# Patient Record
Sex: FEMALE | Race: WHITE | NOT HISPANIC OR LATINO | Employment: OTHER | URBAN - METROPOLITAN AREA
[De-identification: names, ages, dates, MRNs, and addresses within clinical notes are randomized per-mention and may not be internally consistent; named-entity substitution may affect disease eponyms.]

---

## 2017-02-24 ENCOUNTER — COMMUNICATION - HEALTHEAST (OUTPATIENT)
Dept: INTERNAL MEDICINE | Facility: CLINIC | Age: 67
End: 2017-02-24

## 2017-03-09 ENCOUNTER — COMMUNICATION - HEALTHEAST (OUTPATIENT)
Dept: INTERNAL MEDICINE | Facility: CLINIC | Age: 67
End: 2017-03-09

## 2017-03-21 ENCOUNTER — OFFICE VISIT - HEALTHEAST (OUTPATIENT)
Dept: INTERNAL MEDICINE | Facility: CLINIC | Age: 67
End: 2017-03-21

## 2017-03-21 ENCOUNTER — COMMUNICATION - HEALTHEAST (OUTPATIENT)
Dept: INTERNAL MEDICINE | Facility: CLINIC | Age: 67
End: 2017-03-21

## 2017-03-21 DIAGNOSIS — Z00.00 ROUTINE GENERAL MEDICAL EXAMINATION AT A HEALTH CARE FACILITY: ICD-10-CM

## 2017-03-21 DIAGNOSIS — R10.13 DYSPEPSIA: ICD-10-CM

## 2017-03-21 LAB
CHOLEST SERPL-MCNC: 180 MG/DL
FASTING STATUS PATIENT QL REPORTED: YES
HDLC SERPL-MCNC: 57 MG/DL
LDLC SERPL CALC-MCNC: 107 MG/DL
TRIGL SERPL-MCNC: 81 MG/DL

## 2017-03-21 ASSESSMENT — MIFFLIN-ST. JEOR: SCORE: 1450.29

## 2017-05-20 ENCOUNTER — COMMUNICATION - HEALTHEAST (OUTPATIENT)
Dept: INTERNAL MEDICINE | Facility: CLINIC | Age: 67
End: 2017-05-20

## 2017-05-31 ENCOUNTER — RECORDS - HEALTHEAST (OUTPATIENT)
Dept: ADMINISTRATIVE | Facility: OTHER | Age: 67
End: 2017-05-31

## 2017-06-05 ENCOUNTER — COMMUNICATION - HEALTHEAST (OUTPATIENT)
Dept: INTERNAL MEDICINE | Facility: CLINIC | Age: 67
End: 2017-06-05

## 2017-09-01 ENCOUNTER — HOSPITAL ENCOUNTER (OUTPATIENT)
Dept: MAMMOGRAPHY | Facility: CLINIC | Age: 67
Discharge: HOME | End: 2017-09-01
Attending: OBSTETRICS & GYNECOLOGY

## 2017-09-01 DIAGNOSIS — Z12.31 VISIT FOR SCREENING MAMMOGRAM: ICD-10-CM

## 2018-01-02 ENCOUNTER — OFFICE VISIT - HEALTHEAST (OUTPATIENT)
Dept: INTERNAL MEDICINE | Facility: CLINIC | Age: 68
End: 2018-01-02

## 2018-01-02 DIAGNOSIS — R07.89 CHEST PRESSURE: ICD-10-CM

## 2018-01-02 DIAGNOSIS — R10.13 DYSPEPSIA: ICD-10-CM

## 2018-01-02 LAB
ATRIAL RATE - MUSE: 66 BPM
DIASTOLIC BLOOD PRESSURE - MUSE: NORMAL MMHG
INTERPRETATION ECG - MUSE: NORMAL
P AXIS - MUSE: 57 DEGREES
PR INTERVAL - MUSE: 144 MS
QRS DURATION - MUSE: 78 MS
QT - MUSE: 380 MS
QTC - MUSE: 398 MS
R AXIS - MUSE: 7 DEGREES
SYSTOLIC BLOOD PRESSURE - MUSE: NORMAL MMHG
T AXIS - MUSE: 21 DEGREES
VENTRICULAR RATE- MUSE: 66 BPM

## 2018-01-02 ASSESSMENT — MIFFLIN-ST. JEOR: SCORE: 1468.43

## 2018-01-17 ENCOUNTER — COMMUNICATION - HEALTHEAST (OUTPATIENT)
Dept: INTERNAL MEDICINE | Facility: CLINIC | Age: 68
End: 2018-01-17

## 2018-03-28 ENCOUNTER — COMMUNICATION - HEALTHEAST (OUTPATIENT)
Dept: INTERNAL MEDICINE | Facility: CLINIC | Age: 68
End: 2018-03-28

## 2018-04-26 ENCOUNTER — COMMUNICATION - HEALTHEAST (OUTPATIENT)
Dept: INTERNAL MEDICINE | Facility: CLINIC | Age: 68
End: 2018-04-26

## 2018-05-02 ENCOUNTER — RECORDS - HEALTHEAST (OUTPATIENT)
Dept: ADMINISTRATIVE | Facility: OTHER | Age: 68
End: 2018-05-02

## 2018-05-29 ENCOUNTER — COMMUNICATION - HEALTHEAST (OUTPATIENT)
Dept: INTERNAL MEDICINE | Facility: CLINIC | Age: 68
End: 2018-05-29

## 2018-06-01 ENCOUNTER — RECORDS - HEALTHEAST (OUTPATIENT)
Dept: ADMINISTRATIVE | Facility: OTHER | Age: 68
End: 2018-06-01

## 2018-06-19 ENCOUNTER — COMMUNICATION - HEALTHEAST (OUTPATIENT)
Dept: INTERNAL MEDICINE | Facility: CLINIC | Age: 68
End: 2018-06-19

## 2018-07-30 ENCOUNTER — COMMUNICATION - HEALTHEAST (OUTPATIENT)
Dept: INTERNAL MEDICINE | Facility: CLINIC | Age: 68
End: 2018-07-30

## 2018-08-26 ENCOUNTER — COMMUNICATION - HEALTHEAST (OUTPATIENT)
Dept: INTERNAL MEDICINE | Facility: CLINIC | Age: 68
End: 2018-08-26

## 2018-08-28 ENCOUNTER — RECORDS - HEALTHEAST (OUTPATIENT)
Dept: ADMINISTRATIVE | Facility: OTHER | Age: 68
End: 2018-08-28

## 2018-09-04 ENCOUNTER — HOSPITAL ENCOUNTER (OUTPATIENT)
Dept: MAMMOGRAPHY | Facility: CLINIC | Age: 68
Discharge: HOME | End: 2018-09-04
Attending: OBSTETRICS & GYNECOLOGY

## 2018-09-04 DIAGNOSIS — Z12.31 VISIT FOR SCREENING MAMMOGRAM: ICD-10-CM

## 2018-09-14 ENCOUNTER — RECORDS - HEALTHEAST (OUTPATIENT)
Dept: ADMINISTRATIVE | Facility: OTHER | Age: 68
End: 2018-09-14

## 2018-09-30 ENCOUNTER — COMMUNICATION - HEALTHEAST (OUTPATIENT)
Dept: INTERNAL MEDICINE | Facility: CLINIC | Age: 68
End: 2018-09-30

## 2018-10-30 ENCOUNTER — COMMUNICATION - HEALTHEAST (OUTPATIENT)
Dept: INTERNAL MEDICINE | Facility: CLINIC | Age: 68
End: 2018-10-30

## 2018-12-12 ENCOUNTER — RECORDS - HEALTHEAST (OUTPATIENT)
Dept: ADMINISTRATIVE | Facility: OTHER | Age: 68
End: 2018-12-12

## 2018-12-14 ENCOUNTER — RECORDS - HEALTHEAST (OUTPATIENT)
Dept: ADMINISTRATIVE | Facility: OTHER | Age: 68
End: 2018-12-14

## 2019-02-25 ENCOUNTER — COMMUNICATION - HEALTHEAST (OUTPATIENT)
Dept: INTERNAL MEDICINE | Facility: CLINIC | Age: 69
End: 2019-02-25

## 2019-03-08 ENCOUNTER — COMMUNICATION - HEALTHEAST (OUTPATIENT)
Dept: INTERNAL MEDICINE | Facility: CLINIC | Age: 69
End: 2019-03-08

## 2019-03-08 DIAGNOSIS — R10.13 DYSPEPSIA: ICD-10-CM

## 2019-05-24 ENCOUNTER — COMMUNICATION - HEALTHEAST (OUTPATIENT)
Dept: INTERNAL MEDICINE | Facility: CLINIC | Age: 69
End: 2019-05-24

## 2019-05-24 DIAGNOSIS — R07.89 CHEST PRESSURE: ICD-10-CM

## 2019-05-29 ENCOUNTER — RECORDS - HEALTHEAST (OUTPATIENT)
Dept: ADMINISTRATIVE | Facility: OTHER | Age: 69
End: 2019-05-29

## 2019-06-14 ENCOUNTER — RECORDS - HEALTHEAST (OUTPATIENT)
Dept: ADMINISTRATIVE | Facility: OTHER | Age: 69
End: 2019-06-14

## 2019-06-21 ENCOUNTER — COMMUNICATION - HEALTHEAST (OUTPATIENT)
Dept: INTERNAL MEDICINE | Facility: CLINIC | Age: 69
End: 2019-06-21

## 2019-06-21 DIAGNOSIS — R07.89 CHEST PRESSURE: ICD-10-CM

## 2019-07-11 ENCOUNTER — OFFICE VISIT - HEALTHEAST (OUTPATIENT)
Dept: INTERNAL MEDICINE | Facility: CLINIC | Age: 69
End: 2019-07-11

## 2019-07-11 DIAGNOSIS — M79.661 RIGHT CALF PAIN: ICD-10-CM

## 2019-07-11 DIAGNOSIS — R10.13 DYSPEPSIA: ICD-10-CM

## 2019-07-11 DIAGNOSIS — R07.89 CHEST WALL PAIN: ICD-10-CM

## 2019-07-11 DIAGNOSIS — R07.89 CHEST PRESSURE: ICD-10-CM

## 2019-07-11 ASSESSMENT — MIFFLIN-ST. JEOR: SCORE: 1486.58

## 2019-07-12 ENCOUNTER — RECORDS - HEALTHEAST (OUTPATIENT)
Dept: ADMINISTRATIVE | Facility: OTHER | Age: 69
End: 2019-07-12

## 2019-08-01 ENCOUNTER — RECORDS - HEALTHEAST (OUTPATIENT)
Dept: ADMINISTRATIVE | Facility: OTHER | Age: 69
End: 2019-08-01

## 2019-08-21 ENCOUNTER — RECORDS - HEALTHEAST (OUTPATIENT)
Dept: ADMINISTRATIVE | Facility: OTHER | Age: 69
End: 2019-08-21

## 2019-08-29 ENCOUNTER — COMMUNICATION - HEALTHEAST (OUTPATIENT)
Dept: INTERNAL MEDICINE | Facility: CLINIC | Age: 69
End: 2019-08-29

## 2019-08-29 DIAGNOSIS — R07.89 CHEST PRESSURE: ICD-10-CM

## 2019-09-23 ENCOUNTER — OFFICE VISIT - HEALTHEAST (OUTPATIENT)
Dept: INTERNAL MEDICINE | Facility: CLINIC | Age: 69
End: 2019-09-23

## 2019-09-23 ENCOUNTER — HOSPITAL ENCOUNTER (OUTPATIENT)
Dept: CT IMAGING | Facility: CLINIC | Age: 69
Discharge: HOME | End: 2019-09-23
Attending: INTERNAL MEDICINE

## 2019-09-23 DIAGNOSIS — R07.89 CHEST PRESSURE: ICD-10-CM

## 2019-09-23 DIAGNOSIS — R63.5 ABNORMAL WEIGHT GAIN: ICD-10-CM

## 2019-09-23 DIAGNOSIS — R10.11 ABDOMINAL PAIN, RIGHT UPPER QUADRANT: ICD-10-CM

## 2019-09-23 DIAGNOSIS — R07.89 CHEST WALL PAIN: ICD-10-CM

## 2019-09-23 DIAGNOSIS — Z00.00 ROUTINE GENERAL MEDICAL EXAMINATION AT A HEALTH CARE FACILITY: ICD-10-CM

## 2019-09-23 LAB
ALBUMIN SERPL-MCNC: 4.1 G/DL (ref 3.5–5)
ALP SERPL-CCNC: 98 U/L (ref 45–120)
ALT SERPL W P-5'-P-CCNC: 18 U/L (ref 0–45)
ANION GAP SERPL CALCULATED.3IONS-SCNC: 8 MMOL/L (ref 5–18)
AST SERPL W P-5'-P-CCNC: 18 U/L (ref 0–40)
BILIRUB SERPL-MCNC: 0.7 MG/DL (ref 0–1)
BUN SERPL-MCNC: 23 MG/DL (ref 8–28)
CALCIUM SERPL-MCNC: 10.3 MG/DL (ref 8.5–10.5)
CHLORIDE BLD-SCNC: 104 MMOL/L (ref 98–107)
CHOLEST SERPL-MCNC: 217 MG/DL
CO2 SERPL-SCNC: 29 MMOL/L (ref 22–31)
CREAT BLD-MCNC: 0.8 MG/DL (ref 0.6–1.1)
CREAT SERPL-MCNC: 0.88 MG/DL (ref 0.6–1.1)
ERYTHROCYTE [DISTWIDTH] IN BLOOD BY AUTOMATED COUNT: 13 % (ref 11–14.5)
ERYTHROCYTE [SEDIMENTATION RATE] IN BLOOD BY WESTERGREN METHOD: 25 MM/HR (ref 0–20)
FASTING STATUS PATIENT QL REPORTED: ABNORMAL
GFR SERPL CREATININE-BSD FRML MDRD: >60 ML/MIN/1.73M2
GFR SERPL CREATININE-BSD FRML MDRD: >60 ML/MIN/1.73M2
GLUCOSE BLD-MCNC: 94 MG/DL (ref 70–125)
HCT VFR BLD AUTO: 40.1 % (ref 35–47)
HDLC SERPL-MCNC: 74 MG/DL
HGB BLD-MCNC: 13.1 G/DL (ref 12–16)
LDLC SERPL CALC-MCNC: 130 MG/DL
MCH RBC QN AUTO: 29.8 PG (ref 27–34)
MCHC RBC AUTO-ENTMCNC: 32.8 G/DL (ref 32–36)
MCV RBC AUTO: 91 FL (ref 80–100)
PLATELET # BLD AUTO: 357 THOU/UL (ref 140–440)
PMV BLD AUTO: 7.7 FL (ref 7–10)
POTASSIUM BLD-SCNC: 3.9 MMOL/L (ref 3.5–5)
PROT SERPL-MCNC: 7.6 G/DL (ref 6–8)
RBC # BLD AUTO: 4.41 MILL/UL (ref 3.8–5.4)
SODIUM SERPL-SCNC: 141 MMOL/L (ref 136–145)
TRIGL SERPL-MCNC: 67 MG/DL
WBC: 5.7 THOU/UL (ref 4–11)

## 2019-09-23 ASSESSMENT — MIFFLIN-ST. JEOR: SCORE: 1486.58

## 2019-09-24 ENCOUNTER — COMMUNICATION - HEALTHEAST (OUTPATIENT)
Dept: INTERNAL MEDICINE | Facility: CLINIC | Age: 69
End: 2019-09-24

## 2019-09-25 ENCOUNTER — RECORDS - HEALTHEAST (OUTPATIENT)
Dept: ADMINISTRATIVE | Facility: OTHER | Age: 69
End: 2019-09-25

## 2019-10-03 ENCOUNTER — HOSPITAL ENCOUNTER (OUTPATIENT)
Dept: MAMMOGRAPHY | Facility: CLINIC | Age: 69
Discharge: HOME | End: 2019-10-03
Attending: OBSTETRICS & GYNECOLOGY

## 2019-10-03 DIAGNOSIS — Z12.31 VISIT FOR SCREENING MAMMOGRAM: ICD-10-CM

## 2019-11-06 ENCOUNTER — RECORDS - HEALTHEAST (OUTPATIENT)
Dept: ADMINISTRATIVE | Facility: OTHER | Age: 69
End: 2019-11-06

## 2019-11-08 ENCOUNTER — COMMUNICATION - HEALTHEAST (OUTPATIENT)
Dept: INTERNAL MEDICINE | Facility: CLINIC | Age: 69
End: 2019-11-08

## 2019-11-08 DIAGNOSIS — R07.89 CHEST PRESSURE: ICD-10-CM

## 2019-11-22 ENCOUNTER — COMMUNICATION - HEALTHEAST (OUTPATIENT)
Dept: INTERNAL MEDICINE | Facility: CLINIC | Age: 69
End: 2019-11-22

## 2019-11-22 DIAGNOSIS — R07.89 CHEST PRESSURE: ICD-10-CM

## 2019-12-09 ASSESSMENT — MIFFLIN-ST. JEOR: SCORE: 1468.43

## 2019-12-23 ENCOUNTER — OFFICE VISIT - HEALTHEAST (OUTPATIENT)
Dept: INTERNAL MEDICINE | Facility: CLINIC | Age: 69
End: 2019-12-23

## 2019-12-23 DIAGNOSIS — I10 ESSENTIAL HYPERTENSION: ICD-10-CM

## 2019-12-23 DIAGNOSIS — Z01.818 PRE-OP EXAM: ICD-10-CM

## 2019-12-23 LAB
ANION GAP SERPL CALCULATED.3IONS-SCNC: 8 MMOL/L (ref 5–18)
BUN SERPL-MCNC: 24 MG/DL (ref 8–28)
CALCIUM SERPL-MCNC: 9.9 MG/DL (ref 8.5–10.5)
CHLORIDE BLD-SCNC: 105 MMOL/L (ref 98–107)
CO2 SERPL-SCNC: 30 MMOL/L (ref 22–31)
CREAT SERPL-MCNC: 0.82 MG/DL (ref 0.6–1.1)
ERYTHROCYTE [DISTWIDTH] IN BLOOD BY AUTOMATED COUNT: 14 % (ref 11–14.5)
GFR SERPL CREATININE-BSD FRML MDRD: >60 ML/MIN/1.73M2
GLUCOSE BLD-MCNC: 111 MG/DL (ref 70–125)
HCT VFR BLD AUTO: 37.9 % (ref 35–47)
HGB BLD-MCNC: 12.6 G/DL (ref 12–16)
MCH RBC QN AUTO: 29.9 PG (ref 27–34)
MCHC RBC AUTO-ENTMCNC: 33.3 G/DL (ref 32–36)
MCV RBC AUTO: 90 FL (ref 80–100)
PLATELET # BLD AUTO: 353 THOU/UL (ref 140–440)
PMV BLD AUTO: 6.9 FL (ref 7–10)
POTASSIUM BLD-SCNC: 4.1 MMOL/L (ref 3.5–5)
RBC # BLD AUTO: 4.22 MILL/UL (ref 3.8–5.4)
SODIUM SERPL-SCNC: 143 MMOL/L (ref 136–145)
WBC: 8.6 THOU/UL (ref 4–11)

## 2019-12-24 ENCOUNTER — COMMUNICATION - HEALTHEAST (OUTPATIENT)
Dept: INTERNAL MEDICINE | Facility: CLINIC | Age: 69
End: 2019-12-24

## 2019-12-26 LAB
ATRIAL RATE - MUSE: 74 BPM
DIASTOLIC BLOOD PRESSURE - MUSE: NORMAL
INTERPRETATION ECG - MUSE: NORMAL
P AXIS - MUSE: 62 DEGREES
PR INTERVAL - MUSE: 156 MS
QRS DURATION - MUSE: 82 MS
QT - MUSE: 388 MS
QTC - MUSE: 430 MS
R AXIS - MUSE: -9 DEGREES
SYSTOLIC BLOOD PRESSURE - MUSE: NORMAL
T AXIS - MUSE: 44 DEGREES
VENTRICULAR RATE- MUSE: 74 BPM

## 2019-12-27 ASSESSMENT — MIFFLIN-ST. JEOR: SCORE: 1484.3

## 2020-01-02 ENCOUNTER — SURGERY - HEALTHEAST (OUTPATIENT)
Dept: SURGERY | Facility: CLINIC | Age: 70
End: 2020-01-02

## 2020-01-02 ENCOUNTER — ANESTHESIA - HEALTHEAST (OUTPATIENT)
Dept: SURGERY | Facility: CLINIC | Age: 70
End: 2020-01-02

## 2020-01-02 ENCOUNTER — RECORDS - HEALTHEAST (OUTPATIENT)
Dept: ADMINISTRATIVE | Facility: OTHER | Age: 70
End: 2020-01-02

## 2020-01-02 ASSESSMENT — MIFFLIN-ST. JEOR: SCORE: 1482.94

## 2020-01-04 ASSESSMENT — MIFFLIN-ST. JEOR: SCORE: 1468.43

## 2020-01-17 ENCOUNTER — RECORDS - HEALTHEAST (OUTPATIENT)
Dept: ADMINISTRATIVE | Facility: OTHER | Age: 70
End: 2020-01-17

## 2020-02-19 ENCOUNTER — RECORDS - HEALTHEAST (OUTPATIENT)
Dept: ADMINISTRATIVE | Facility: OTHER | Age: 70
End: 2020-02-19

## 2020-02-23 ENCOUNTER — COMMUNICATION - HEALTHEAST (OUTPATIENT)
Dept: INTERNAL MEDICINE | Facility: CLINIC | Age: 70
End: 2020-02-23

## 2020-02-23 DIAGNOSIS — R07.89 CHEST PRESSURE: ICD-10-CM

## 2020-05-27 ENCOUNTER — COMMUNICATION - HEALTHEAST (OUTPATIENT)
Dept: INTERNAL MEDICINE | Facility: CLINIC | Age: 70
End: 2020-05-27

## 2020-05-27 DIAGNOSIS — R07.89 CHEST PRESSURE: ICD-10-CM

## 2020-07-15 ENCOUNTER — RECORDS - HEALTHEAST (OUTPATIENT)
Dept: ADMINISTRATIVE | Facility: OTHER | Age: 70
End: 2020-07-15

## 2020-07-20 ENCOUNTER — COMMUNICATION - HEALTHEAST (OUTPATIENT)
Dept: INTERNAL MEDICINE | Facility: CLINIC | Age: 70
End: 2020-07-20

## 2020-07-20 DIAGNOSIS — M15.0 PRIMARY OSTEOARTHRITIS INVOLVING MULTIPLE JOINTS: ICD-10-CM

## 2020-09-18 ENCOUNTER — RECORDS - HEALTHEAST (OUTPATIENT)
Dept: ADMINISTRATIVE | Facility: OTHER | Age: 70
End: 2020-09-18

## 2020-10-14 ENCOUNTER — RECORDS - HEALTHEAST (OUTPATIENT)
Dept: ADMINISTRATIVE | Facility: OTHER | Age: 70
End: 2020-10-14

## 2020-10-20 ENCOUNTER — COMMUNICATION - HEALTHEAST (OUTPATIENT)
Dept: INTERNAL MEDICINE | Facility: CLINIC | Age: 70
End: 2020-10-20

## 2020-11-17 ENCOUNTER — OFFICE VISIT - HEALTHEAST (OUTPATIENT)
Dept: INTERNAL MEDICINE | Facility: CLINIC | Age: 70
End: 2020-11-17

## 2020-11-17 ENCOUNTER — AMBULATORY - HEALTHEAST (OUTPATIENT)
Dept: INTERNAL MEDICINE | Facility: CLINIC | Age: 70
End: 2020-11-17

## 2020-11-17 ENCOUNTER — COMMUNICATION - HEALTHEAST (OUTPATIENT)
Dept: INTERNAL MEDICINE | Facility: CLINIC | Age: 70
End: 2020-11-17

## 2020-11-17 DIAGNOSIS — Z00.00 HEALTHCARE MAINTENANCE: ICD-10-CM

## 2020-11-17 DIAGNOSIS — I10 ESSENTIAL HYPERTENSION: ICD-10-CM

## 2020-11-17 DIAGNOSIS — M15.0 PRIMARY OSTEOARTHRITIS INVOLVING MULTIPLE JOINTS: ICD-10-CM

## 2020-11-17 DIAGNOSIS — K21.00 GASTROESOPHAGEAL REFLUX DISEASE WITH ESOPHAGITIS WITHOUT HEMORRHAGE: ICD-10-CM

## 2020-11-17 DIAGNOSIS — R07.89 CHEST PRESSURE: ICD-10-CM

## 2020-11-17 DIAGNOSIS — G89.29 CHRONIC PAIN OF BOTH SHOULDERS: ICD-10-CM

## 2020-11-17 DIAGNOSIS — N18.1 CHRONIC RENAL INSUFFICIENCY, STAGE 1: ICD-10-CM

## 2020-11-17 DIAGNOSIS — M25.512 CHRONIC PAIN OF BOTH SHOULDERS: ICD-10-CM

## 2020-11-17 DIAGNOSIS — M25.511 CHRONIC PAIN OF BOTH SHOULDERS: ICD-10-CM

## 2020-11-17 DIAGNOSIS — R60.0 LEG EDEMA: ICD-10-CM

## 2020-11-17 RX ORDER — PHENOL 1.4 %
1 AEROSOL, SPRAY (ML) MUCOUS MEMBRANE AT BEDTIME
Status: SHIPPED | COMMUNITY
Start: 2020-11-17

## 2020-11-17 RX ORDER — DIPHENHYDRAMINE HCL 25 MG
25 CAPSULE ORAL AT BEDTIME
Status: SHIPPED | COMMUNITY
Start: 2020-11-17 | End: 2022-12-14

## 2020-11-17 RX ORDER — LOSARTAN POTASSIUM 100 MG/1
100 TABLET ORAL DAILY
Qty: 90 TABLET | Refills: 3 | Status: SHIPPED | OUTPATIENT
Start: 2020-11-17 | End: 2021-12-01

## 2020-11-17 ASSESSMENT — PATIENT HEALTH QUESTIONNAIRE - PHQ9: SUM OF ALL RESPONSES TO PHQ QUESTIONS 1-9: 0

## 2020-12-08 ENCOUNTER — HOSPITAL ENCOUNTER (OUTPATIENT)
Dept: MAMMOGRAPHY | Facility: CLINIC | Age: 70
Discharge: HOME | End: 2020-12-08

## 2020-12-08 DIAGNOSIS — Z12.31 VISIT FOR SCREENING MAMMOGRAM: ICD-10-CM

## 2020-12-14 ENCOUNTER — COMMUNICATION - HEALTHEAST (OUTPATIENT)
Dept: INTERNAL MEDICINE | Facility: CLINIC | Age: 70
End: 2020-12-14

## 2020-12-14 DIAGNOSIS — M15.0 PRIMARY OSTEOARTHRITIS INVOLVING MULTIPLE JOINTS: ICD-10-CM

## 2020-12-17 ENCOUNTER — COMMUNICATION - HEALTHEAST (OUTPATIENT)
Dept: INTERNAL MEDICINE | Facility: CLINIC | Age: 70
End: 2020-12-17

## 2020-12-17 DIAGNOSIS — R10.13 DYSPEPSIA: ICD-10-CM

## 2020-12-18 ENCOUNTER — OFFICE VISIT - HEALTHEAST (OUTPATIENT)
Dept: INTERNAL MEDICINE | Facility: CLINIC | Age: 70
End: 2020-12-18

## 2020-12-18 DIAGNOSIS — E78.2 MIXED HYPERLIPIDEMIA: ICD-10-CM

## 2020-12-18 DIAGNOSIS — N18.1 CHRONIC RENAL INSUFFICIENCY, STAGE 1: ICD-10-CM

## 2020-12-18 DIAGNOSIS — Z00.00 ENCOUNTER FOR ANNUAL WELLNESS EXAM IN MEDICARE PATIENT: ICD-10-CM

## 2020-12-18 DIAGNOSIS — I10 ESSENTIAL HYPERTENSION: ICD-10-CM

## 2020-12-18 DIAGNOSIS — R73.03 PREDIABETES: ICD-10-CM

## 2020-12-18 DIAGNOSIS — M81.0 AGE-RELATED OSTEOPOROSIS WITHOUT CURRENT PATHOLOGICAL FRACTURE: ICD-10-CM

## 2020-12-18 DIAGNOSIS — E55.9 VITAMIN D INSUFFICIENCY: ICD-10-CM

## 2020-12-18 LAB
ALBUMIN SERPL-MCNC: 4.2 G/DL (ref 3.5–5)
ALP SERPL-CCNC: 99 U/L (ref 45–120)
ALT SERPL W P-5'-P-CCNC: 31 U/L (ref 0–45)
ANION GAP SERPL CALCULATED.3IONS-SCNC: 8 MMOL/L (ref 5–18)
AST SERPL W P-5'-P-CCNC: 28 U/L (ref 0–40)
BASOPHILS # BLD AUTO: 0 THOU/UL (ref 0–0.2)
BASOPHILS NFR BLD AUTO: 1 % (ref 0–2)
BILIRUB SERPL-MCNC: 0.6 MG/DL (ref 0–1)
BUN SERPL-MCNC: 27 MG/DL (ref 8–28)
CALCIUM SERPL-MCNC: 9.6 MG/DL (ref 8.5–10.5)
CHLORIDE BLD-SCNC: 102 MMOL/L (ref 98–107)
CHOLEST SERPL-MCNC: 219 MG/DL
CO2 SERPL-SCNC: 31 MMOL/L (ref 22–31)
CREAT SERPL-MCNC: 0.79 MG/DL (ref 0.6–1.1)
EOSINOPHIL # BLD AUTO: 0.2 THOU/UL (ref 0–0.4)
EOSINOPHIL NFR BLD AUTO: 3 % (ref 0–6)
ERYTHROCYTE [DISTWIDTH] IN BLOOD BY AUTOMATED COUNT: 11.1 % (ref 11–14.5)
FASTING STATUS PATIENT QL REPORTED: YES
GFR SERPL CREATININE-BSD FRML MDRD: >60 ML/MIN/1.73M2
GLUCOSE BLD-MCNC: 87 MG/DL (ref 70–125)
HBA1C MFR BLD: 5.4 %
HCT VFR BLD AUTO: 41.1 % (ref 35–47)
HDLC SERPL-MCNC: 78 MG/DL
HGB BLD-MCNC: 14.1 G/DL (ref 12–16)
LDLC SERPL CALC-MCNC: 126 MG/DL
LYMPHOCYTES # BLD AUTO: 1.5 THOU/UL (ref 0.8–4.4)
LYMPHOCYTES NFR BLD AUTO: 28 % (ref 20–40)
MCH RBC QN AUTO: 33.8 PG (ref 27–34)
MCHC RBC AUTO-ENTMCNC: 34.4 G/DL (ref 32–36)
MCV RBC AUTO: 98 FL (ref 80–100)
MONOCYTES # BLD AUTO: 0.4 THOU/UL (ref 0–0.9)
MONOCYTES NFR BLD AUTO: 8 % (ref 2–10)
NEUTROPHILS # BLD AUTO: 3.4 THOU/UL (ref 2–7.7)
NEUTROPHILS NFR BLD AUTO: 61 % (ref 50–70)
PLATELET # BLD AUTO: 262 THOU/UL (ref 140–440)
PMV BLD AUTO: 6.9 FL (ref 7–10)
POTASSIUM BLD-SCNC: 4 MMOL/L (ref 3.5–5)
PROT SERPL-MCNC: 7.2 G/DL (ref 6–8)
RBC # BLD AUTO: 4.17 MILL/UL (ref 3.8–5.4)
SODIUM SERPL-SCNC: 141 MMOL/L (ref 136–145)
TRIGL SERPL-MCNC: 74 MG/DL
TSH SERPL DL<=0.005 MIU/L-ACNC: 2.54 UIU/ML (ref 0.3–5)
WBC: 5.6 THOU/UL (ref 4–11)

## 2020-12-18 RX ORDER — FUROSEMIDE 20 MG
20 TABLET ORAL DAILY
Qty: 90 TABLET | Refills: 11 | Status: SHIPPED | OUTPATIENT
Start: 2020-12-18 | End: 2022-02-22

## 2020-12-18 ASSESSMENT — MIFFLIN-ST. JEOR: SCORE: 1463.32

## 2020-12-21 LAB — 25(OH)D3 SERPL-MCNC: 64 NG/ML (ref 30–80)

## 2021-01-13 ENCOUNTER — RECORDS - HEALTHEAST (OUTPATIENT)
Dept: ADMINISTRATIVE | Facility: OTHER | Age: 71
End: 2021-01-13

## 2021-01-13 ENCOUNTER — RECORDS - HEALTHEAST (OUTPATIENT)
Dept: BONE DENSITY | Facility: CLINIC | Age: 71
End: 2021-01-13

## 2021-01-13 ENCOUNTER — HOSPITAL ENCOUNTER (OUTPATIENT)
Dept: CT IMAGING | Facility: CLINIC | Age: 71
Discharge: HOME | End: 2021-01-13
Attending: INTERNAL MEDICINE

## 2021-01-13 DIAGNOSIS — E78.2 MIXED HYPERLIPIDEMIA: ICD-10-CM

## 2021-01-13 DIAGNOSIS — M81.0 AGE-RELATED OSTEOPOROSIS WITHOUT CURRENT PATHOLOGICAL FRACTURE: ICD-10-CM

## 2021-01-13 LAB
CV CALCIUM SCORE AGATSTON LM: 0
CV CALCIUM SCORING AGATSON LAD: 6
CV CALCIUM SCORING AGATSTON CX: 0
CV CALCIUM SCORING AGATSTON RCA: 234
CV CALCIUM SCORING AGATSTON TOTAL: 240

## 2021-01-16 ENCOUNTER — COMMUNICATION - HEALTHEAST (OUTPATIENT)
Dept: INTERNAL MEDICINE | Facility: CLINIC | Age: 71
End: 2021-01-16

## 2021-01-18 ENCOUNTER — AMBULATORY - HEALTHEAST (OUTPATIENT)
Dept: INTERNAL MEDICINE | Facility: CLINIC | Age: 71
End: 2021-01-18

## 2021-01-18 DIAGNOSIS — I25.10 ASCVD (ARTERIOSCLEROTIC CARDIOVASCULAR DISEASE): ICD-10-CM

## 2021-01-18 RX ORDER — ATORVASTATIN CALCIUM 20 MG/1
20 TABLET, FILM COATED ORAL DAILY
Qty: 90 TABLET | Refills: 11 | Status: SHIPPED | OUTPATIENT
Start: 2021-01-18 | End: 2022-04-15

## 2021-01-25 ENCOUNTER — HOSPITAL ENCOUNTER (OUTPATIENT)
Dept: NUCLEAR MEDICINE | Facility: CLINIC | Age: 71
Discharge: HOME | End: 2021-01-25
Attending: INTERNAL MEDICINE

## 2021-01-25 ENCOUNTER — HOSPITAL ENCOUNTER (OUTPATIENT)
Dept: CARDIOLOGY | Facility: CLINIC | Age: 71
Discharge: HOME | End: 2021-01-25
Attending: INTERNAL MEDICINE

## 2021-01-25 DIAGNOSIS — I25.10 ASCVD (ARTERIOSCLEROTIC CARDIOVASCULAR DISEASE): ICD-10-CM

## 2021-01-25 LAB
CV STRESS CURRENT BP HE: NORMAL
CV STRESS CURRENT HR HE: 100
CV STRESS CURRENT HR HE: 117
CV STRESS CURRENT HR HE: 117
CV STRESS CURRENT HR HE: 126
CV STRESS CURRENT HR HE: 129
CV STRESS CURRENT HR HE: 129
CV STRESS CURRENT HR HE: 134
CV STRESS CURRENT HR HE: 140
CV STRESS CURRENT HR HE: 143
CV STRESS CURRENT HR HE: 148
CV STRESS CURRENT HR HE: 149
CV STRESS CURRENT HR HE: 70
CV STRESS CURRENT HR HE: 74
CV STRESS CURRENT HR HE: 86
CV STRESS CURRENT HR HE: 87
CV STRESS CURRENT HR HE: 88
CV STRESS CURRENT HR HE: 89
CV STRESS CURRENT HR HE: 90
CV STRESS CURRENT HR HE: 90
CV STRESS CURRENT HR HE: 91
CV STRESS CURRENT HR HE: 93
CV STRESS CURRENT HR HE: 94
CV STRESS DEVIATION TIME HE: NORMAL
CV STRESS ECHO PERCENT HR HE: NORMAL
CV STRESS EXERCISE STAGE HE: NORMAL
CV STRESS EXERCISE STAGE REACHED HE: NORMAL
CV STRESS FINAL RESTING BP HE: NORMAL
CV STRESS FINAL RESTING HR HE: 90
CV STRESS MAX HR HE: 149
CV STRESS MAX TREADMILL GRADE HE: 12
CV STRESS MAX TREADMILL SPEED HE: 2.5
CV STRESS PEAK DIA BP HE: NORMAL
CV STRESS PEAK SYS BP HE: NORMAL
CV STRESS PHASE HE: NORMAL
CV STRESS PROTOCOL HE: NORMAL
CV STRESS RESTING PT POSITION HE: NORMAL
CV STRESS RESTING PT POSITION HE: NORMAL
CV STRESS ST DEVIATION AMOUNT HE: NORMAL
CV STRESS ST DEVIATION ELEVATION HE: NORMAL
CV STRESS ST EVELATION AMOUNT HE: NORMAL
CV STRESS TEST TYPE HE: NORMAL
CV STRESS TOTAL STAGE TIME MIN 1 HE: NORMAL
RATE PRESSURE PRODUCT: NORMAL
STRESS ECHO BASELINE DIASTOLIC HE: 70
STRESS ECHO BASELINE HR: 71
STRESS ECHO BASELINE SYSTOLIC BP: 124
STRESS ECHO CALCULATED PERCENT HR: 100 %
STRESS ECHO LAST STRESS DIASTOLIC BP: 70
STRESS ECHO LAST STRESS HR: 149
STRESS ECHO LAST STRESS SYSTOLIC BP: 188
STRESS ECHO POST ESTIMATED WORKLOAD: 7
STRESS ECHO POST EXERCISE DUR MIN: 5
STRESS ECHO POST EXERCISE DUR SEC: 0
STRESS ECHO TARGET HR: 149

## 2021-02-15 ENCOUNTER — COMMUNICATION - HEALTHEAST (OUTPATIENT)
Dept: INTERNAL MEDICINE | Facility: CLINIC | Age: 71
End: 2021-02-15

## 2021-02-15 DIAGNOSIS — M15.0 PRIMARY OSTEOARTHRITIS INVOLVING MULTIPLE JOINTS: ICD-10-CM

## 2021-02-24 ENCOUNTER — OFFICE VISIT - HEALTHEAST (OUTPATIENT)
Dept: INTERNAL MEDICINE | Facility: CLINIC | Age: 71
End: 2021-02-24

## 2021-02-24 DIAGNOSIS — E66.01 MORBID OBESITY (H): ICD-10-CM

## 2021-02-24 DIAGNOSIS — R22.1 NECK MASS: ICD-10-CM

## 2021-02-26 ENCOUNTER — HOSPITAL ENCOUNTER (OUTPATIENT)
Dept: ULTRASOUND IMAGING | Facility: CLINIC | Age: 71
Discharge: HOME | End: 2021-02-26
Attending: INTERNAL MEDICINE

## 2021-02-26 DIAGNOSIS — R22.1 NECK MASS: ICD-10-CM

## 2021-03-01 ENCOUNTER — COMMUNICATION - HEALTHEAST (OUTPATIENT)
Dept: INTERNAL MEDICINE | Facility: CLINIC | Age: 71
End: 2021-03-01

## 2021-03-02 ENCOUNTER — COMMUNICATION - HEALTHEAST (OUTPATIENT)
Dept: INTERNAL MEDICINE | Facility: CLINIC | Age: 71
End: 2021-03-02

## 2021-03-09 ENCOUNTER — AMBULATORY - HEALTHEAST (OUTPATIENT)
Dept: NURSING | Facility: CLINIC | Age: 71
End: 2021-03-09

## 2021-03-22 ENCOUNTER — COMMUNICATION - HEALTHEAST (OUTPATIENT)
Dept: INTERNAL MEDICINE | Facility: CLINIC | Age: 71
End: 2021-03-22

## 2021-03-22 DIAGNOSIS — M15.0 PRIMARY OSTEOARTHRITIS INVOLVING MULTIPLE JOINTS: ICD-10-CM

## 2021-03-30 ENCOUNTER — AMBULATORY - HEALTHEAST (OUTPATIENT)
Dept: NURSING | Facility: CLINIC | Age: 71
End: 2021-03-30

## 2021-05-17 ENCOUNTER — COMMUNICATION - HEALTHEAST (OUTPATIENT)
Dept: INTERNAL MEDICINE | Facility: CLINIC | Age: 71
End: 2021-05-17

## 2021-05-17 DIAGNOSIS — M15.0 PRIMARY OSTEOARTHRITIS INVOLVING MULTIPLE JOINTS: ICD-10-CM

## 2021-05-17 RX ORDER — SULINDAC 200 MG/1
200 TABLET ORAL 2 TIMES DAILY
Qty: 60 TABLET | Refills: 0 | Status: SHIPPED | OUTPATIENT
Start: 2021-05-17 | End: 2022-01-21

## 2021-05-26 ENCOUNTER — RECORDS - HEALTHEAST (OUTPATIENT)
Dept: ADMINISTRATIVE | Facility: CLINIC | Age: 71
End: 2021-05-26

## 2021-05-27 ENCOUNTER — RECORDS - HEALTHEAST (OUTPATIENT)
Dept: ADMINISTRATIVE | Facility: CLINIC | Age: 71
End: 2021-05-27

## 2021-05-27 ASSESSMENT — PATIENT HEALTH QUESTIONNAIRE - PHQ9: SUM OF ALL RESPONSES TO PHQ QUESTIONS 1-9: 0

## 2021-05-28 ENCOUNTER — RECORDS - HEALTHEAST (OUTPATIENT)
Dept: ADMINISTRATIVE | Facility: CLINIC | Age: 71
End: 2021-05-28

## 2021-05-29 ENCOUNTER — RECORDS - HEALTHEAST (OUTPATIENT)
Dept: ADMINISTRATIVE | Facility: CLINIC | Age: 71
End: 2021-05-29

## 2021-05-29 NOTE — TELEPHONE ENCOUNTER
RN cannot approve Refill Request    RN can NOT refill this medication PCP messaged that patient is overdue for Labs and Office Visit before Cozaar and Lasix can be refilled and [facility] med is not covered by policy/route to provider. Last office visit: 1/2/2018 Joesph Lange MD Last Physical: 3/21/2017 Last MTM visit: Visit date not found Last visit same specialty: 1/2/2018 Joesph Lange MD.  Next visit within 3 mo: Visit date not found  Next physical within 3 mo: Visit date not found      Gwen Richter, [facility] Triage/Med Refill 5/26/2019    Requested Prescriptions   Pending Prescriptions Disp Refills     losartan (COZAAR) 100 MG tablet [Pharmacy Med Name: Losartan Potassium Tablet 100 MG] 90 tablet 0     Sig: TAKE 1 TABLET ONCE DAILY.       Angiotensin Receptor Blocker Protocol Failed - 5/24/2019 11:45 AM        Failed - PCP or prescribing provider visit in past 12 months       Last office visit with prescriber/PCP: 1/2/2018 Joesph Lange MD OR same dept: Visit date not found OR same specialty: 1/2/2018 Joesph Lange MD  Last physical: 3/21/2017 Last MTM visit: Visit date not found   Next visit within 3 mo: Visit date not found  Next physical within 3 mo: Visit date not found  Prescriber OR PCP: Joesph Lange MD  Last diagnosis associated with med order: There are no diagnoses linked to this encounter.  If protocol passes may refill for 12 months if within 3 months of last provider visit (or a total of 15 months).             Failed - Serum potassium within the past 12 months     No results found for: LN-POTASSIUM          Failed - Blood pressure filed in past 12 months     BP Readings from Last 1 Encounters:   01/02/18 122/80             Failed - Serum creatinine within the past 12 months     Creatinine   Date Value Ref Range Status   03/21/2017 0.75 0.60 - 1.10 mg/dL Final             furosemide (LASIX) 40 MG tablet [Pharmacy Med Name: Furosemide Tablet 40 MG] 90 tablet 0     Sig: take  one tablet by mouth once daily       Diuretics/Combination Diuretics Refill Protocol  Failed - 5/24/2019 11:45 AM        Failed - Visit with PCP or prescribing provider visit in past 12 months     Last office visit with prescriber/PCP: 1/2/2018 Joesph Lange MD OR same dept: Visit date not found OR same specialty: 1/2/2018 Joesph Lange MD  Last physical: 3/21/2017 Last MTM visit: Visit date not found   Next visit within 3 mo: Visit date not found  Next physical within 3 mo: Visit date not found  Prescriber OR PCP: Joesph Lange MD  Last diagnosis associated with med order: There are no diagnoses linked to this encounter.  If protocol passes may refill for 12 months if within 3 months of last provider visit (or a total of 15 months).             Failed - Serum Potassium in past 12 months      No results found for: LN-POTASSIUM          Failed - Serum Sodium in past 12 months      No results found for: LN-SODIUM          Failed - Blood pressure on file in past 12 months     BP Readings from Last 1 Encounters:   01/02/18 122/80             Failed - Serum Creatinine in past 12 months      Creatinine   Date Value Ref Range Status   03/21/2017 0.75 0.60 - 1.10 mg/dL Final             sulindac (CLINORIL) 200 MG tablet [Pharmacy Med Name: Sulindac Tablet 200 MG] 60 tablet 0     Sig: TAKE 1 TABLET BY MOUTH TWICE DAILY       There is no refill protocol information for this order

## 2021-05-29 NOTE — TELEPHONE ENCOUNTER
RN cannot approve Refill Request    RN can NOT refill this medication med is not covered by policy/route to provider.    Devante Woody, [facility] Triage/Med Refill 6/21/2019    Requested Prescriptions   Pending Prescriptions Disp Refills     sulindac (CLINORIL) 200 MG tablet [Pharmacy Med Name: Sulindac Tablet 200 MG] 60 tablet 0     Sig: TAKE 1 TABLET BY MOUTH TWICE DAILY       There is no refill protocol information for this order

## 2021-05-30 ENCOUNTER — RECORDS - HEALTHEAST (OUTPATIENT)
Dept: ADMINISTRATIVE | Facility: CLINIC | Age: 71
End: 2021-05-30

## 2021-05-30 VITALS — HEIGHT: 65 IN | WEIGHT: 206 LBS | BODY MASS INDEX: 34.32 KG/M2

## 2021-05-30 NOTE — PROGRESS NOTES
"Office Visit - Follow up    Munira Rodriguez   69 y.o. female    Date of Visit: 7/11/2019    Chief Complaint   Patient presents with     Chest Pain     right rib area pain, ongoing for a few months, seems to be getting worse. pt states when she brings right leg up to abdomen, she gets a shooting pain      Knee Pain     left knee, aching. 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    Recent diagnosis of osteoarthritis of with a \"pinched nerve\" in the L5-S1 region.  Followed by Dr. Fredi Luevano.  In addition has had significant exacerbation of left knee pain requiring injection.  Synvisc is planned.  She has been favoring this knee and notes some discomfort in the left upper calf as well.  She has not had problems with a calf injury and notes pain with ambulation of the calf.    Has a through 2 to 3-month history of right anterior lower chest wall discomfort.  Does not recall an injury    Discomfort is worse with very deep breath however is only modest in severity.  She is using sulindac regularly    ROS: A comprehensive review of systems was performed and was otherwise negative except as mentioned above.     Exam  Left calf exam normal minimal tenderness no swelling.  Right chest wall moderately tender to deep palpation abdomen negative   /78 (Patient Site: Right Arm, Patient Position: Sitting, Cuff Size: Adult Regular)   Pulse 64   Resp 18   Ht 5' 5\" (1.651 m)   Wt 214 lb (97.1 kg)   SpO2 97%   BMI 35.61 kg/m      Assessment and Plan  Right-sided chest wall pain likely intercostal muscles or cartilage she is on nonsteroidal agent I suspect it will subside spontaneously no further diagnostic studies appear to be indicated at this time.    I suspect her left calf discomfort is muscular and related to favoring " this knee a bit.  I have asked her to work with therapist for good stretching exercises left calf.  Follow-up if not improved    She is overdue for physical exam which will be scheduled    Munira was seen today for chest pain and knee pain.    Diagnoses and all orders for this visit:    Chest wall pain    Chest pressure  -     sulindac (CLINORIL) 200 MG tablet; Take 1 tablet (200 mg total) by mouth 2 (two) times a day.  -     losartan (COZAAR) 100 MG tablet; Take 1 tablet (100 mg total) by mouth daily.  -     furosemide (LASIX) 40 MG tablet; Take 1 tablet (40 mg total) by mouth daily.    Dyspepsia  -     omeprazole (PRILOSEC) 20 MG capsule; TAKE ONE CAPSULE ORALLY DAILY          Time: total time spent with the patient was 20 minutes of which >50% was spent in counseling and coordination of care        No Known Allergies    Medications :  Prior to Admission medications    Medication Sig Start Date End Date Taking? Authorizing Provider   aspirin 81 MG EC tablet Take 81 mg by mouth daily.   Yes PROVIDER, HISTORICAL   calcium carbonate-vitamin D3 (CALCIUM 600 + D,3,) 600 mg calcium- 200 unit cap Take by mouth 2 (two) times a day.   Yes PROVIDER, HISTORICAL   DOCOSAHEXANOIC ACID/EPA (FISH OIL ORAL) Take by mouth 2 (two) times a day.   Yes PROVIDER, HISTORICAL   furosemide (LASIX) 40 MG tablet Take 1 tablet (40 mg total) by mouth daily. 7/11/19  Yes Joesph Lange MD   GLUC/CHND/OM3/DHA/EPA/FISH/STR (GLUCOSAMINE CHONDROITIN PLUS ORAL) Take by mouth daily.   Yes PROVIDER, HISTORICAL   losartan (COZAAR) 100 MG tablet Take 1 tablet (100 mg total) by mouth daily. 7/11/19  Yes Joesph Lange MD   multivitamin therapeutic (THERAGRAN) tablet Take 1 tablet by mouth daily.   Yes PROVIDER, HISTORICAL   omeprazole (PRILOSEC) 20 MG capsule TAKE ONE CAPSULE ORALLY DAILY 7/11/19  Yes Joesph Lange MD   sulindac (CLINORIL) 200 MG tablet Take 1 tablet (200 mg total) by mouth 2 (two) times a day. 7/11/19  Yes Joesph Lange MD    furosemide (LASIX) 40 MG tablet take one tablet by mouth once daily 5/27/19 7/11/19 Yes Joesph Lange MD   losartan (COZAAR) 100 MG tablet TAKE 1 TABLET ONCE DAILY. 5/27/19 7/11/19 Yes Joesph Lange MD   omeprazole (PRILOSEC) 20 MG capsule TAKE ONE CAPSULE ORALLY DAILY 3/10/19 7/11/19 Yes Joesph Lange MD   sulindac (CLINORIL) 200 MG tablet TAKE 1 TABLET BY MOUTH TWICE DAILY 6/21/19 7/11/19 Yes Joesph Lange MD        Past Medical History: No past medical history on file.    Past Surgical History: No past surgical history on file.    Social History:   Social History     Socioeconomic History     Marital status:      Spouse name: Not on file     Number of children: Not on file     Years of education: Not on file     Highest education level: Not on file   Occupational History     Not on file   Social Needs     Financial resource strain: Not on file     Food insecurity:     Worry: Not on file     Inability: Not on file     Transportation needs:     Medical: Not on file     Non-medical: Not on file   Tobacco Use     Smoking status: Never Smoker     Smokeless tobacco: Never Used   Substance and Sexual Activity     Alcohol use: Not on file     Drug use: Not on file     Sexual activity: Not on file   Lifestyle     Physical activity:     Days per week: Not on file     Minutes per session: Not on file     Stress: Not on file   Relationships     Social connections:     Talks on phone: Not on file     Gets together: Not on file     Attends Jew service: Not on file     Active member of club or organization: Not on file     Attends meetings of clubs or organizations: Not on file     Relationship status: Not on file     Intimate partner violence:     Fear of current or ex partner: Not on file     Emotionally abused: Not on file     Physically abused: Not on file     Forced sexual activity: Not on file   Other Topics Concern     Not on file   Social History Narrative     Not on file       Family History:    Family History   Problem Relation Age of Onset     Cancer Father         bladder     Skin cancer Father      Prostate cancer Paternal Grandfather          Joesph Lange MD

## 2021-05-31 VITALS — BODY MASS INDEX: 34.99 KG/M2 | HEIGHT: 65 IN | WEIGHT: 210 LBS

## 2021-05-31 NOTE — TELEPHONE ENCOUNTER
RN cannot approve Refill Request    RN can NOT refill this medication Protocol failed and NO refill given.      Tracy Noguera, [facility] Triage/Med Refill 8/30/2019    Requested Prescriptions   Pending Prescriptions Disp Refills     furosemide (LASIX) 40 MG tablet [Pharmacy Med Name: Furosemide Tablet 40 MG] 90 tablet 0     Sig: take one tablet by mouth once daily       Diuretics/Combination Diuretics Refill Protocol  Failed - 8/29/2019 11:10 AM        Failed - Serum Potassium in past 12 months      No results found for: LN-POTASSIUM          Failed - Serum Sodium in past 12 months      No results found for: LN-SODIUM          Failed - Serum Creatinine in past 12 months      Creatinine   Date Value Ref Range Status   03/21/2017 0.75 0.60 - 1.10 mg/dL Final             Passed - Visit with PCP or prescribing provider visit in past 12 months     Last office visit with prescriber/PCP: 7/11/2019 Joesph Lange MD OR same dept: 7/11/2019 Joesph Lange MD OR same specialty: 7/11/2019 Joseph Lange MD  Last physical: 3/21/2017 Last MTM visit: Visit date not found   Next visit within 3 mo: Visit date not found  Next physical within 3 mo: Visit date not found  Prescriber OR PCP: Joesph Lange MD  Last diagnosis associated with med order: 1. Chest pressure  - furosemide (LASIX) 40 MG tablet [Pharmacy Med Name: Furosemide Tablet 40 MG]; take one tablet by mouth once daily  Dispense: 90 tablet; Refill: 0    If protocol passes may refill for 12 months if within 3 months of last provider visit (or a total of 15 months).             Passed - Blood pressure on file in past 12 months     BP Readings from Last 1 Encounters:   07/11/19 122/78

## 2021-06-01 ENCOUNTER — RECORDS - HEALTHEAST (OUTPATIENT)
Dept: ADMINISTRATIVE | Facility: CLINIC | Age: 71
End: 2021-06-01

## 2021-06-01 ENCOUNTER — OFFICE VISIT - HEALTHEAST (OUTPATIENT)
Dept: INTERNAL MEDICINE | Facility: CLINIC | Age: 71
End: 2021-06-01

## 2021-06-01 DIAGNOSIS — R22.1 MASS OF NECK: ICD-10-CM

## 2021-06-01 ASSESSMENT — MIFFLIN-ST. JEOR: SCORE: 1494.61

## 2021-06-01 NOTE — PATIENT INSTRUCTIONS - HE
Advance Directive  Patient s advance directive was discussed and I am comfortable with the patient s wishes.  Patient Education   Personalized Prevention Plan  You are due for the preventive services outlined below.  Your care team is available to assist you in scheduling these services.  If you have already completed any of these items, please share that information with your care team to update in your medical record.  Health Maintenance   Topic Date Due     HEPATITIS C SCREENING  1950     ZOSTER VACCINES (1 of 2) 06/19/1999     TD 18+ HE  01/01/2008     DXA SCAN  06/19/2014     PNEUMOCOCCAL IMMUNIZATION 65+ HIGH/HIGHEST RISK (2 of 2 - PPSV23) 03/11/2016     MEDICARE ANNUAL WELLNESS VISIT  03/21/2018     INFLUENZA VACCINE RULE BASED (1) 08/01/2019     MAMMOGRAM  09/04/2020     FALL RISK ASSESSMENT  09/23/2020     ADVANCE CARE PLANNING  03/21/2022     COLONOSCOPY  05/31/2027

## 2021-06-01 NOTE — PROGRESS NOTES
"Office Visit - Physical    Munira Rodriguez   69 y.o. 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    Past medical history noted.  Pain as discussed during visit on July 11 appears to be slowly resolving however has persisted.  In addition has had intermittent abdominal pain and a weight gain of several pounds with mild edema.  Denies any symptoms of heart failure.  There has been no evidence of underlying renal disease.    Edema and weight gain is of significant concern to her about potential for systemic illness.    Health risk assessment cognitive assessment mood assessment no new concerns      Past surgical history reviewed.  Has had no other major health concerns.  Does take sulindac for arthritic symptoms this may be contributing to symptoms.  Stable on omeprazole no other change.  History of hypertension on losartan blood pressures have been well controlled    Personal and social history reviewed no change    ROS: A comprehensive review of systems was performed and was otherwise negative except as mentioned above.    Exam   Alert and oriented BMI as noted head and neck unremarkable lungs clear good breath sounds heart normal no murmur no JVD skin and lymphatics negative abdomen soft nontender no organomegaly neuro negative extremities 1-2+ edema  /78   Pulse 67   Ht 5' 5\" (1.651 m)   Wt 214 lb (97.1 kg)   SpO2 96%   Breastfeeding? No   BMI 35.61 kg/m      Assessment and Plan    Wellness exam stable doubt major findings.  Complaints as noted I would like to begin furosemide low dose for mild edema and fluid retention.  Checking routine labs to include sed rate etc.  Will also order CT abdomen pelvis in view of her " concerns abdominal discomfort weight gain etc.    Munira was seen today for annual wellness visit.    Diagnoses and all orders for this visit:    Routine general medical examination at a health care facility  -     HM2(CBC w/o Differential); Future  -     Comprehensive Metabolic Panel; Future  -     Lipid Cascade; Future  -     Erythrocyte Sedimentation Rate  -     HM2(CBC w/o Differential)  -     Comprehensive Metabolic Panel  -     Lipid Cascade    Chest pressure  -     HM2(CBC w/o Differential); Future  -     Comprehensive Metabolic Panel; Future  -     Lipid Cascade; Future  -     Erythrocyte Sedimentation Rate  -     CT Abdomen Pelvis With Oral With IV Contrast; Future  -     HM2(CBC w/o Differential)  -     Comprehensive Metabolic Panel  -     Lipid Cascade    Chest wall pain  -     HM2(CBC w/o Differential); Future  -     Comprehensive Metabolic Panel; Future  -     Lipid Cascade; Future  -     Erythrocyte Sedimentation Rate  -     CT Abdomen Pelvis With Oral With IV Contrast; Future  -     HM2(CBC w/o Differential)  -     Comprehensive Metabolic Panel  -     Lipid Cascade    Abnormal weight gain  -     HM2(CBC w/o Differential); Future  -     Comprehensive Metabolic Panel; Future  -     Lipid Cascade; Future  -     Erythrocyte Sedimentation Rate  -     CT Abdomen Pelvis With Oral With IV Contrast; Future  -     HM2(CBC w/o Differential)  -     Comprehensive Metabolic Panel  -     Lipid Cascade    Abdominal pain, right upper quadrant  -     HM2(CBC w/o Differential); Future  -     Comprehensive Metabolic Panel; Future  -     Lipid Cascade; Future  -     Erythrocyte Sedimentation Rate  -     CT Abdomen Pelvis With Oral With IV Contrast; Future  -     HM2(CBC w/o Differential)  -     Comprehensive Metabolic Panel  -     Lipid Cascade              Medications:   Prior to Admission medications    Medication Sig Start Date End Date Taking? Authorizing Provider   aspirin 81 MG EC tablet Take 81 mg by mouth daily.    Yes PROVIDER, HISTORICAL   calcium carbonate-vitamin D3 (CALCIUM 600 + D,3,) 600 mg calcium- 200 unit cap Take by mouth 2 (two) times a day.   Yes PROVIDER, HISTORICAL   DOCOSAHEXANOIC ACID/EPA (FISH OIL ORAL) Take by mouth 2 (two) times a day.   Yes PROVIDER, HISTORICAL   furosemide (LASIX) 40 MG tablet take one tablet by mouth once daily 8/30/19  Yes Millie Akins MD   GLUC/CHND/OM3/DHA/EPA/FISH/STR (GLUCOSAMINE CHONDROITIN PLUS ORAL) Take by mouth daily.   Yes PROVIDER, HISTORICAL   losartan (COZAAR) 100 MG tablet Take 1 tablet (100 mg total) by mouth daily. 7/11/19  Yes Joesph Lange MD   multivitamin therapeutic (THERAGRAN) tablet Take 1 tablet by mouth daily.   Yes PROVIDER, HISTORICAL   omeprazole (PRILOSEC) 20 MG capsule TAKE ONE CAPSULE ORALLY DAILY 7/11/19  Yes Joesph Lange MD   sulindac (CLINORIL) 200 MG tablet Take 1 tablet (200 mg total) by mouth 2 (two) times a day. 7/11/19  Yes Joesph Lange MD       Allergies:No Known Allergies    Immunizations:   Immunization History   Administered Date(s) Administered     DT (pediatric) 01/01/1998     Influenza high dose,seasonal,PF, 65+ yrs 01/15/2016, 01/02/2018     Influenza, seasonal,quad inj 6-35 mos 11/07/2014     Pneumo Conj 13-V (2010&after) 11/07/2014     Pneumo Polysac 23-V 01/15/2016     Td,adult,historic,unspecified 01/01/1998       Past Medical History: No past medical history on file.    Past Surgical History: No past surgical history on file.    Family History:   Family History   Problem Relation Age of Onset     Cancer Father         bladder     Skin cancer Father      Prostate cancer Paternal Grandfather        Social History:   Social History     Socioeconomic History     Marital status:      Spouse name: Not on file     Number of children: Not on file     Years of education: Not on file     Highest education level: Not on file   Occupational History     Not on file   Social Needs     Financial resource strain: Not on file      Food insecurity:     Worry: Not on file     Inability: Not on file     Transportation needs:     Medical: Not on file     Non-medical: Not on file   Tobacco Use     Smoking status: Never Smoker     Smokeless tobacco: Never Used   Substance and Sexual Activity     Alcohol use: Not on file     Drug use: Not on file     Sexual activity: Not on file   Lifestyle     Physical activity:     Days per week: Not on file     Minutes per session: Not on file     Stress: Not on file   Relationships     Social connections:     Talks on phone: Not on file     Gets together: Not on file     Attends Latter-day service: Not on file     Active member of club or organization: Not on file     Attends meetings of clubs or organizations: Not on file     Relationship status: Not on file     Intimate partner violence:     Fear of current or ex partner: Not on file     Emotionally abused: Not on file     Physically abused: Not on file     Forced sexual activity: Not on file   Other Topics Concern     Not on file   Social History Narrative     Not on file         Joesph Lange MD      Assessment and Plan:           The patient's current medical problems were reviewed.      The following health maintenance schedule was reviewed with the patient and provided in printed form in the after visit summary:   Health Maintenance   Topic Date Due     HEPATITIS C SCREENING  1950     ZOSTER VACCINES (1 of 2) 06/19/1999     TD 18+ HE  01/01/2008     DXA SCAN  06/19/2014     PNEUMOCOCCAL IMMUNIZATION 65+ HIGH/HIGHEST RISK (2 of 2 - PPSV23) 03/11/2016     MEDICARE ANNUAL WELLNESS VISIT  03/21/2018     INFLUENZA VACCINE RULE BASED (1) 08/01/2019     MAMMOGRAM  09/04/2020     FALL RISK ASSESSMENT  09/23/2020     ADVANCE CARE PLANNING  03/21/2022     COLONOSCOPY  05/31/2027        Subjective:   Chief Complaint: Munira Rodriguez is an 69 y.o. female here for an Annual Wellness visit.   HPI:      Review of Systems:    Please see above.  The rest of  the review of systems are negative for all systems.    Patient Care Team:  Joesph Lange MD as PCP - General  Joesph Lange MD as Assigned PCP     Patient Active Problem List   Diagnosis     Group A Streptococcus: B Hemolytic Pharyngitis     Acute Sore Throat     Dyspepsia     Menopause     Neck Pain     Chest Pain     Essential Hypertension     Chronic renal insufficiency, stage 1     No past medical history on file.   No past surgical history on file.   Family History   Problem Relation Age of Onset     Cancer Father         bladder     Skin cancer Father      Prostate cancer Paternal Grandfather       Social History     Socioeconomic History     Marital status:      Spouse name: Not on file     Number of children: Not on file     Years of education: Not on file     Highest education level: Not on file   Occupational History     Not on file   Social Needs     Financial resource strain: Not on file     Food insecurity:     Worry: Not on file     Inability: Not on file     Transportation needs:     Medical: Not on file     Non-medical: Not on file   Tobacco Use     Smoking status: Never Smoker     Smokeless tobacco: Never Used   Substance and Sexual Activity     Alcohol use: Not on file     Drug use: Not on file     Sexual activity: Not on file   Lifestyle     Physical activity:     Days per week: Not on file     Minutes per session: Not on file     Stress: Not on file   Relationships     Social connections:     Talks on phone: Not on file     Gets together: Not on file     Attends Confucianism service: Not on file     Active member of club or organization: Not on file     Attends meetings of clubs or organizations: Not on file     Relationship status: Not on file     Intimate partner violence:     Fear of current or ex partner: Not on file     Emotionally abused: Not on file     Physically abused: Not on file     Forced sexual activity: Not on file   Other Topics Concern     Not on file   Social History  "Narrative     Not on file      Current Outpatient Medications   Medication Sig Dispense Refill     aspirin 81 MG EC tablet Take 81 mg by mouth daily.       calcium carbonate-vitamin D3 (CALCIUM 600 + D,3,) 600 mg calcium- 200 unit cap Take by mouth 2 (two) times a day.       DOCOSAHEXANOIC ACID/EPA (FISH OIL ORAL) Take by mouth 2 (two) times a day.       furosemide (LASIX) 40 MG tablet take one tablet by mouth once daily 90 tablet 3     GLUC/CHND/OM3/DHA/EPA/FISH/STR (GLUCOSAMINE CHONDROITIN PLUS ORAL) Take by mouth daily.       losartan (COZAAR) 100 MG tablet Take 1 tablet (100 mg total) by mouth daily. 90 tablet 3     multivitamin therapeutic (THERAGRAN) tablet Take 1 tablet by mouth daily.       omeprazole (PRILOSEC) 20 MG capsule TAKE ONE CAPSULE ORALLY DAILY 90 capsule 3     sulindac (CLINORIL) 200 MG tablet Take 1 tablet (200 mg total) by mouth 2 (two) times a day. 60 tablet 3     No current facility-administered medications for this visit.       Objective:   Vital Signs:   Visit Vitals  /78   Pulse 67   Ht 5' 5\" (1.651 m)   Wt 214 lb (97.1 kg)   SpO2 96%   Breastfeeding? No   BMI 35.61 kg/m         VisionScreening:  No exam data present     PHYSICAL EXAM      Assessment Results 9/23/2019   Activities of Daily Living No help needed   Instrumental Activities of Daily Living No help needed   Get Up and Go Score Less than 12 seconds   Mini Cog Total Score 5   Some recent data might be hidden     A Mini-Cog score of 0-2 suggests the possibility of dementia, score of 3-5 suggests no dementia    Identified Health Risks:     Patient's advanced directive was discussed and he patient's wishes.        "

## 2021-06-03 VITALS — BODY MASS INDEX: 35.65 KG/M2 | WEIGHT: 214 LBS | HEIGHT: 65 IN

## 2021-06-03 VITALS
OXYGEN SATURATION: 96 % | HEIGHT: 65 IN | BODY MASS INDEX: 35.65 KG/M2 | SYSTOLIC BLOOD PRESSURE: 138 MMHG | DIASTOLIC BLOOD PRESSURE: 78 MMHG | WEIGHT: 214 LBS | HEART RATE: 67 BPM

## 2021-06-03 NOTE — TELEPHONE ENCOUNTER
Refill Approved    Rx renewed per Medication Renewal Policy. Medication was last renewed on 8/30/19.    Meri Lares, [facility] Connection Triage/Med Refill 11/22/2019     Requested Prescriptions   Pending Prescriptions Disp Refills     furosemide (LASIX) 40 MG tablet [Pharmacy Med Name: FUROSEMIDE 40MG] 90 tablet 3     Sig: TAKE ONE TABLET BY MOUTH ONCE DAILY       Diuretics/Combination Diuretics Refill Protocol  Passed - 11/22/2019 10:09 AM        Passed - Visit with PCP or prescribing provider visit in past 12 months     Last office visit with prescriber/PCP: 7/11/2019 Joesph Lange MD OR same dept: 7/11/2019 Joesph Lange MD OR same specialty: 7/11/2019 Joesph Lange MD  Last physical: 9/23/2019 Last MTM visit: Visit date not found   Next visit within 3 mo: Visit date not found  Next physical within 3 mo: Visit date not found  Prescriber OR PCP: Joesph Lange MD  Last diagnosis associated with med order: 1. Chest pressure  - furosemide (LASIX) 40 MG tablet [Pharmacy Med Name: FUROSEMIDE 40MG]; take one tablet by mouth once daily  Dispense: 90 tablet; Refill: 3    If protocol passes may refill for 12 months if within 3 months of last provider visit (or a total of 15 months).             Passed - Serum Potassium in past 12 months      Lab Results   Component Value Date    Potassium 3.9 09/23/2019             Passed - Serum Sodium in past 12 months      Lab Results   Component Value Date    Sodium 141 09/23/2019             Passed - Blood pressure on file in past 12 months     BP Readings from Last 1 Encounters:   09/23/19 138/78             Passed - Serum Creatinine in past 12 months      Creatinine   Date Value Ref Range Status   09/23/2019 0.88 0.60 - 1.10 mg/dL Final

## 2021-06-03 NOTE — TELEPHONE ENCOUNTER
RN cannot approve Refill Request    RN can NOT refill this medication med is not covered by policy/route to provider. Last office visit: 7/11/2019 Joesph Lange MD Last Physical: 9/23/2019 Last MTM visit: Visit date not found Last visit same specialty: 7/11/2019 Joesph Lange MD.  Next visit within 3 mo: Visit date not found  Next physical within 3 mo: Visit date not found      Sandra Short, [facility] [place] Connection Triage/Med Refill 11/8/2019    Requested Prescriptions   Pending Prescriptions Disp Refills     sulindac (CLINORIL) 200 MG tablet [Pharmacy Med Name: SULINDAC 200MG] 60 tablet 3     Sig: TAKE 1 TABLET (200 MG TOTAL) BY MOUTH 2 (TWO) TIMES A DAY.       There is no refill protocol information for this order

## 2021-06-04 VITALS — WEIGHT: 210 LBS | BODY MASS INDEX: 34.99 KG/M2 | HEIGHT: 65 IN

## 2021-06-04 VITALS
WEIGHT: 214 LBS | SYSTOLIC BLOOD PRESSURE: 124 MMHG | HEART RATE: 81 BPM | OXYGEN SATURATION: 96 % | RESPIRATION RATE: 14 BRPM | BODY MASS INDEX: 35.61 KG/M2 | DIASTOLIC BLOOD PRESSURE: 74 MMHG | TEMPERATURE: 98.5 F

## 2021-06-04 NOTE — ANESTHESIA PROCEDURE NOTES
Spinal Block    Patient location during procedure: OR  Start time: 1/2/2020 10:10 AM  End time: 1/2/2020 10:12 AM  Reason for block: primary anesthetic    Staffing:  Performing  Anesthesiologist: Luz Bhardwaj MD    Preanesthetic Checklist  Completed: patient identified, risks, benefits, and alternatives discussed, timeout performed, consent obtained, airway assessed, oxygen available, suction available, emergency drugs available and hand hygiene performed  Spinal Block  Patient position: sitting  Prep: Betadine  Patient monitoring: heart rate, cardiac monitor, continuous pulse ox and blood pressure  Approach: right paramedian  Location: L4-5  Injection technique: single-shot  Needle type: pencil-tip   Needle gauge: 24 G    Assessment  Sensory level: T8

## 2021-06-04 NOTE — ANESTHESIA PROCEDURE NOTES
Peripheral Block    Patient location during procedure: pre-op  Start time: 1/2/2020 7:58 AM  End time: 1/2/2020 7:59 AM  post-op analgesia per surgeon order as noted in medical record  Staffing:  Performing  Anesthesiologist: Luz Bhardwaj MD  Preanesthetic Checklist  Completed: patient identified, site marked, risks, benefits, and alternatives discussed, timeout performed, consent obtained, airway assessed, oxygen available, suction available, emergency drugs available and hand hygiene performed  Peripheral Block  Block type: other, tibial  Prep: ChloraPrep  Patient position: right lateral decubitus  Patient monitoring: blood pressure, heart rate, continuous pulse oximetry and cardiac monitor  Laterality: left  Injection technique: ultrasound guided    Ultrasound used to visualize needle placement in proximity to nerve being blocked: yes   US used to visualize anesthetic spread    Permanent ultrasound image captured for medical record  Sterile gel and probe cover used for ultrasound.  Needle  Needle type: Stimuplex   Needle gauge: 20G  Needle length: 4 in  no peripheral nerve catheter placed  Assessment  Injection assessment: no difficulty with injection, negative aspiration for heme, no paresthesia on injection and incremental injection

## 2021-06-04 NOTE — PROGRESS NOTES
Office Visit - Follow up    Munira Rodriguez   69 y.o. 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 Luevano at [facility].    General health has been very good her recent wellness visit from September 23 is reviewed no major changes.  She has had no recurrence of abdominal pain etc.  History of hypertension and superficial phlebitis..    Current regimen reviewed as noted    No known allergies    We will hold both aspirin and sulindac in anticipation of surgery    No new complaints    ROS: A comprehensive review of systems was performed and was otherwise negative except as mentioned above.     Exam  Normal mental status alert and noted skin and lymphatics negative EENT negative lungs heart negative abdomen benign breasts negative extremities normal pulses trace edema exam otherwise unremarkable   /74   Pulse 81   Temp 98.5  F (36.9  C) (Oral)   Resp 14   Wt 214 lb (97.1 kg)   SpO2 96%   BMI 35.61 kg/m      Assessment and Plan  Stable preoperative exam preoperative EKG is unremarkable preoperative labs pending at the time of this dictation.  No contraindications to planned procedure    Hypertension by history stable    Chronic dyspepsia on omeprazole stable    On hormone replacement therapy followed by her gynecologist    Otherwise normal preoperative exam    Munira was seen today for pre-op exam.    Diagnoses and all orders for this visit:    Pre-op exam  -     Electrocardiogram Perform - Clinic  -     HM2(CBC w/o Differential); Future  -     Basic Metabolic Panel  -     HM2(CBC w/o Differential)    Essential hypertension  -     HM2(CBC w/o Differential); Future  -     Basic Metabolic Panel  -     HM2(CBC w/o Differential)          Time: total  time spent with the patient was 25 minutes of which >50% was spent in counseling and coordination of care        No Known Allergies    Medications :  Prior to Admission medications    Medication Sig Start Date End Date Taking? Authorizing Provider   aspirin 81 MG EC tablet Take 81 mg by mouth daily.   Yes PROVIDER, HISTORICAL   calcium carbonate-vitamin D3 (CALCIUM 600 + D,3,) 600 mg calcium- 200 unit cap Take by mouth 2 (two) times a day.   Yes PROVIDER, HISTORICAL   DOCOSAHEXANOIC ACID/EPA (FISH OIL ORAL) Take by mouth 2 (two) times a day.   Yes PROVIDER, HISTORICAL   furosemide (LASIX) 40 MG tablet TAKE ONE TABLET BY MOUTH ONCE DAILY 11/22/19  Yes Joesph Lange MD   GLUC/CHND/OM3/DHA/EPA/FISH/STR (GLUCOSAMINE CHONDROITIN PLUS ORAL) Take by mouth daily.   Yes PROVIDER, HISTORICAL   losartan (COZAAR) 100 MG tablet Take 1 tablet (100 mg total) by mouth daily. 7/11/19  Yes Joesph Lange MD   multivitamin therapeutic (THERAGRAN) tablet Take 1 tablet by mouth daily.   Yes PROVIDER, HISTORICAL   omeprazole (PRILOSEC) 20 MG capsule TAKE ONE CAPSULE ORALLY DAILY 7/11/19  Yes Joesph Lange MD   senna (SENOKOT) 8.6 mg tablet Take 1 tablet by mouth daily.   Yes PROVIDER, HISTORICAL   sulindac (CLINORIL) 200 MG tablet TAKE 1 TABLET (200 MG TOTAL) BY MOUTH 2 (TWO) TIMES A DAY. 11/8/19 12/23/19 Yes Joesph Lange MD   estradiol (ESTRACE) 1 MG tablet Take 1 mg by mouth daily.    PROVIDER, HISTORICAL   potassium citrate (UROCIT-K) 10 mEq (1,080 mg) SR tablet Take by mouth 3 (three) times a day with meals.    PROVIDER, HISTORICAL   progesterone (PROMETRIUM) 100 MG capsule Take 100 mg by mouth daily.    PROVIDER, HISTORICAL   gabapentin (NEURONTIN) 300 MG capsule Take 300 mg by mouth 3 (three) times a day.  12/23/19  PROVIDER, HISTORICAL        Past Medical History:   Past Medical History:   Diagnosis Date     Acid reflux      Arthritis      History of pulmonary embolus (PE) 1972     Hypertension      Psoriasis       Rectocele      Thrombophlebitis 1980       Past Surgical History:   Past Surgical History:   Procedure Laterality Date     APPENDECTOMY       JOINT REPLACEMENT       KNEE ARTHROSCOPY         Social History:   Social History     Socioeconomic History     Marital status:      Spouse name: Not on file     Number of children: Not on file     Years of education: Not on file     Highest education level: Not on file   Occupational History     Not on file   Social Needs     Financial resource strain: Not on file     Food insecurity:     Worry: Not on file     Inability: Not on file     Transportation needs:     Medical: Not on file     Non-medical: Not on file   Tobacco Use     Smoking status: Never Smoker     Smokeless tobacco: Never Used   Substance and Sexual Activity     Alcohol use: Not Currently     Drug use: Not on file     Sexual activity: Not on file   Lifestyle     Physical activity:     Days per week: Not on file     Minutes per session: Not on file     Stress: Not on file   Relationships     Social connections:     Talks on phone: Not on file     Gets together: Not on file     Attends [employer] service: Not on file     Active member of club or organization: Not on file     Attends meetings of clubs or organizations: Not on file     Relationship status: Not on file     Intimate partner violence:     Fear of current or ex partner: Not on file     Emotionally abused: Not on file     Physically abused: Not on file     Forced sexual activity: Not on file   Other Topics Concern     Not on file   Social History Narrative     Not on file       Family History:   Family History   Problem Relation Age of Onset     Cancer Father         bladder     Skin cancer Father      Prostate cancer Paternal Grandfather          Joesph Lange MD     16

## 2021-06-04 NOTE — ANESTHESIA CARE TRANSFER NOTE
Last vitals:   Vitals:    01/02/20 1220   BP: (P) 98/52   Pulse: (P) 76   Resp: (P) 16   Temp: (P) 36.5  C (97.7  F)   SpO2: (P) 91%     Patient's level of consciousness is awake  Spontaneous respirations: yes  Maintains airway independently: yes  Dentition unchanged: yes  Oropharynx: oropharynx clear of all foreign objects    QCDR Measures:  ASA# 20 - Surgical Safety Checklist: WHO surgical safety checklist completed prior to induction    PQRS# 430 - Adult PONV Prevention: 4558F - Pt received => 2 anti-emetic agents (different classes) preop & intraop  ASA# 8 - Peds PONV Prevention: NA - Not pediatric patient, not GA or 2 or more risk factors NOT present  PQRS# 424 - Richa-op Temp Management: 4559F - At least one body temp DOCUMENTED => 35.5C or 95.9F within required timeframe  PQRS# 426 - PACU Transfer Protocol: - Transfer of care checklist used  ASA# 14 - Acute Post-op Pain: ASA14B - Patient did NOT experience pain >= 7 out of 10

## 2021-06-04 NOTE — ANESTHESIA POSTPROCEDURE EVALUATION
Patient: Munira Rodriguez  LEFT TOTAL KNEE ARTHROPLASTY, COMPUTER-ASSIST  Anesthesia type: spinal    Patient location: PACU  Last vitals:   Vitals Value Taken Time   BP 98/52 1/2/2020 12:21 PM   Temp 36.5  C (97.7  F) 1/2/2020 12:20 PM   Pulse 71 1/2/2020 12:28 PM   Resp 9 1/2/2020 12:28 PM   SpO2 97 % 1/2/2020 12:28 PM   Vitals shown include unvalidated device data.  Post vital signs: stable  Level of consciousness: awake and responds to simple questions  Post-anesthesia pain: pain controlled  Post-anesthesia nausea and vomiting: no  Pulmonary: unassisted, return to baseline  Cardiovascular: stable and blood pressure at baseline  Hydration: adequate  Anesthetic events: no    QCDR Measures:  ASA# 11 - Richa-op Cardiac Arrest: ASA11B - Patient did NOT experience unanticipated cardiac arrest  ASA# 12 - Richa-op Mortality Rate: ASA12B - Patient did NOT die  ASA# 13 - PACU Re-Intubation Rate: ASA13B - Patient did NOT require a new airway mgmt  ASA# 10 - Composite Anes Safety: ASA10A - No serious adverse event    Additional Notes:

## 2021-06-04 NOTE — ANESTHESIA PROCEDURE NOTES
Peripheral Block    Start time: 1/2/2020 8:40 AM  End time: 1/2/2020 8:41 AM  post-op analgesia per surgeon order as noted in medical record  Staffing:  Performing  Anesthesiologist: Luz Bhardwaj MD  Preanesthetic Checklist  Completed: patient identified, site marked, risks, benefits, and alternatives discussed, timeout performed, consent obtained, airway assessed, oxygen available, suction available, emergency drugs available and hand hygiene performed  Peripheral Block  Block type: saphenous, adductor canal block  Prep: ChloraPrep  Patient position: supine  Patient monitoring: blood pressure, heart rate, continuous pulse oximetry and cardiac monitor  Laterality: left  Injection technique: ultrasound guided    Ultrasound used to visualize needle placement in proximity to nerve being blocked: yes   US used to visualize anesthetic spread    Permanent ultrasound image captured for medical record  Sterile gel and probe cover used for ultrasound.  Needle  Needle type: Stimuplex   Needle gauge: 20G  Needle length: 4 in  no peripheral nerve catheter placed  Assessment  Injection assessment: no difficulty with injection, negative aspiration for heme, no paresthesia on injection and incremental injection

## 2021-06-04 NOTE — ANESTHESIA PREPROCEDURE EVALUATION
Anesthesia Evaluation      Patient summary reviewed   No history of anesthetic complications     Airway   Mallampati: II  Neck ROM: full   Pulmonary - normal exam                          Cardiovascular - normal exam  (+) hypertension, ,     ECG reviewed        Neuro/Psych      Endo/Other    (+) obesity,      GI/Hepatic/Renal    (+) GERD,   chronic renal disease,           Dental - normal exam                        Anesthesia Plan  Planned anesthetic: spinal and peripheral nerve block    ASA 2     Anesthetic plan and risks discussed with: patient  Anesthesia plan special considerations: antiemetics,   Post-op plan: routine recovery

## 2021-06-05 VITALS
SYSTOLIC BLOOD PRESSURE: 122 MMHG | OXYGEN SATURATION: 96 % | HEART RATE: 68 BPM | WEIGHT: 215.1 LBS | DIASTOLIC BLOOD PRESSURE: 74 MMHG | BODY MASS INDEX: 35.52 KG/M2 | TEMPERATURE: 98.5 F

## 2021-06-05 VITALS
HEIGHT: 65 IN | TEMPERATURE: 97.4 F | OXYGEN SATURATION: 99 % | BODY MASS INDEX: 34.66 KG/M2 | HEART RATE: 70 BPM | WEIGHT: 208 LBS | SYSTOLIC BLOOD PRESSURE: 128 MMHG | DIASTOLIC BLOOD PRESSURE: 70 MMHG

## 2021-06-06 NOTE — TELEPHONE ENCOUNTER
Former patient of Nazario & has not established care with another provider.  Please assign refill request to covering provider per Clinic standard process.  Refill Approved    Rx renewed per Medication Renewal Policy. Medication was last renewed on 11/22/19.    Tracy Noguera, [facility] Connection Triage/Med Refill 2/25/2020     Requested Prescriptions   Pending Prescriptions Disp Refills     furosemide (LASIX) 40 MG tablet [Pharmacy Med Name: FUROSEMIDE 40MG] 90 tablet 0     Sig: TAKE ONE TABLET BY MOUTH ONCE DAILY       Diuretics/Combination Diuretics Refill Protocol  Passed - 2/23/2020  1:29 PM        Passed - Visit with PCP or prescribing provider visit in past 12 months     Last office visit with prescriber/PCP: 7/11/2019 Joesph Lange MD OR same dept: 7/11/2019 Joesph Lange MD OR same specialty: 7/11/2019 Joesph Lange MD  Last physical: 12/23/2019 Last MTM visit: Visit date not found   Next visit within 3 mo: Visit date not found  Next physical within 3 mo: Visit date not found  Prescriber OR PCP: Joesph Lange MD  Last diagnosis associated with med order: 1. Chest pressure  - furosemide (LASIX) 40 MG tablet [Pharmacy Med Name: FUROSEMIDE 40MG]; TAKE ONE TABLET BY MOUTH ONCE DAILY  Dispense: 90 tablet; Refill: 0    If protocol passes may refill for 12 months if within 3 months of last provider visit (or a total of 15 months).             Passed - Serum Potassium in past 12 months      Lab Results   Component Value Date    Potassium 4.3 01/03/2020             Passed - Serum Sodium in past 12 months      Lab Results   Component Value Date    Sodium 140 01/03/2020             Passed - Blood pressure on file in past 12 months     BP Readings from Last 1 Encounters:   01/04/20 132/66             Passed - Serum Creatinine in past 12 months      Creatinine   Date Value Ref Range Status   01/03/2020 0.69 0.60 - 1.10 mg/dL Final

## 2021-06-09 NOTE — TELEPHONE ENCOUNTER
RN cannot approve Refill Request    RN can NOT refill this medication med is not covered by policy/route to provider     . Last office visit: 7/11/2019 Joesph Lange MD Last Physical: 12/23/2019 Last MTM visit: Visit date not found Last visit same specialty: 7/11/2019 Joesph Lange MD.  Next visit within 3 mo: Visit date not found  Next physical within 3 mo: Visit date not found      Tracy Noguera, [facility] Connection Triage/Med Refill 7/20/2020    Requested Prescriptions   Pending Prescriptions Disp Refills     sulindac (CLINORIL) 200 MG tablet [Pharmacy Med Name: SULINDAC 200MG] 60 tablet 2     Sig: TAKE 1 TABLET (200 MG TOTAL) BY MOUTH 2 (TWO) TIMES A DAY.       There is no refill protocol information for this order

## 2021-06-09 NOTE — PROGRESS NOTES
"Office Visit - Physical    Munira Rodriguez   66 y.o. 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  Last year her creatinine was up slightly we had reduced this follow-up creatinine was normal.    Is on hormone replacement and we had a discussion about this.  She is on very low dose and does use progesterone    We will continue at current dose for now and discussed pros and cons regarding continuation.    Previous history otherwise negative    Personal social and family history unchanged    Current medications reviewed she does have mild chronic dyspepsia discussed rationale regarding continuing long-term therapy        ROS: A comprehensive review of systems was performed and was otherwise negative except as mentioned above.    Exam   Normal mental status EENT negative lungs clear heart negative abdomen benign breasts normal skin and lymphatics negative extremities and normal neuro exam negative  Visit Vitals     /80     Pulse 78     Ht 5' 5\" (1.651 m)     Wt 206 lb (93.4 kg)     BMI 34.28 kg/m2       Assessment and Plan    Stable physical exam labs pending    Munira was seen today for annual exam.    Diagnoses and all orders for this visit:    Routine general medical examination at a health care facility  -     HM2(CBC w/o Differential); Future  -     Comprehensive Metabolic Panel; Future  -     Lipid Cascade; Future  -     HM2(CBC w/o Differential)  -     Comprehensive Metabolic Panel  -     Lipid Cascade    Dyspepsia  -     omeprazole (PRILOSEC) 20 MG capsule; TAKE ONE CAPSULE ORALLY DAILY  -     HM2(CBC w/o Differential); Future  -     Comprehensive Metabolic Panel; Future  -     Lipid Cascade; Future  -     HM2(CBC w/o " Differential)  -     Comprehensive Metabolic Panel  -     Lipid Cascade    Other orders  -     furosemide (LASIX) 40 MG tablet; take one tablet by mouth once daily  -     losartan (COZAAR) 100 MG tablet; TAKE 1 TABLET ONCE DAILY.  -     sulindac (CLINORIL) 200 MG tablet; TAKE 1 TABLET BY MOUTH TWICE DAILY              Medications:   Prior to Admission medications    Medication Sig Start Date End Date Taking? Authorizing Provider   aspirin 81 MG EC tablet Take 81 mg by mouth daily.   Yes PROVIDER, HISTORICAL   calcium carbonate-vitamin D3 (CALCIUM 600 + D,3,) 600 mg calcium- 200 unit cap Take by mouth 2 (two) times a day.   Yes PROVIDER, HISTORICAL   DOCOSAHEXANOIC ACID/EPA (FISH OIL ORAL) Take by mouth 2 (two) times a day.   Yes PROVIDER, HISTORICAL   furosemide (LASIX) 40 MG tablet take one tablet by mouth once daily 3/21/17  Yes Joesph Lange MD   GLUC/CHND/OM3/DHA/EPA/FISH/STR (GLUCOSAMINE CHONDROITIN PLUS ORAL) Take by mouth daily.   Yes PROVIDER, HISTORICAL   losartan (COZAAR) 100 MG tablet TAKE 1 TABLET ONCE DAILY. 3/21/17  Yes Joesph Lange MD   multivitamin therapeutic (THERAGRAN) tablet Take 1 tablet by mouth daily.   Yes PROVIDER, HISTORICAL   omeprazole (PRILOSEC) 20 MG capsule TAKE ONE CAPSULE ORALLY DAILY 3/21/17  Yes Joesph Lange MD   progesterone (PROMETRIUM) 200 MG capsule Take 200 mg by mouth daily.   Yes PROVIDER, HISTORICAL   sulindac (CLINORIL) 200 MG tablet TAKE 1 TABLET BY MOUTH TWICE DAILY 3/21/17  Yes Joesph Lange MD   furosemide (LASIX) 40 MG tablet take one tablet by mouth once daily 12/11/15 3/21/17 Yes Joesph Lange MD   losartan (COZAAR) 100 MG tablet TAKE 1 TABLET ONCE DAILY. 12/11/15 3/21/17 Yes Joesph Lange MD   omeprazole (PRILOSEC) 20 MG capsule TAKE ONE CAPSULE ORALLY DAILY 4/9/16 3/21/17 Yes Joesph Lange MD   sulindac (CLINORIL) 200 MG tablet TAKE 1 TABLET BY MOUTH TWICE DAILY 7/8/16 3/21/17 Yes Terry Johnson MD   estradiol (CLIMARA) 0.025 mg/24 hr Place 1  patch on the skin once a week.    PROVIDER, HISTORICAL   furosemide (LASIX) 40 MG tablet take one tablet by mouth once daily 2/24/17 3/21/17  Terry Johnson MD   losartan (COZAAR) 100 MG tablet TAKE 1 TABLET ONCE DAILY. 3/9/17 3/21/17  Devin Quinn MD       Allergies:No Known Allergies    Immunizations:   Immunization History   Administered Date(s) Administered     DT (pediatric) 01/01/1998     Influenza high dose, seasonal 01/15/2016     Influenza, seasonal,quad inj 6-35 mos 11/07/2014     Pneumo Conj 13-V (2010&after) 11/07/2014     Pneumo Polysac 23-V 01/15/2016     Td, historic 01/01/1998       Past Medical History: No past medical history on file.    Past Surgical History: No past surgical history on file.    Family History:   Family History   Problem Relation Age of Onset     Cancer Father      bladder     Skin cancer Father      Prostate cancer Paternal Grandfather        Social History:   Social History     Social History     Marital status:      Spouse name: N/A     Number of children: N/A     Years of education: N/A     Occupational History     Not on file.     Social History Main Topics     Smoking status: Never Smoker     Smokeless tobacco: Not on file     Alcohol use Not on file     Drug use: Not on file     Sexual activity: Not on file     Other Topics Concern     Not on file     Social History Narrative         Joesph Lange MD

## 2021-06-12 NOTE — TELEPHONE ENCOUNTER
New Appointment Needed  What is the reason for the visit:    Establish Care Physical  Provider Preference: Dr Akins, patient previously saw Dr Lange, she would like to Establish Care w/Dr Akins  How soon do you need to be seen?: when available  Waitlist offered?: No  Okay to leave a detailed message:  Yes

## 2021-06-12 NOTE — TELEPHONE ENCOUNTER
Can you please help this patient schedule with a provider taking new patients? Thank you.    Magali Rojo, CMA

## 2021-06-12 NOTE — TELEPHONE ENCOUNTER
Please let her know that my panel is closed.  Help her set up an appointment with one of the doctors taking new patients.  Thanks.

## 2021-06-13 NOTE — PROGRESS NOTES
"Munira Rodriguez is a 70 y.o. female who is being evaluated via a billable video visit.      The patient has been notified of following:     \"This video visit will be conducted via a call between you and your physician/provider. We have found that certain health care needs can be provided without the need for an in-person physical exam.  This service lets us provide the care you need with a video conversation.  If a prescription is necessary we can send it directly to your pharmacy.  If lab work is needed we can place an order for that and you can then stop by our lab to have the test done at a later time.    Video visits are billed at different rates depending on your insurance coverage. Please reach out to your insurance provider with any questions.    If during the course of the call the physician/provider feels a video visit is not appropriate, you will not be charged for this service.\"    Patient has given verbal consent to a Video visit? Yes  How would you like to obtain your AVS? AVS Preference: MyChart.  If dropped by the video visit, the video invitation should be sent to: Text to cell phone: [phone]  Will anyone else be joining your video visit? No      Video Start Time: 11:01 AM        Video-Visit Details    Type of service:  Video Visit    Video End Time (time video stopped): 11:35 AM  Originating Location (pt. Location): Home    Distant Location (provider location):  [facility]     Platform used for Video Visit: amwell   Very pleasant patient of Dr. Gonzalez here to establish care.  She is in her normal state of health.  She is keeping safe in her own home with her .  Her  has had stroke 7 years ago and has had some cognitive decline.  She has to do all the IADLs.  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  They have been tested multiple times for Covid and have been negative although there was an " outbreak in their facility.  She used to be very active and worked in her own [employer] in [place] in the summer but in the winter has been's more sedentary and is not going to the [facility] because of Covid.  She denies any symptoms like chest pain, shortness of breath change in bowel habits.  Is compliant with her medications.      Her biggest issues have been osteoarthritis related as had an knee replacement hip replacement lower back problems her biggest concern now is bilateral shoulder pain and stiffness.  She had run out of sulindac which had been prescribed by Dr. Arauz and the pain got back.  Sulindac was held for a short while because of the azotemia.  But now her last creatinine was normal.  She does not have significant 1 hour morning stiffness and no small joint pain.  Her low back pain got improved with physical therapy and does daily exercise .   She takes losartan for well-controlled hypertension and Lasix for edema due to a soft tissue injury    Past medical history, social history and health maintenance was reviewed.  She has no other chronic disease like diabetes COPD or ischemic heart disease        Assess/plan  1. Essential hypertension  Appears to be well controlled on losartan.  She is not on a statin or aspirin    2. Chronic renal insufficiency, stage 1  Her last creatinine and GFR were normal but they were checked a year ago we will repeat again at her next visit.    3. Healthcare maintenance  mammo annua;   colon 2017 polyp next in 5 years   dexa in the past   Immunization tdap and shingrix due , rest complete    4. Leg edema  She does have a history of a DVT and was on Coumadin remotely but she says the edema is due to more like a soft injury that she had in her legs.  So she takes 40 mg of Lasix for that we did discuss that that might lead to higher dose and contribute to her kidney disease she could try half a tablet and that me know if that helps.  Meanwhile compression stockings and  leg elevation may be safer alternative    5. Chronic pain of both shoulders  She cannot restart the sulindac we will just continue to monitor the creatinine.  She does not have enough pain or stiffness to justify orthopedic referral we did talk about the pathophysiology and progression of the disease and also talked about some simple exercises she can do    6. Gastroesophageal reflux disease with esophagitis without hemorrhage  She tried to stop the omeprazole but it caused a relapse of significant heartburn.  We did talk about the long-term side effects of omeprazole including kidney disease and recommended a trial of famotidine which I will send.  Overall there is no concerns.  She can come for annual wellness in December and have her fasting labs done then.      Marcela Amato MD

## 2021-06-13 NOTE — TELEPHONE ENCOUNTER
RN cannot approve Refill Request    RN can NOT refill this medication med is not covered by policy/route to provider. Last office visit: Visit date not found Last Physical: Visit date not found Last MTM visit: Visit date not found Last visit same specialty: 7/11/2019 Joesph Lange MD.  Next visit within 3 mo: Visit date not found  Next physical within 3 mo: Visit date not found      Tracy Noguera, [facility] Triage/Med Refill 12/15/2020    Requested Prescriptions   Pending Prescriptions Disp Refills     sulindac (CLINORIL) 200 MG tablet [Pharmacy Med Name: SULINDAC 200MG] 60 tablet 0     Sig: TAKE 1 TABLET (200 MG TOTAL) BY MOUTH 2 (TWO) TIMES A DAY.       There is no refill protocol information for this order

## 2021-06-13 NOTE — PROGRESS NOTES
Assessment and Plan:     Patient has been advised of split billing requirements and indicates understanding: Yes  1. Mixed hyperlipidemia  Has borderline elevated LDL with normal HDL.  Will probably have a high ASCVD score.  We talked about risk assessment.  I do not think she needs a stress test she has very nonspecific chest pain that is mostly tenderness in the ribs.  I do feel low-dose calcium score would be more useful and low-dose statin.  Patient agrees with plan.  - Lipid [place] FASTING  - Comprehensive Metabolic Panel  - HM1(CBC and Differential)  - aspirin 81 MG EC tablet; Take 1 tablet (81 mg total) by mouth daily.  Dispense: 90 tablet; Refill: 3  - furosemide (LASIX) 20 MG tablet; Take 1 tablet (20 mg total) by mouth daily.  Dispense: 90 tablet; Refill: 11  - Thyroid Stimulating Hormone (TSH)  - CT Cardiac Calcium Score; Future    2. Age-related osteoporosis without current pathological fracture  She is up-to-date on all health maintenance we talked about Shingrix and Tdap at a later date.  But DEXA scan is due.  We talked about health risks of osteoporosis covered briefly Fosamax if needed.  - DXA Bone Density Scan; Future    3. Vitamin D insufficiency  We will check her baseline vitamin D  - Vitamin D, Total (25-Hydroxy)    4. Prediabetes  Check her baseline A1c given slightly high.  Fasting blood sugars  - Glycosylated Hemoglobin A1c    5. Chronic renal insufficiency, stage 1  And she has held the sulindac many times but she finds the benefit of it outweighs any risk.  We will continue to monitor.  I had reduced her Lasix.  And she has not had elevation of her blood pressure or the worsening of her pedal edema.    6. Essential hypertension  Initial reading is usually high and then normalizes and she is not on potassium because she has never had hypokalemia with the Lasix and losartan combination.    7. Encounter for annual wellness exam in Medicare patient  She is in overall good health good  lifestyle she is not very active but very active she is active in the summer she has not had falls I do not she is fully independent in her ADLs and IADLs.        The patient's current medical problems were reviewed.    I have had an Advance Directives discussion with the patient.  The following health maintenance schedule was reviewed with the patient and provided in printed form in the after visit summary:   Health Maintenance   Topic Date Due     HEPATITIS C SCREENING  1950     DEXA SCAN  1950     ZOSTER VACCINES (1 of 2) 06/19/1999     TD 18+ HE  01/01/2008     MEDICARE ANNUAL WELLNESS VISIT  12/18/2021     FALL RISK ASSESSMENT  12/18/2021     MAMMOGRAM  12/08/2022     LIPID  09/23/2024     ADVANCE CARE PLANNING  09/23/2024     COLORECTAL CANCER SCREENING  05/31/2027     Pneumococcal Vaccine: 65+ Years  Completed     INFLUENZA VACCINE RULE BASED  Completed     Pneumococcal Vaccine: Pediatrics (0 to 5 Years) and At-Risk Patients (6 to 64 Years)  Aged Out     HEPATITIS B VACCINES  Aged Out        Subjective:   Chief Complaint: Munira Rodriguez is an 70 y.o. female here for an Annual Wellness visit.   HPI: Very pleasant patient started here for her annual wellness visit she recently established with me on a virtual visit.  She is in good health good health she has a remote history of pulmonary embolism she has well-controlled hypertension no known ischemic heart disease.  No known history of stroke.  Her diabetes    Review of Systems:    Please see above.  The rest of the review of systems are negative for all systems.    Patient Care Team:  Marcela Amato MD as PCP - General (Internal Medicine)     Patient Active Problem List   Diagnosis     Group A Streptococcus: B Hemolytic Pharyngitis     Acute Sore Throat     Dyspepsia     Menopause     Neck Pain     Chest Pain     Essential Hypertension     Chronic renal insufficiency, stage 1     Left knee pain     Rectocele     Bilateral shoulder pain  "    Leg edema     Past Medical History:   Diagnosis Date     Acid reflux      Arthritis      History of blood clots 1980    Spontaneous \"thrombophlebitis\" treated with Coumadin for 3 months     History of pulmonary embolus (PE) 1972    Associated with [place] shield     Hypertension      Psoriasis      Rectocele      Thrombophlebitis 1980      Past Surgical History:   Procedure Laterality Date     APPENDECTOMY       JOINT REPLACEMENT       KNEE ARTHROSCOPY       REPLACEMENT TOTAL KNEE Left 1/2/2020    Procedure: LEFT TOTAL KNEE ARTHROPLASTY, COMPUTER-ASSIST;  Surgeon: Jb Luevano MD;  Location: [facility] OR;  Service: Orthopedics      Family History   Problem Relation Age of Onset     Cancer Father         bladder     Skin cancer Father      Prostate cancer Paternal Grandfather       Social History     Socioeconomic History     Marital status:      Spouse name: Not on file     Number of children: Not on file     Years of education: Not on file     Highest education level: Not on file   Occupational History     Not on file   Social Needs     Financial resource strain: Not on file     Food insecurity     Worry: Not on file     Inability: Not on file     Transportation needs     Medical: Not on file     Non-medical: Not on file   Tobacco Use     Smoking status: Never Smoker     Smokeless tobacco: Never Used   Substance and Sexual Activity     Alcohol use: Yes     Comment: Wine 1 glass daily      Drug use: Never     Sexual activity: Not on file   Lifestyle     Physical activity     Days per week: Not on file     Minutes per session: Not on file     Stress: Not on file   Relationships     Social connections     Talks on phone: Not on file     Gets together: Not on file     Attends Sikh service: Not on file     Active member of club or organization: Not on file     Attends meetings of clubs or organizations: Not on file     Relationship status: Not on file     Intimate partner violence     Fear of " "current or ex partner: Not on file     Emotionally abused: Not on file     Physically abused: Not on file     Forced sexual activity: Not on file   Other Topics Concern     Not on file   Social History Narrative    Worked as a clinical director at [employer]      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        diphenhydrAMINE (BENADRYL) 25 mg capsule Take 25 mg by mouth at bedtime.       DOCOSAHEXANOIC ACID/EPA (FISH OIL ORAL) Take 1,000 mg by mouth 2 (two) times a day.        furosemide (LASIX) 40 MG tablet Take 1 tablet (40 mg total) by mouth daily. 100 tablet 5     losartan (COZAAR) 100 MG tablet Take 1 tablet (100 mg total) by mouth daily. 90 tablet 3     melatonin 10 mg Tab Take 1 tablet by mouth at bedtime.       multivitamin therapeutic (THERAGRAN) tablet Take 1 tablet by mouth daily.       omeprazole (PRILOSEC) 20 MG capsule TAKE ONE CAPSULE ORALLY DAILY 90 capsule 3     senna (SENOKOT) 8.6 mg tablet Take 1 tablet by mouth daily.       sulindac (CLINORIL) 200 MG tablet TAKE 1 TABLET (200 MG TOTAL) BY MOUTH 2 (TWO) TIMES A DAY. 60 tablet 0     No current facility-administered medications for this visit.       Objective:   Vital Signs:   Visit Vitals  /88 (Patient Site: Left Arm, Patient Position: Sitting, Cuff Size: Adult Large)   Pulse 70   Temp 97.4  F (36.3  C) (Tympanic)   Ht 5' 5.25\" (1.657 m)   Wt 208 lb (94.3 kg)   SpO2 99%   BMI 34.35 kg/m           VisionScreening:  No exam data present     PHYSICAL EXAM  Physical Examination: General appearance - alert, well appearing, and in no distress  Mental status - alert, oriented to person, place, and time  Neck - supple, no significant adenopathy  Lymphatics - no palpable lymphadenopathy, no hepatosplenomegaly  Chest - clear to auscultation, no wheezes, " rales or rhonchi, symmetric air entry  Heart - normal rate, regular rhythm, normal S1, S2, no murmurs, rubs, clicks or gallops  Abdomen - soft, nontender, nondistended, no masses or organomegaly  Breasts - breasts appear normal, no suspicious masses, no skin or nipple changes or axillary nodes  Back exam - full range of motion, no tenderness, palpable spasm or pain on motion  Neurological - alert, oriented, normal speech, no focal findings or movement disorder noted  Musculoskeletal - no joint tenderness, deformity or swelling  Extremities - peripheral pulses normal, no pedal edema, no clubbing or cyanosis  Skin - normal coloration and turgor, no rashes, no suspicious skin lesions noted sees a dermatologist     Assessment Results 12/18/2020   Activities of Daily Living No help needed   Instrumental Activities of Daily Living No help needed   Get Up and Go Score Less than 12 seconds   Mini Cog Total Score 5   Some recent data might be hidden     A Mini-Cog score of 0-2 suggests the possibility of dementia, score of 3-5 suggests no dementia    Identified Health Risks:     Patient's advanced directive was discussed and I am comfortable with the patient's wishes.

## 2021-06-15 NOTE — PROGRESS NOTES
"Office Visit - Follow up    Munira Rodriguez   67 y.o. 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  Previous history includes mild dyspepsia and transient renal insufficiency creatinine of 1.22 this has returned to normal.  Is postmenopausal and is seen by her gynecologist who has recently stopped hormone replacement    Chief complaint today is a feeling of tightness in the chest region.  This is nonexertional and occurs generally in the morning and throughout the day without relation to activity or meals.  The discomfort is not pleuritic and is not described as painful.  She notes a slight C feeling of tightness that appears to come and go without pattern it is not necessarily related to any specific activity or bilaterally function.  Denies symptoms of heartburn or dyspepsia.  No change in functional capacity or dyspnea    Resting EKG today is normal    Otherwise feels well without complaints    ROS: A comprehensive review of systems was performed and was otherwise negative except as mentioned above.     Exam  Head and neck normal chest wall negative I cannot reproduce her symptoms of discomfort with pressure or torsion of the chest wall heart normal lungs clear   /80  Pulse 66  Ht 5' 5\" (1.651 m)  Wt 210 lb (95.3 kg)  BMI 34.95 kg/m2    Assessment and Plan  Suspect musculoskeletal discomfort I will have her begin nonsteroidal therapy with sulindac twice daily with food and follow-up in the next 2-3 weeks no other changes or concerns she will call if there is any change in the quality or intensity of her discomfort    Munira was seen today for chest pain.    Diagnoses and all orders for this visit:    Chest pressure  -     Electrocardiogram Perform - Clinic    Dyspepsia  -     omeprazole (PRILOSEC) 20 MG capsule; TAKE ONE CAPSULE ORALLY " DAILY    Other orders  -     furosemide (LASIX) 40 MG tablet; take one tablet by mouth once daily  -     losartan (COZAAR) 100 MG tablet; TAKE 1 TABLET ONCE DAILY.  -     Influenza High Dose, Seasonal 65+ yrs          Time: total time spent with the patient was 25 minutes of which >50% was spent in counseling and coordination of care        No Known Allergies    Medications :  Prior to Admission medications    Medication Sig Start Date End Date Taking? Authorizing Provider   aspirin 81 MG EC tablet Take 81 mg by mouth daily.   Yes PROVIDER, HISTORICAL   calcium carbonate-vitamin D3 (CALCIUM 600 + D,3,) 600 mg calcium- 200 unit cap Take by mouth 2 (two) times a day.   Yes PROVIDER, HISTORICAL   DOCOSAHEXANOIC ACID/EPA (FISH OIL ORAL) Take by mouth 2 (two) times a day.   Yes PROVIDER, HISTORICAL   furosemide (LASIX) 40 MG tablet take one tablet by mouth once daily 1/2/18  Yes Joesph Lange MD   GLUC/CHND/OM3/DHA/EPA/FISH/STR (GLUCOSAMINE CHONDROITIN PLUS ORAL) Take by mouth daily.   Yes PROVIDER, HISTORICAL   losartan (COZAAR) 100 MG tablet TAKE 1 TABLET ONCE DAILY. 1/2/18  Yes Joesph Lange MD   multivitamin therapeutic (THERAGRAN) tablet Take 1 tablet by mouth daily.   Yes PROVIDER, HISTORICAL   omeprazole (PRILOSEC) 20 MG capsule TAKE ONE CAPSULE ORALLY DAILY 1/2/18  Yes Joesph Lange MD   sulindac (CLINORIL) 200 MG tablet TAKE 1 TABLET BY MOUTH TWICE DAILY  Patient taking differently: 200 mg. TAKE 1 TABLET BY MOUTH DAILY 3/21/17  Yes Joesph Lange MD   furosemide (LASIX) 40 MG tablet take one tablet by mouth once daily 5/20/17 1/2/18 Yes Joesph Lange MD   losartan (COZAAR) 100 MG tablet TAKE 1 TABLET ONCE DAILY. 6/5/17 1/2/18 Yes Joesph Lange MD   omeprazole (PRILOSEC) 20 MG capsule TAKE ONE CAPSULE ORALLY DAILY 3/21/17 1/2/18 Yes Joesph Lange MD   estradiol (CLIMARA) 0.025 mg/24 hr Place 1 patch on the skin once a week.  1/2/18  PROVIDER, HISTORICAL   furosemide (LASIX) 40 MG tablet take one  tablet by mouth once daily 3/21/17 1/2/18  Joesph Lange MD   losartan (COZAAR) 100 MG tablet TAKE 1 TABLET ONCE DAILY. 3/21/17 1/2/18  Joesph Lange MD   progesterone (PROMETRIUM) 200 MG capsule Take 200 mg by mouth daily.  1/2/18  PROVIDER, HISTORICAL        Past Medical History: No past medical history on file.    Past Surgical History: No past surgical history on file.    Social History:   Social History     Social History     Marital status:      Spouse name: N/A     Number of children: N/A     Years of education: N/A     Occupational History     Not on file.     Social History Main Topics     Smoking status: Never Smoker     Smokeless tobacco: Never Used     Alcohol use Not on file     Drug use: Not on file     Sexual activity: Not on file     Other Topics Concern     Not on file     Social History Narrative       Family History:   Family History   Problem Relation Age of Onset     Cancer Father      bladder     Skin cancer Father      Prostate cancer Paternal Grandfather          Joesph Lange MD

## 2021-06-15 NOTE — TELEPHONE ENCOUNTER
RN cannot approve Refill Request    RN can NOT refill this medication med is not covered by policy/route to provider. Last office visit: Visit date not found Last Physical: 12/18/2020 Last MTM visit: Visit date not found Last visit same specialty: 7/11/2019 Joesph Lange MD.  Next visit within 3 mo: Visit date not found  Next physical within 3 mo: Visit date not found      Ligia Ba, [facility] Triage/Med Refill 2/15/2021    Requested Prescriptions   Pending Prescriptions Disp Refills     sulindac (CLINORIL) 200 MG tablet [Pharmacy Med Name: SULINDAC 200MG] 60 tablet 0     Sig: TAKE 1 TABLET (200 MG TOTAL) BY MOUTH 2 (TWO) TIMES A DAY.       There is no refill protocol information for this order

## 2021-06-15 NOTE — PROGRESS NOTES
Office Visit - Follow Up   Munira Rodriguez   70 y.o. female    Date of Visit: 2/24/2021         Assessment and Plan   1. Neck mass  Feels like a subcutaneous lipoma but the location requires imaging to exclude a lymph node enlargement . She was reassured that there were no other lymph nodes and no suspicious symptoms to suggest systemic disease   - US Neck Lymph Node Mapping; Future              Return for symptoms that worsen or do not improve, Next scheduled follow up.     History of Present Illness   This 70 y.o. old   Chief Complaint   Patient presents with     Mass     neck, left side    noticed a few weeks ago , no change in size , no fever , non tender , no sore throat , no cough , cold , dentist also felt it on routine cleaning . No cavities or tooth infection . No weight changes, night sweats  . No recent vaccination     Review of Systems: A comprehensive review of systems was negative except as noted.     Medications, Allergies and Problem List   Reviewed, reconciled and updated  Patient Active Problem List   Diagnosis     Chest Pain     Essential Hypertension     Chronic renal insufficiency, stage 1     Left knee pain     Rectocele     Bilateral shoulder pain     Leg edema     Agatston coronary artery calcium score between 200 and 399     Incidental lung nodule, less than or equal to 3mm     Obesity (BMI 35.0-39.9) with comorbidity (H)        Physical Exam   General Appearance:       /74 (Patient Site: Left Arm, Patient Position: Sitting, Cuff Size: Adult Regular)   Pulse 68   Temp 98.5  F (36.9  C) (Tympanic)   Wt 215 lb 1.6 oz (97.6 kg)   SpO2 96%   BMI 35.52 kg/m      General appearance - alert, well appearing, and in no distress  Mental status - alert, oriented to person, place, and time  Neck - supple, small 2cm oval shaped smooth non tender , firm but ballotable lump felt left anterior superior cervical lymph node area . It is superficially and not fixed to underlying tissue or skin  .  No overlying skin changes   Lymphatics - no palpable lymphadenopathy in axillae or groin , no hepatosplenomegaly  Chest - clear to auscultation, no wheezes, rales or rhonchi, symmetric air entry  Heart - normal rate, regular rhythm, normal S1, S2, no murmurs, rubs, clicks or gallops  Abdomen - soft, nontender, nondistended, no masses or organomegaly  Breasts - breasts appear normal, no suspicious masses, no skin or nipple changes or axillary nodes                                                                                         Additional Information   Current Outpatient Medications   Medication Sig Dispense Refill     aspirin 81 MG EC tablet Take 1 tablet (81 mg total) by mouth daily. 90 tablet 3     atorvastatin (LIPITOR) 20 MG tablet Take 1 tablet (20 mg total) by mouth daily. 90 tablet 11     calcium carbonate-vitamin D3 (CALCIUM 600 + D,3,) 600 mg calcium- 200 unit cap Take 1 tablet by mouth 2 (two) times a day.        diphenhydrAMINE (BENADRYL) 25 mg capsule Take 25 mg by mouth at bedtime.       DOCOSAHEXANOIC ACID/EPA (FISH OIL ORAL) Take 1,000 mg by mouth 2 (two) times a day.        furosemide (LASIX) 20 MG tablet Take 1 tablet (20 mg total) by mouth daily. 90 tablet 11     losartan (COZAAR) 100 MG tablet Take 1 tablet (100 mg total) by mouth daily. 90 tablet 3     melatonin 10 mg Tab Take 1 tablet by mouth at bedtime.       multivitamin therapeutic (THERAGRAN) tablet Take 1 tablet by mouth daily.       omeprazole (PRILOSEC) 20 MG capsule TAKE ONE CAPSULE ORALLY DAILY 90 capsule 3     senna (SENOKOT) 8.6 mg tablet Take 1 tablet by mouth daily.       sulindac (CLINORIL) 200 MG tablet TAKE 1 TABLET (200 MG TOTAL) BY MOUTH 2 (TWO) TIMES A DAY. 60 tablet 0     No current facility-administered medications for this visit.      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 She has never used smokeless tobacco. She reports current alcohol use. She reports that she does not use drugs.   Past Medical History:   Diagnosis Date     Acid reflux      Arthritis      History of blood clots 1980    Spontaneous \"thrombophlebitis\" treated with Coumadin for 3 months     History of pulmonary embolus (PE) 1972    Associated with Dalkon shield     Hypertension      Psoriasis      Rectocele      Thrombophlebitis 1980           Time:      Marcela Amato MD    "

## 2021-06-15 NOTE — TELEPHONE ENCOUNTER
Called patient about US neck . Nodule doesn't appear t be a lipoma or lymph node . Not clear what it is but has bengin features   Offered surgical excision vesus monitoring in 3 months . . She would like to monitor

## 2021-06-16 PROBLEM — M25.511 BILATERAL SHOULDER PAIN: Status: ACTIVE | Noted: 2020-11-16

## 2021-06-16 PROBLEM — R91.1 INCIDENTAL LUNG NODULE, LESS THAN OR EQUAL TO 3MM: Status: ACTIVE | Noted: 2021-01-14

## 2021-06-16 PROBLEM — N81.6 RECTOCELE: Status: ACTIVE | Noted: 2019-01-01

## 2021-06-16 PROBLEM — M25.562 LEFT KNEE PAIN: Status: ACTIVE | Noted: 2020-01-02

## 2021-06-16 PROBLEM — M25.512 BILATERAL SHOULDER PAIN: Status: ACTIVE | Noted: 2020-11-16

## 2021-06-16 PROBLEM — R60.0 LEG EDEMA: Status: ACTIVE | Noted: 2020-11-17

## 2021-06-16 PROBLEM — R93.1 AGATSTON CORONARY ARTERY CALCIUM SCORE BETWEEN 200 AND 399: Status: ACTIVE | Noted: 2021-01-14

## 2021-06-16 PROBLEM — E66.01 MORBID OBESITY (H): Status: ACTIVE | Noted: 2021-02-24

## 2021-06-16 NOTE — TELEPHONE ENCOUNTER
RN cannot approve Refill Request    RN can NOT refill this medication Protocol failed and NO refill given. Last office visit: 2/24/2021 Marcela Amato MD Last Physical: 12/18/2020 Last MTM visit: Visit date not found Last visit same specialty: 2/24/2021 Marcela Amato MD.  Next visit within 3 mo: Visit date not found  Next physical within 3 mo: Visit date not found      Veronica Finley, [facility] Triage/Med Refill 3/22/2021    Requested Prescriptions   Pending Prescriptions Disp Refills     sulindac (CLINORIL) 200 MG tablet [Pharmacy Med Name: SULINDAC 200MG] 60 tablet 0     Sig: TAKE 1 TABLET (200 MG TOTAL) BY MOUTH 2 (TWO) TIMES A DAY.       There is no refill protocol information for this order

## 2021-06-17 NOTE — TELEPHONE ENCOUNTER
RN cannot approve Refill Request    RN can NOT refill this medication Protocol failed and NO refill given. Last office visit: 2/24/2021 Marcela Amato MD Last Physical: 12/18/2020 Last MTM visit: Visit date not found Last visit same specialty: 2/24/2021 Marcela Amato MD.  Next visit within 3 mo: Visit date not found  Next physical within 3 mo: Visit date not found      Veronica Finley, [facility] Triage/Med Refill 5/17/2021    Requested Prescriptions   Pending Prescriptions Disp Refills     sulindac (CLINORIL) 200 MG tablet [Pharmacy Med Name: SULINDAC 200MG] 60 tablet 0     Sig: TAKE 1 TABLET (200 MG TOTAL) BY MOUTH 2 (TWO) TIMES A DAY.       There is no refill protocol information for this order

## 2021-06-18 NOTE — PATIENT INSTRUCTIONS - HE
Patient Instructions by Marcela Amato MD at 12/18/2020  9:40 AM     Author: Marcela Amato MD Service: -- Author Type: Physician    Filed: 12/18/2020 10:13 AM Encounter Date: 12/18/2020 Status: Addendum    : Marcela Amato MD (Physician)    Related Notes: Original Note by Marcela Amato MD (Physician) filed at 12/18/2020  9:57 AM       Tetanus with pertussis ( TDAP ) and shingles ( shingrix ) two shot series  Given at the pharmacy     Advance Directive  Patients advance directive was discussed and I am comfortable with the patients wishes.  Patient Education   Personalized Prevention Plan  You are due for the preventive services outlined below.  Your care team is available to assist you in scheduling these services.  If you have already completed any of these items, please share that information with your care team to update in your medical record.  Health Maintenance   Topic Date Due   ? HEPATITIS C SCREENING  1950   ? DEXA SCAN  1950   ? ZOSTER VACCINES (1 of 2) 06/19/1999   ? TD 18+ HE  01/01/2008   ? MEDICARE ANNUAL WELLNESS VISIT  12/18/2021   ? FALL RISK ASSESSMENT  12/18/2021   ? MAMMOGRAM  12/08/2022   ? LIPID  09/23/2024   ? ADVANCE CARE PLANNING  12/18/2025   ? COLORECTAL CANCER SCREENING  05/31/2027   ? Pneumococcal Vaccine: 65+ Years  Completed   ? INFLUENZA VACCINE RULE BASED  Completed   ? Pneumococcal Vaccine: Pediatrics (0 to 5 Years) and At-Risk Patients (6 to 64 Years)  Aged Out   ? HEPATITIS B VACCINES  Aged Out        Patient Education     Exercises for Shoulder Flexibility: External Rotation    This stretch can help restore shoulder flexibility and relieve pain over time. When stretching, be sure to breathe deeply. Follow any special instructions from your healthcare provider or physical therapist:  1.  a doorway. Grasp the doorjamb with the hand on the frozen side. Your arm should be bent.  2. With the other hand, hold the elbow on the side  with the involved frozen (stiff) shoulder firmly against your body.  3. Standing in the same spot, rotate your body away from the doorjamb. Stop when you feel the stretch in the shoulder. At first, try to hold the stretch for 5 seconds.  4. Work up to doing 3 sets of this stretch, 3 times a day. Work up to holding the stretch for 30 to 60 seconds.  Note: Keep your arms as still as you can. Over time, rotate your body a little more to enhance the stretch. But be careful not to twist your back.  Frozen shoulder is another name for adhesive capsulitis, which causes restricted movement in the shoulder. If you have frozen shoulder, this stretch may cause discomfort, especially when you first get started. A few months may pass before you achieve the results you want. But once your shoulder heals, it rarely becomes frozen again. So stick to your stretching program. If you have any questions, be sure to ask your healthcare provider.  Date Last Reviewed: 3/1/2018    5769-2183 [employer]. [address] [place] [address]. All rights reserved. This information is not intended as a substitute for professional medical care. Always follow your healthcare professional's instructions.           Patient Education     Exercises for Shoulder Flexibility: Internal Rotation    This stretch can help restore shoulder flexibility and relieve pain over time. When stretching, be sure to breathe deeply. Follow any special instructions from your healthcare provider or physical therapist.  1. While seated, move the arm on the side you want to stretch toward the middle of your back. The palm of your hand should face out.  2. Cup your other hand under the hand thats behind your back. Gently push your cupped hand upward until you feel the stretch in the shoulder. Try to hold the stretch for 5 seconds.  3. Work up to doing 3 sets of this stretch, 3 times a day. Work up to holding the stretch for 30 to 60 seconds.  Note: Keep  your back straight. Its OK if your hand cant reach the middle of your back. Instead, start the stretch with your hand as close as you can get it to the middle of your back.  Frozen shoulder  Frozen shoulder is another name for adhesive capsulitis. This causes restricted movement in the shoulder. If you have frozen shoulder, this stretch may cause discomfort, especially when you first get started. A few months may pass before you achieve the results you want. But once your shoulder heals, it rarely becomes frozen again. So stick to your stretching program. If you have any questions, be sure to ask your healthcare provider.   Date Last Reviewed: 12/1/2017 2000-2019 [employer]. [address] [place] [address]. All rights reserved. This information is not intended as a substitute for professional medical care. Always follow your healthcare professional's instructions.           Patient Education     Shoulder and Upper Back Stretch  To start, stand tall with your ears, shoulders, and hips in line. Your feet should be slightly apart, positioned just under your hips. Focus your eyes directly in front of you.  this position for a few seconds before starting your exercise. This helps increase your awareness of proper posture.          Reach overhead and slightly back with both arms. Keep your shoulders and neck aligned and your elbows behind your shoulders:    With your palms facing the ceiling, turn your fingers inward.    Take a deep breath. Breathe out, and lower your elbows toward your buttocks. Hold for 5 seconds, then return to starting position.    Repeat 3 times.  Date Last Reviewed: 11/1/2017 2000-2019 [employer]. [address] [place] [address]. All rights reserved. This information is not intended as a substitute for professional medical care. Always follow your healthcare professional's instructions.           Patient Education     Shoulder Clock  Exercise    To start, stand tall with your ears, shoulders, and hips in line. Your feet should be slightly apart, positioned just under your hips. Focus your eyes directly in front of you.  this position for a few seconds before starting your exercise. This helps increase your awareness of proper posture.    Imagine that your right shoulder is the center of a clock. With the outer point of your shoulder, roll it around to slowly trace the outer edge of the clock.    Move clockwise first, then counterclockwise.    Repeat 3 to 5 times. Switch shoulders.  Date Last Reviewed: 3/1/2018    3628-8103 [employer]. [address] [place] [address]. All rights reserved. This information is not intended as a substitute for professional medical care. Always follow your healthcare professional's instructions.           Patient Education     Shoulder Exercises    To start, sit in a chair with your feet flat on the floor. Your weight should be slightly forward so that youre balanced evenly on your buttocks. Relax your shoulders and keep your head level. Avoid arching your back or rounding your shoulders. Using a chair with arms may help you keep your balance.    Raise your arms, elbows bent, to shoulder height.    Slowly move your forearms together. Hold for 5 seconds.    Return to starting position. Repeat 5 times.  Date Last Reviewed: 3/1/2018    5628-6810 [employer]. [address] [place] [address]. All rights reserved. This information is not intended as a substitute for professional medical care. Always follow your healthcare professional's instructions.           Patient Education     Shoulder Exercises: Biceps Curl    This exercise stretches and strengthens your shoulders and arms. Before starting, read through all the instructions. During the exercise, breathe normally and use smooth movements. Stop if you feel any pain. If pain persists, call your healthcare  provider.  Here are the steps for the biceps curl:     Hold a ____ pound weight in each hand, with your palms facing your body. Tuck your arms close to your sides. Ask your physical therapist how much weight to use.     Bend your left elbow and raise the weight to your left shoulder. As you lower that weight, bend your right elbow and raise the weight to your right shoulder. Continue to alternate arms.    Repeat ____ times. Do ____ sets ____ times a day.     CAUTION: Keep your arms close to your body throughout the exercise. Keep your wrists straight.   Date Last Reviewed: 11/1/2017 2000-2019 [employer]. [address] [place] [address], [place] [address]. All rights reserved. This information is not intended as a substitute for professional medical care. Always follow your healthcare professional's instructions.           Patient Education     Understanding Frozen Shoulder    Frozen shoulder is a condition where the shoulder becomes painful and hard to move. The condition is sometimes called adhesive capsulitis.  The shoulder is a joint that is made up of many parts. These parts allow you to raise, rotate, and swing your arm. The parts of a normal shoulder are:    Humeral head. The ball at the top of the upper arm bone (humerus).    Scapula. The shoulder blade.    Glenoid. The shallow socket on the scapula. (The humeral head rests on the glenoid.)    Capsule. A sheet of tough tissue that encloses the joint and joins the ball to the socket.  With frozen shoulder, the capsule thickens, and shrinks and pulls in (contracts). It's not clear why this happens. It may be from swelling and irritation, or from scar tissue forming. Over time, this may result in pain, stiffness, and loss of movement in the shoulder.  What causes frozen shoulder?  Experts dont know for sure why frozen shoulder occurs. Some things can make the condition more likely. These include:    Being a woman    Being 40 to 60 years old    Having  certain health conditions, such as diabetes or thyroid disease    Taking certain medicines    Not using the shoulder for a prolonged period of time, such as after an injury or surgery  Symptoms of frozen shoulder  Frozen shoulder typically occurs in 3 stages. Each stage will vary, but often lasts a few months or longer:    Freezing stage. The shoulder is very painful. Pain often gets worse when moving your arm and at night during sleep. The shoulder gradually becomes stiffer.    Frozen stage. The shoulder is very stiff and hard to move. Pain may be less than in the first stage. It may be hard to do daily tasks, such as dressing or bathing.    Thawing stage. Pain and stiffness slowly get better. In time, normal or almost normal use of the shoulder usually returns.  Treatment for frozen shoulder  Most cases of frozen shoulder get better, even with no treatment. Treatment is done to help speed healing and help regain as much joint movement as possible. The best course of treatment will depend on your needs. It may include one or more of the following:    Prescription or over-the-counter medicines. These help relieve pain and reduce swelling. NSAIDs (nonsteroidal anti-inflammatory drugs) are the most common medicines used. Medicines may be prescribed or bought over the counter. They may be given as pills. Or they may be put on the skin as a gel, cream, or patch.    Stretching exercises. These help restore movement to the shoulder. You may do them on your own or under the care of a physical therapist.    Cortisone shots. These are given into your shoulder joint. They cant cure frozen shoulder. But they can help give short-term relief from symptoms and allow you to do exercises without too much pain.    Cold packs and heat packs. These can help relieve symptoms.  Keep in mind that getting better is a slow process. It can take a year or longer. If your shoulder doesnt get better on its own in this time, you may need  surgery. This is done to loosen the tight tissues in the shoulder joint.  When to call your healthcare provider  Call your healthcare provider right away if you have any of these:    Fever of 100.4 F (38 C) or higher, or as directed by your provider    Chills    Symptoms that dont get better, or get worse    New symptoms  Date Last Reviewed: 3/10/2016    6223-6843 [employer]. [address] [place] [address], [place] [address]. All rights reserved. This information is not intended as a substitute for professional medical care. Always follow your healthcare professional's instructions.           Patient Education     After Knee Replacement: At Home Exercise Program    Apply the skills you learned in the hospital or rehab center to your exercise program at home. Use a walker, cane, or crutches to help you move safely. By sticking with your exercise program, youll walk more easily and return to an active life sooner.  Maintaining your exercise program  Make exercise part of your daily routine. Lack of exercise can cause joint stiffness and decreased range of motion.  But with continued exercise, you may even gain more strength and range of motion than you had before surgery. Keep meeting with your physical therapist as directed. He or she may add riding a stationary bike or other new exercises to your program.  Quadriceps sets  Laying on your back, tighten your thigh muscles.  Try to straighten your knee.  Hold for 5 to 10 seconds and gradually build up your time.  Straight leg raises  Tighten your thigh and straighten your knee as with the quadriceps set above.  Lift your leg several inches off the bed and hold for 5 to 10 seconds.  Gradually increase your time and number of repititions.  Ankle pumps  Move your foot up and down by alternately gracie your calf and shin muscles.  Do this often to help remove lower leg swelling.  Sitting knee exercises  Do sitting knee exercises along with your other exercises.  Start with 10 repetitions, and then build up to 25 repetitions per session. Do 2 sessions each day.    Sit in a chair with both feet flat on the floor.    Slowly straighten your operated leg as much as you can. Hold for 5 seconds.    Slowly bend your leg under the chair, bringing it back as far as you can. Hold for 10 to 20 seconds. Return your leg to the starting point.  Date Last Reviewed: 3/1/2018    0514-1978 [employer]. [address] [place] [address]. All rights reserved. This information is not intended as a substitute for professional medical care. Always follow your healthcare professional's instructions.           Patient Education     Back Exercises: Knee Lift         To start, lie on your back with your knees bent and feet flat on the floor. Dont press your neck or lower back to the floor. Breathe deeply. You should feel comfortable and relaxed in this position:    Start by tightening your abdominal muscles.    Lift one bent knee off the floor 2 to 4 inches.    Hold for 10 seconds. Return to start position.    Repeat 3 times.    Switch legs.  Date Last Reviewed: 3/1/2018    0058-1942 [employer]. [address] [place] [address]. All rights reserved. This information is not intended as a substitute for professional medical care. Always follow your healthcare professional's instructions.           Patient Education     Leg and Knee Exercises: Step-Ups    This exercise is designed to stretch and strengthen your knee. Before starting, read through all the instructions. While exercising, breathe normally and use smooth movements. If you feel any pain, stop the exercise. If pain persists, tell your healthcare provider.  1. After a brief warm-up, such as brisk walking for a few minutes, stand with one foot on a 3-inch to 5-inch support (such as a block of wood) and the other foot flat on the floor.  2. Shift your weight onto the foot on the block, straightening  that knee and raising your other foot off the floor. Then slowly lower the foot until only the heel touches the floor.  3. Return to starting position.  4. Repeat ______ times on each side. Do ______ sets a day.    Dont lock your knees.    Keep your weight on the foot thats on the block-- dont push off from the floor.  Date Last Reviewed: 2/1/2018 2000-2019 [employer]. [address] [place] [address]. All rights reserved. This information is not intended as a substitute for professional medical care. Always follow your healthcare professional's instructions.

## 2021-06-19 NOTE — LETTER
Letter by Joesph Lange MD at      Author: Joesph Lange MD Service: -- Author Type: --    Filed:  Encounter Date: 9/24/2019 Status: Signed         Munira Rodriguez  [address] [facility] [address]             September 24, 2019         Dear Ms. Rodriguez,    Below are the results from your recent visit:    Resulted Orders   Erythrocyte Sedimentation Rate   Result Value Ref Range    Sed Rate 25 (H) 0 - 20 mm/hr   HM2(CBC w/o Differential)   Result Value Ref Range    WBC 5.7 4.0 - 11.0 thou/uL    RBC 4.41 3.80 - 5.40 mill/uL    Hemoglobin 13.1 12.0 - 16.0 g/dL    Hematocrit 40.1 35.0 - 47.0 %    MCV 91 80 - 100 fL    MCH 29.8 27.0 - 34.0 pg    MCHC 32.8 32.0 - 36.0 g/dL    RDW 13.0 11.0 - 14.5 %    Platelets 357 140 - 440 thou/uL    MPV 7.7 7.0 - 10.0 fL   Comprehensive Metabolic Panel   Result Value Ref Range    Sodium 141 136 - 145 mmol/L    Potassium 3.9 3.5 - 5.0 mmol/L    Chloride 104 98 - 107 mmol/L    CO2 29 22 - 31 mmol/L    Anion Gap, Calculation 8 5 - 18 mmol/L    Glucose 94 70 - 125 mg/dL    BUN 23 8 - 28 mg/dL    Creatinine 0.88 0.60 - 1.10 mg/dL    GFR MDRD Af Amer >60 >60 mL/min/1.73m2    GFR MDRD Non Af Amer >60 >60 mL/min/1.73m2    Bilirubin, Total 0.7 0.0 - 1.0 mg/dL    Calcium 10.3 8.5 - 10.5 mg/dL    Protein, Total 7.6 6.0 - 8.0 g/dL    Albumin 4.1 3.5 - 5.0 g/dL    Alkaline Phosphatase 98 45 - 120 U/L    AST 18 0 - 40 U/L    ALT 18 0 - 45 U/L    Narrative    Fasting Glucose reference range is 70-99 mg/dL per  American Diabetes Association (ADA) guidelines.   Lipid Cascade   Result Value Ref Range    Cholesterol 217 (H) <=199 mg/dL    Triglycerides 67 <=149 mg/dL    HDL Cholesterol 74 >=50 mg/dL    LDL Calculated 130 (H) <=129 mg/dL    Patient Fasting > 8hrs? Unknown        As noted all labs look excellent    Please call with questions or contact us using MyChart.    Sincerely,        Electronically signed by Joesph Lange MD

## 2021-06-19 NOTE — LETTER
Letter by Joesph Lange MD at      Author: Joesph Lange MD Service: -- Author Type: --    Filed:  Encounter Date: 9/24/2019 Status: Signed         Munira Rodriguez  [address] [place] [address]             September 24, 2019         Dear Ms. Michael,    Below are the results from your recent visit:    Resulted Orders   CT Abdomen Pelvis With Oral With IV Contrast    Narrative    EXAM: CT ABDOMEN PELVIS W ORAL W IV CONTRAST  LOCATION: [facility]  DATE/TIME: 9/23/2019 12:42 PM    INDICATION: Indication Not Listed - See Reason for Exam Right upper quadrant pain and recent abnormal weight gain  COMPARISON: None.  TECHNIQUE: Helical enhanced thin-section CT scan of the abdomen and pelvis was performed following injection of IV contrast. Multiplanar reformats were obtained. Dose reduction techniques were used.  CONTRAST: Iohexol (Omni) 100 mL    FINDINGS:   LUNG BASES: Linear discoid atelectasis.    ABDOMEN: Liver, gallbladder, bile ducts, pancreas, spleen, adrenals and kidneys are negative.    PELVIS: No adnexal lesions or free fluid. Bowel unremarkable, nothing obstructive or inflammatory. Bladder negative.    MUSCULOSKELETAL: Prior right hip arthroplasty. Mild degenerative changes of spine.      Impression    CONCLUSION:   1.  No etiology for symptoms evident. Nothing obstructive or inflammatory. No mass lesion.         Here is a copy for your records of the your CT scan    Please call with questions or contact us using [employer].    Sincerely,        Electronically signed by Joesph Lange MD

## 2021-06-20 NOTE — LETTER
Letter by Joesph Lange MD at      Author: Joesph Lange MD Service: -- Author Type: --    Filed:  Encounter Date: 12/24/2019 Status: Signed         Munira Rodriguez  [address] [place] [address]             December 24, 2019         Dear Ms. Rodriguez,    Below are the results from your recent visit:    Resulted Orders   Basic Metabolic Panel   Result Value Ref Range    Sodium 143 136 - 145 mmol/L    Potassium 4.1 3.5 - 5.0 mmol/L    Chloride 105 98 - 107 mmol/L    CO2 30 22 - 31 mmol/L    Anion Gap, Calculation 8 5 - 18 mmol/L    Glucose 111 70 - 125 mg/dL    Calcium 9.9 8.5 - 10.5 mg/dL    BUN 24 8 - 28 mg/dL    Creatinine 0.82 0.60 - 1.10 mg/dL    GFR MDRD Af Amer >60 >60 mL/min/1.73m2    GFR MDRD Non Af Amer >60 >60 mL/min/1.73m2    Narrative    Fasting Glucose reference range is 70-99 mg/dL per  American Diabetes Association (ADA) guidelines.   HM2(CBC w/o Differential)   Result Value Ref Range    WBC 8.6 4.0 - 11.0 thou/uL    RBC 4.22 3.80 - 5.40 mill/uL    Hemoglobin 12.6 12.0 - 16.0 g/dL    Hematocrit 37.9 35.0 - 47.0 %    MCV 90 80 - 100 fL    MCH 29.9 27.0 - 34.0 pg    MCHC 33.3 32.0 - 36.0 g/dL    RDW 14.0 11.0 - 14.5 %    Platelets 353 140 - 440 thou/uL    MPV 6.9 (L) 7.0 - 10.0 fL       Preoperative labs look just fine.  Best of luck to you with upcoming surgery.    Thank you for the opportunity to care for you and to know you.  Lots of great memories of the [facility] that we knew and loved.    Please call with questions or contact us using [employer].    Sincerely,        Electronically signed by Joesph Lange MD

## 2021-06-22 ENCOUNTER — RECORDS - HEALTHEAST (OUTPATIENT)
Dept: INTERNAL MEDICINE | Facility: CLINIC | Age: 71
End: 2021-06-22

## 2021-06-22 DIAGNOSIS — M15.0 PRIMARY OSTEOARTHRITIS INVOLVING MULTIPLE JOINTS: ICD-10-CM

## 2021-06-23 RX ORDER — SULINDAC 200 MG/1
200 TABLET ORAL 2 TIMES DAILY
Qty: 60 TABLET | Refills: 12 | Status: SHIPPED | OUTPATIENT
Start: 2021-06-23 | End: 2022-01-21

## 2021-06-25 NOTE — PROGRESS NOTES
"  Office Visit - Follow Up   Munira Rodriguez   70 y.o. female    Date of Visit: 6/1/2021         Assessment and Plan   1. Mass of neck  Appearance ballotable nature and the lack of change in size.  Typical of a subcutaneous lipoma.  Ultrasound did not corroborate that at any rate it persists and she is a bit concerned will refer to ENT to see if it is something that can be excised.  - Ambulatory referral to ENT          Return for Next scheduled follow up.     History of Present Illness   This 70 y.o. old   Chief Complaint   Patient presents with     Follow-up     neck lump    She has not noticed any change in size or tenderness  Review of Systems: A comprehensive review of systems was negative except as noted.     Medications, Allergies and Problem List   Reviewed, reconciled and updated  Patient Active Problem List   Diagnosis     Chest Pain     Essential Hypertension     Chronic renal insufficiency, stage 1     Left knee pain     Rectocele     Bilateral shoulder pain     Leg edema     Agatston coronary artery calcium score between 200 and 399     Incidental lung nodule, less than or equal to 3mm     Obesity (BMI 35.0-39.9) with comorbidity (H)        Physical Exam   General Appearance:       /80   Pulse 71   Ht 5' 5.25\" (1.657 m)   Wt 216 lb (98 kg)   SpO2 96%   BMI 35.67 kg/m      General appearance - alert, well appearing, and in no distress  Mental status - alert, oriented to person, place, and time  Neck - supple, no significant adenopathy  Pea-sized subcutaneous nodule in the left upper neck not seen on the right side no other lymphadenopathy or thyromegaly.  It is not fixed to the overlying skin and it is not tender                                                                                       Additional Information   Current Outpatient Medications   Medication Sig Dispense Refill     aspirin 81 MG EC tablet Take 1 tablet (81 mg total) by mouth daily. 90 tablet 3     atorvastatin " "(LIPITOR) 20 MG tablet Take 1 tablet (20 mg total) by mouth daily. 90 tablet 11     calcium carbonate-vitamin D3 (CALCIUM 600 + D,3,) 600 mg calcium- 200 unit cap Take 1 tablet by mouth 2 (two) times a day.        diphenhydrAMINE (BENADRYL) 25 mg capsule Take 25 mg by mouth at bedtime.       DOCOSAHEXANOIC ACID/EPA (FISH OIL ORAL) Take 1,000 mg by mouth 2 (two) times a day.        furosemide (LASIX) 20 MG tablet Take 1 tablet (20 mg total) by mouth daily. 90 tablet 11     losartan (COZAAR) 100 MG tablet Take 1 tablet (100 mg total) by mouth daily. 90 tablet 3     melatonin 10 mg Tab Take 1 tablet by mouth at bedtime.       multivitamin therapeutic (THERAGRAN) tablet Take 1 tablet by mouth daily.       omeprazole (PRILOSEC) 20 MG capsule TAKE ONE CAPSULE ORALLY DAILY 90 capsule 3     senna (SENOKOT) 8.6 mg tablet Take 1 tablet by mouth daily.       sulindac (CLINORIL) 200 MG tablet TAKE 1 TABLET (200 MG TOTAL) BY MOUTH 2 (TWO) TIMES A DAY. 60 tablet 0     No current facility-administered medications for this visit.      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 She has never used smokeless tobacco. She reports current alcohol use. She reports that she does not use drugs.   Past Medical History:   Diagnosis Date     Acid reflux      Arthritis      History of blood clots 1980    Spontaneous \"thrombophlebitis\" treated with Coumadin for 3 months     History of pulmonary embolus (PE) 1972    [facility]     Hypertension      Psoriasis      Rectocele      Thrombophlebitis 1980           Time:      Marcela Amato MD    "

## 2021-06-26 NOTE — TELEPHONE ENCOUNTER
RN cannot approve Refill Request    RN can NOT refill this medication med is not covered by policy/route to provider. Last office visit: 6/1/2021 Marcela Amato MD Last Physical: 12/18/2020 Last MTM visit: Visit date not found Last visit same specialty: 6/1/2021 Marcela Amato MD.  Next visit within 3 mo: Visit date not found  Next physical within 3 mo: Visit date not found      Pat Bedoya, [facility] Triage/Med Refill 6/22/2021    Requested Prescriptions   Pending Prescriptions Disp Refills     sulindac (CLINORIL) 200 MG tablet [Pharmacy Med Name: SULINDAC 200MG] 60 tablet 0     Sig: TAKE 1 TABLET (200 MG TOTAL) BY MOUTH 2 (TWO) TIMES A DAY.       There is no refill protocol information for this order

## 2021-07-06 VITALS
DIASTOLIC BLOOD PRESSURE: 80 MMHG | HEART RATE: 71 BPM | BODY MASS INDEX: 35.99 KG/M2 | HEIGHT: 65 IN | SYSTOLIC BLOOD PRESSURE: 142 MMHG | OXYGEN SATURATION: 96 % | WEIGHT: 216 LBS

## 2021-07-13 ENCOUNTER — RECORDS - HEALTHEAST (OUTPATIENT)
Dept: ADMINISTRATIVE | Facility: CLINIC | Age: 71
End: 2021-07-13

## 2021-07-21 ENCOUNTER — RECORDS - HEALTHEAST (OUTPATIENT)
Dept: ADMINISTRATIVE | Facility: CLINIC | Age: 71
End: 2021-07-21

## 2021-09-29 ENCOUNTER — TRANSFERRED RECORDS (OUTPATIENT)
Dept: HEALTH INFORMATION MANAGEMENT | Facility: CLINIC | Age: 71
End: 2021-09-29

## 2021-10-11 ENCOUNTER — HEALTH MAINTENANCE LETTER (OUTPATIENT)
Age: 71
End: 2021-10-11

## 2021-11-29 DIAGNOSIS — R07.89 CHEST PRESSURE: ICD-10-CM

## 2021-11-30 NOTE — TELEPHONE ENCOUNTER
"Routing refill request to provider for review/approval because:  Blood pressure    Last Written Prescription Date:  11/17/2020  Last Fill Quantity: 90,  # refills: 3   Last office visit provider:  6/1/2021     Requested Prescriptions   Pending Prescriptions Disp Refills     losartan (COZAAR) 100 MG tablet [Pharmacy Med Name: LOSARTAN POT 100MG] 90 tablet 2     Sig: TAKE 1 TABLET (100 MG TOTAL) BY MOUTH DAILY.       Angiotensin-II Receptors Failed - 11/29/2021 10:09 AM        Failed - Last blood pressure under 140/90 in past 12 months     BP Readings from Last 3 Encounters:   06/01/21 (!) 142/80   02/24/21 122/74   12/18/20 128/70                 Passed - Recent (12 mo) or future (30 days) visit within the authorizing provider's specialty     Patient has had an office visit with the authorizing provider or a provider within the authorizing providers department within the previous 12 mos or has a future within next 30 days. See \"Patient Info\" tab in inbasket, or \"Choose Columns\" in Meds & Orders section of the refill encounter.              Passed - Medication is active on med list        Passed - Patient is age 18 or older        Passed - No active pregnancy on record        Passed - Normal serum creatinine on file in past 12 months     Recent Labs   Lab Test 12/18/20  1027   CR 0.79       Ok to refill medication if creatinine is low          Passed - Normal serum potassium on file in past 12 months     Recent Labs   Lab Test 12/18/20  1027   POTASSIUM 4.0                    Passed - No positive pregnancy test in past 12 months             Beth Boyce RN 11/30/21 4:03 PM  "

## 2021-12-01 RX ORDER — LOSARTAN POTASSIUM 100 MG/1
TABLET ORAL
Qty: 90 TABLET | Refills: 2 | Status: SHIPPED | OUTPATIENT
Start: 2021-12-01 | End: 2022-08-29

## 2021-12-22 DIAGNOSIS — R10.13 DYSPEPSIA: ICD-10-CM

## 2022-01-06 ENCOUNTER — HOSPITAL ENCOUNTER (OUTPATIENT)
Dept: MAMMOGRAPHY | Facility: CLINIC | Age: 72
Discharge: HOME | End: 2022-01-06
Attending: INTERNAL MEDICINE | Admitting: INTERNAL MEDICINE
Payer: MEDICARE

## 2022-01-06 DIAGNOSIS — Z12.31 VISIT FOR SCREENING MAMMOGRAM: ICD-10-CM

## 2022-01-06 PROCEDURE — 77067 SCR MAMMO BI INCL CAD: CPT

## 2022-01-21 ENCOUNTER — OFFICE VISIT (OUTPATIENT)
Dept: INTERNAL MEDICINE | Facility: CLINIC | Age: 72
End: 2022-01-21
Payer: COMMERCIAL

## 2022-01-21 VITALS
SYSTOLIC BLOOD PRESSURE: 122 MMHG | OXYGEN SATURATION: 97 % | DIASTOLIC BLOOD PRESSURE: 64 MMHG | TEMPERATURE: 97.9 F | WEIGHT: 216 LBS | HEIGHT: 65 IN | BODY MASS INDEX: 35.99 KG/M2 | HEART RATE: 72 BPM

## 2022-01-21 DIAGNOSIS — E78.2 MIXED HYPERLIPIDEMIA: Primary | ICD-10-CM

## 2022-01-21 DIAGNOSIS — Z12.11 ENCOUNTER FOR SCREENING FOR MALIGNANT NEOPLASM OF COLON: ICD-10-CM

## 2022-01-21 DIAGNOSIS — M25.512 CHRONIC PAIN OF BOTH SHOULDERS: ICD-10-CM

## 2022-01-21 DIAGNOSIS — N81.6 RECTOCELE: ICD-10-CM

## 2022-01-21 DIAGNOSIS — M25.511 CHRONIC PAIN OF BOTH SHOULDERS: ICD-10-CM

## 2022-01-21 DIAGNOSIS — M15.0 PRIMARY OSTEOARTHRITIS INVOLVING MULTIPLE JOINTS: ICD-10-CM

## 2022-01-21 DIAGNOSIS — Z11.59 NEED FOR HEPATITIS C SCREENING TEST: ICD-10-CM

## 2022-01-21 DIAGNOSIS — G89.29 CHRONIC PAIN OF BOTH SHOULDERS: ICD-10-CM

## 2022-01-21 DIAGNOSIS — I10 ESSENTIAL HYPERTENSION: ICD-10-CM

## 2022-01-21 DIAGNOSIS — E66.01 MORBID OBESITY (H): ICD-10-CM

## 2022-01-21 DIAGNOSIS — R73.03 PREDIABETES: ICD-10-CM

## 2022-01-21 DIAGNOSIS — Z00.00 HEALTHCARE MAINTENANCE: ICD-10-CM

## 2022-01-21 LAB
ALBUMIN SERPL-MCNC: 3.8 G/DL (ref 3.5–5)
ALP SERPL-CCNC: 81 U/L (ref 45–120)
ALT SERPL W P-5'-P-CCNC: 19 U/L (ref 0–45)
ANION GAP SERPL CALCULATED.3IONS-SCNC: 11 MMOL/L (ref 5–18)
AST SERPL W P-5'-P-CCNC: 22 U/L (ref 0–40)
BILIRUB SERPL-MCNC: 0.4 MG/DL (ref 0–1)
BUN SERPL-MCNC: 19 MG/DL (ref 8–28)
CALCIUM SERPL-MCNC: 9.8 MG/DL (ref 8.5–10.5)
CHLORIDE BLD-SCNC: 103 MMOL/L (ref 98–107)
CHOLEST SERPL-MCNC: 147 MG/DL
CO2 SERPL-SCNC: 27 MMOL/L (ref 22–31)
CREAT SERPL-MCNC: 0.76 MG/DL (ref 0.6–1.1)
ERYTHROCYTE [DISTWIDTH] IN BLOOD BY AUTOMATED COUNT: 14.4 % (ref 10–15)
FASTING STATUS PATIENT QL REPORTED: YES
GFR SERPL CREATININE-BSD FRML MDRD: 83 ML/MIN/1.73M2
GLUCOSE BLD-MCNC: 90 MG/DL (ref 70–125)
HBA1C MFR BLD: 5.6 % (ref 0–5.6)
HCT VFR BLD AUTO: 37.9 % (ref 35–47)
HCV AB SERPL QL IA: NONREACTIVE
HDLC SERPL-MCNC: 70 MG/DL
HGB BLD-MCNC: 11.3 G/DL (ref 11.7–15.7)
LDLC SERPL CALC-MCNC: 69 MG/DL
MCH RBC QN AUTO: 26.7 PG (ref 26.5–33)
MCHC RBC AUTO-ENTMCNC: 29.8 G/DL (ref 31.5–36.5)
MCV RBC AUTO: 90 FL (ref 78–100)
PLATELET # BLD AUTO: 301 10E3/UL (ref 150–450)
POTASSIUM BLD-SCNC: 3.8 MMOL/L (ref 3.5–5)
PROT SERPL-MCNC: 7.3 G/DL (ref 6–8)
RBC # BLD AUTO: 4.23 10E6/UL (ref 3.8–5.2)
SODIUM SERPL-SCNC: 141 MMOL/L (ref 136–145)
TRIGL SERPL-MCNC: 38 MG/DL
TSH SERPL DL<=0.005 MIU/L-ACNC: 1.94 UIU/ML (ref 0.3–5)
WBC # BLD AUTO: 5 10E3/UL (ref 4–11)

## 2022-01-21 PROCEDURE — 36415 COLL VENOUS BLD VENIPUNCTURE: CPT | Performed by: INTERNAL MEDICINE

## 2022-01-21 PROCEDURE — 80053 COMPREHEN METABOLIC PANEL: CPT | Performed by: INTERNAL MEDICINE

## 2022-01-21 PROCEDURE — 85027 COMPLETE CBC AUTOMATED: CPT | Performed by: INTERNAL MEDICINE

## 2022-01-21 PROCEDURE — 83036 HEMOGLOBIN GLYCOSYLATED A1C: CPT | Performed by: INTERNAL MEDICINE

## 2022-01-21 PROCEDURE — G0439 PPPS, SUBSEQ VISIT: HCPCS | Performed by: INTERNAL MEDICINE

## 2022-01-21 PROCEDURE — 86803 HEPATITIS C AB TEST: CPT | Performed by: INTERNAL MEDICINE

## 2022-01-21 PROCEDURE — 80061 LIPID PANEL: CPT | Performed by: INTERNAL MEDICINE

## 2022-01-21 PROCEDURE — 84443 ASSAY THYROID STIM HORMONE: CPT | Performed by: INTERNAL MEDICINE

## 2022-01-21 RX ORDER — SULINDAC 200 MG/1
200 TABLET ORAL 2 TIMES DAILY
Qty: 60 TABLET | Refills: 12 | Status: SHIPPED | OUTPATIENT
Start: 2022-01-21 | End: 2023-01-27

## 2022-01-21 RX ORDER — UBIDECARENONE 60 MG
1 CAPSULE ORAL DAILY
COMMUNITY
Start: 2021-03-01

## 2022-01-21 ASSESSMENT — ACTIVITIES OF DAILY LIVING (ADL): CURRENT_FUNCTION: NO ASSISTANCE NEEDED

## 2022-01-21 ASSESSMENT — MIFFLIN-ST. JEOR: SCORE: 1494.61

## 2022-01-21 NOTE — PROGRESS NOTES
"SUBJECTIVE:   Munira Rodriguez is a 71 year old female who presents for Preventive Visit.  Ran out of clinoril and wanted to try being off it but her shoulder and back pain relapses . Nose gets cold.   {SPatient has been advised of split billing requirements and indicates understanding: Yes  Are you in the first 12 months of your Medicare coverage?  No    Healthy Habits:     In general, how would you rate your overall health?  Good    Frequency of exercise:  2-3 days/week    Duration of exercise:  Less than 15 minutes    Do you usually eat at least 4 servings of fruit and vegetables a day, include whole grains    & fiber and avoid regularly eating high fat or \"junk\" foods?  Yes    Taking medications regularly:  Yes    Ability to successfully perform activities of daily living:  No assistance needed    Home Safety:  No safety concerns identified    Hearing Impairment:  No hearing concerns    In the past 6 months, have you been bothered by leaking of urine?  No    In general, how would you rate your overall mental or emotional health?  Good      PHQ-2 Total Score: 1    Additional concerns today:  No    Do you feel safe in your environment? Yes    Have you ever done Advance Care Planning? (For example, a Health Directive, POLST, or a discussion with a medical provider or your loved ones about your wishes): Yes, patient states has an Advance Care Planning document and will bring a copy to the clinic.       Fall risk  Fallen 2 or more times in the past year?: No  Any fall with injury in the past year?: No    Cognitive Screening   1) Repeat 3 items (Leader, Season, Table)    2) Clock draw: NORMAL  3) 3 item recall: Recalls 3 objects  Results: NORMAL clock, 1-2 items recalled: COGNITIVE IMPAIRMENT LESS LIKELY    Mini-CogTM Copyright KRUPA Moya. Licensed by the author for use in [facility]; reprinted with permission (zhen@.[facility]). All rights reserved.      Do you have sleep apnea, excessive snoring or daytime " "drowsiness?: no    Reviewed and updated as needed this visit by clinical staff  Tobacco  Allergies  Meds             Reviewed and updated as needed this visit by Provider               Social History     Tobacco Use     Smoking status: Never Smoker     Smokeless tobacco: Never Used   Substance Use Topics     Alcohol use: Yes     Comment: Alcoholic Drinks/day: Wine 1 glass daily      If you drink alcohol do you typically have >3 drinks per day or >7 drinks per week? No    Alcohol Use 1/21/2022   Prescreen: >3 drinks/day or >7 drinks/week? No       Current providers sharing in care for this patient include:   Patient Care Team:  Marcela Amato MD as PCP - General (Internal Medicine)  Marcela Amato MD as Assigned PCP    The following health maintenance items are reviewed in Epic and correct as of today:  Health Maintenance Due   Topic Date Due     MICROALBUMIN  Never done     ANNUAL REVIEW OF  ORDERS  Never done     URINALYSIS  Never done     HEPATITIS C SCREENING  Never done     DTAP/TDAP/TD IMMUNIZATION (1 - Tdap) 01/02/1998     MEDICARE ANNUAL WELLNESS VISIT  12/18/2021     BMP  12/18/2021     LIPID  12/18/2021     FALL RISK ASSESSMENT  12/18/2021       Patient Active Problem List   Diagnosis     Chest Pain     Essential Hypertension     Chronic renal insufficiency, stage 1     Left knee pain     Rectocele     Bilateral shoulder pain     Leg edema     Agatston coronary artery calcium score between 200 and 399     Incidental lung nodule, less than or equal to 3mm     Obesity (BMI 35.0-39.9) with comorbidity (H)     Healthcare maintenance             Review of Systems  no shortness of breath ,no  chest pain ,no  Falls, no urinary incontinence , no weight changes , sleep and moodhave been good . Independent in ADLS and IADLS       OBJECTIVE:   /64 (BP Location: Left arm, Patient Position: Sitting, Cuff Size: Adult Small)   Pulse 72   Temp 97.9  F (36.6  C)   Ht 1.657 m (5' 5.25\")   Wt 98 kg (216 lb) " "  SpO2 97%   BMI 35.67 kg/m   Estimated body mass index is 35.67 kg/m  as calculated from the following:    Height as of this encounter: 1.657 m (5' 5.25\").    Weight as of this encounter: 98 kg (216 lb).  Physical Exam  GENERAL APPEARANCE: healthy, alert and no distress  EYES: Eyes grossly normal to inspection, PERRL and conjunctivae and sclerae normal  HENT: ear canals and TM's normal, nose and mouth without ulcers or lesions, oropharynx clear and oral mucous membranes moist  NECK: no adenopathy, no asymmetry, masses, or scars and thyroid normal to palpation  RESP: lungs clear to auscultation - no rales, rhonchi or wheezes  BREAST: normal without masses, tenderness or nipple discharge and no palpable axillary masses or adenopathy  CV: regular rate and rhythm, normal S1 S2, no S3 or S4, no murmur, click or rub, no peripheral edema and peripheral pulses strong  ABDOMEN: soft, nontender, no hepatosplenomegaly, no masses and bowel sounds normal  MS: no musculoskeletal defects are noted and gait is age appropriate without ataxia  SKIN: no suspicious lesions or rashes  NEURO: Normal strength and tone, sensory exam grossly normal, mentation intact and speech normal  PSYCH: mentation appears normal and affect normal/bright    Diagnostic Test Results:  Labs reviewed in Epic    ASSESSMENT / PLAN:   (E78.2) Mixed hyperlipidemia  (primary encounter diagnosis)  Comment: on statin tolerating it well  Plan: Comprehensive metabolic panel, TSH, CBC with         platelets            (Z11.59) Need for hepatitis C screening test  Comment:   Plan: Hepatitis C Screen Reflex to HCV RNA Quant and         Genotype            (M89.49) Primary osteoarthritis involving multiple joints  Comment: We had a discussion members to try to stop taking it and her pain relapse we talked about the risks versus benefit I definitely think she should stop her baby aspirin I told her not to mix it with Aleve or Advil or other NSAIDs. She understands the " "risk I feel the benefit outweighs the risk we will monitor her labs closely she has never had renal insufficiency. Or gastric ulcers she can refill it and consider taking it once daily  Plan: sulindac (CLINORIL) 200 MG tablet            (Z00.00) Healthcare maintenance  Comment:   Plan: Health maintenance reviewed and is up-to-date she is due for colonoscopy this year  She is due for her Tdap  (Z12.11) Encounter for screening for malignant neoplasm of colon  Comment:   Plan: Adult Gastro Ref - Procedure Only            (R73.03) Prediabetes  Comment:   Plan: Hemoglobin A1c            (N81.6) Rectocele  Comment: Is not symptomatic pelvic exam is optional for her   Plan:     (I10) Essential hypertension  Comment: Well-controlled  Plan: She takes Lasix primarily for her leg edema I did tell her its not really recommended that seems to be helping her blood pressure with no consequence on her labs. So she can continue she has chronic venous insufficiency without significant stasis or ulcers or thrombophlebitic changes    (M25.511,  G89.29,  M25.512) Chronic pain of both shoulders  Comment:   Plan:     (E66.01) Obesity (BMI 35.0-39.9) with comorbidity (H)  Comment: She is worried about her weight gain  Plan: We talked about this in detail and gave her more information she definitely loses weight in the summer we can consider Wellbutrin Topamax combination or Contrave if necessary I did tell her most medications are not covered by insurance and there is no compelling reason for her to lose weight as her medical        COUNSELING:  Reviewed preventive health counseling, as reflected in patient instructions    Estimated body mass index is 35.67 kg/m  as calculated from the following:    Height as of this encounter: 1.657 m (5' 5.25\").    Weight as of this encounter: 98 kg (216 lb).    Weight management plan: Discussed healthy diet and exercise guidelines    She reports that she has never smoked. She has never used smokeless " tobacco.      Appropriate preventive services were discussed with this patient, including applicable screening as appropriate for cardiovascular disease, diabetes, osteopenia/osteoporosis, and glaucoma.  As appropriate for age/gender, discussed screening for colorectal cancer, prostate cancer, breast cancer, and cervical cancer. Checklist reviewing preventive services available has been given to the patient.    Reviewed patients plan of care and provided an AVS. The Basic Care Plan (routine screening as documented in Health Maintenance) for Munira meets the Care Plan requirement. This Care Plan has been established and reviewed with the Patient.    Counseling Resources:  ATP IV Guidelines  Pooled Cohorts Equation Calculator  Breast Cancer Risk Calculator  Breast Cancer: Medication to Reduce Risk  FRAX Risk Assessment  ICSI Preventive Guidelines  Dietary Guidelines for Americans, 2010  HQ plus's MyPlate  ASA Prophylaxis  Lung CA Screening    Marcela Amato MD  [facility]    Identified Health Risks:

## 2022-01-27 ENCOUNTER — TRANSFERRED RECORDS (OUTPATIENT)
Dept: HEALTH INFORMATION MANAGEMENT | Facility: CLINIC | Age: 72
End: 2022-01-27
Payer: COMMERCIAL

## 2022-02-21 DIAGNOSIS — E78.2 MIXED HYPERLIPIDEMIA: ICD-10-CM

## 2022-02-22 RX ORDER — FUROSEMIDE 20 MG
TABLET ORAL
Qty: 90 TABLET | Refills: 3 | Status: SHIPPED | OUTPATIENT
Start: 2022-02-22 | End: 2023-01-27

## 2022-02-22 NOTE — TELEPHONE ENCOUNTER
"Last Written Prescription Date:  12/18/20  Last Fill Quantity: 90,  # refills: 11   Last office visit provider:  1/21/22     Requested Prescriptions   Pending Prescriptions Disp Refills     furosemide (LASIX) 20 MG tablet [Pharmacy Med Name: FUROSEMIDE 20MG] 90 tablet 10     Sig: TAKE 1 TABLET (20 MG TOTAL) BY MOUTH DAILY.       Diuretics (Including Combos) Protocol Passed - 2/22/2022 11:22 AM        Passed - Blood pressure under 140/90 in past 12 months     BP Readings from Last 3 Encounters:   01/21/22 122/64   06/01/21 (!) 142/80   02/24/21 122/74                 Passed - Recent (12 mo) or future (30 days) visit within the authorizing provider's specialty     Patient has had an office visit with the authorizing provider or a provider within the authorizing providers department within the previous 12 mos or has a future within next 30 days. See \"Patient Info\" tab in inbasket, or \"Choose Columns\" in Meds & Orders section of the refill encounter.              Passed - Medication is active on med list        Passed - Patient is age 18 or older        Passed - No active pregancy on record        Passed - Normal serum creatinine on file in past 12 months     Recent Labs   Lab Test 01/21/22  0940   CR 0.76              Passed - Normal serum potassium on file in past 12 months     Recent Labs   Lab Test 01/21/22  0940   POTASSIUM 3.8                    Passed - Normal serum sodium on file in past 12 months     Recent Labs   Lab Test 01/21/22  0940                 Passed - No positive pregnancy test in past 12 months             Ismael Cook RN 02/22/22 11:22 AM  "

## 2022-03-18 DIAGNOSIS — R10.13 DYSPEPSIA: ICD-10-CM

## 2022-03-18 NOTE — TELEPHONE ENCOUNTER
"Last Written Prescription Date:  12/13/21  Last Fill Quantity: 90,  # refills: 0   Last office visit provider:  1/21/22    Requested Prescriptions   Pending Prescriptions Disp Refills     omeprazole (PRILOSEC) 20 MG DR capsule [Pharmacy Med Name: OMEPRAZOLE 20MG] 90 capsule 0     Sig: TAKE ONE CAPSULE ORALLY DAILY       PPI Protocol Passed - 3/18/2022 10:48 AM        Passed - Not on Clopidogrel (unless Pantoprazole ordered)        Passed - No diagnosis of osteoporosis on record        Passed - Recent (12 mo) or future (30 days) visit within the authorizing provider's specialty     Patient has had an office visit with the authorizing provider or a provider within the authorizing providers department within the previous 12 mos or has a future within next 30 days. See \"Patient Info\" tab in inbasket, or \"Choose Columns\" in Meds & Orders section of the refill encounter.              Passed - Medication is active on med list        Passed - Patient is age 18 or older        Passed - No active pregnacy on record        Passed - No positive pregnancy test in past 12 months             Ligia Ba RN 03/18/22 3:43 PM  "

## 2022-04-12 DIAGNOSIS — I25.10 ASCVD (ARTERIOSCLEROTIC CARDIOVASCULAR DISEASE): ICD-10-CM

## 2022-04-15 RX ORDER — ATORVASTATIN CALCIUM 20 MG/1
TABLET, FILM COATED ORAL
Qty: 90 TABLET | Refills: 3 | Status: SHIPPED | OUTPATIENT
Start: 2022-04-15 | End: 2023-04-04

## 2022-04-15 NOTE — TELEPHONE ENCOUNTER
"Last Written Prescription Date:  1/18/21  Last Fill Quantity: 90,  # refills: 11   Last office visit provider:  1/21/22     Requested Prescriptions   Pending Prescriptions Disp Refills     atorvastatin (LIPITOR) 20 MG tablet [Pharmacy Med Name: ATORVASTATIN 20MG] 90 tablet 10     Sig: TAKE 1 TABLET (20 MG TOTAL) BY MOUTH DAILY.       Statins Protocol Passed - 4/15/2022  1:15 PM        Passed - LDL on file in past 12 months     Recent Labs   Lab Test 01/21/22  0940   LDL 69             Passed - No abnormal creatine kinase in past 12 months     No lab results found.             Passed - Recent (12 mo) or future (30 days) visit within the authorizing provider's specialty     Patient has had an office visit with the authorizing provider or a provider within the authorizing providers department within the previous 12 mos or has a future within next 30 days. See \"Patient Info\" tab in inbasket, or \"Choose Columns\" in Meds & Orders section of the refill encounter.              Passed - Medication is active on med list        Passed - Patient is age 18 or older        Passed - No active pregnancy on record        Passed - No positive pregnancy test in past 12 months             Ismael Cook RN 04/15/22 1:15 PM  "

## 2022-06-22 ENCOUNTER — TRANSFERRED RECORDS (OUTPATIENT)
Dept: HEALTH INFORMATION MANAGEMENT | Facility: CLINIC | Age: 72
End: 2022-06-22

## 2022-08-29 DIAGNOSIS — R07.89 CHEST PRESSURE: ICD-10-CM

## 2022-08-29 RX ORDER — LOSARTAN POTASSIUM 100 MG/1
TABLET ORAL
Qty: 90 TABLET | Refills: 1 | Status: SHIPPED | OUTPATIENT
Start: 2022-08-29 | End: 2023-02-26

## 2022-08-29 NOTE — TELEPHONE ENCOUNTER
"Last Written Prescription Date:  12/1/21  Last Fill Quantity: 90,  # refills: 2   Last office visit provider:  1/21/22     Requested Prescriptions   Pending Prescriptions Disp Refills     losartan (COZAAR) 100 MG tablet [Pharmacy Med Name: LOSARTAN POT 100MG] 90 tablet 1     Sig: TAKE 1 TABLET (100 MG TOTAL) BY MOUTH DAILY.       Angiotensin-II Receptors Passed - 8/29/2022  2:33 PM        Passed - Last blood pressure under 140/90 in past 12 months     BP Readings from Last 3 Encounters:   01/21/22 122/64   06/01/21 (!) 142/80   02/24/21 122/74                 Passed - Recent (12 mo) or future (30 days) visit within the authorizing provider's specialty     Patient has had an office visit with the authorizing provider or a provider within the authorizing providers department within the previous 12 mos or has a future within next 30 days. See \"Patient Info\" tab in inbasket, or \"Choose Columns\" in Meds & Orders section of the refill encounter.              Passed - Medication is active on med list        Passed - Patient is age 18 or older        Passed - No active pregnancy on record        Passed - Normal serum creatinine on file in past 12 months     Recent Labs   Lab Test 01/21/22  0940   CR 0.76       Ok to refill medication if creatinine is low          Passed - Normal serum potassium on file in past 12 months     Recent Labs   Lab Test 01/21/22  0940   POTASSIUM 3.8                    Passed - No positive pregnancy test in past 12 months             Ismael Cook RN 08/29/22 2:34 PM  "

## 2022-09-19 ENCOUNTER — TRANSFERRED RECORDS (OUTPATIENT)
Dept: HEALTH INFORMATION MANAGEMENT | Facility: CLINIC | Age: 72
End: 2022-09-19

## 2022-09-24 ENCOUNTER — HEALTH MAINTENANCE LETTER (OUTPATIENT)
Age: 72
End: 2022-09-24

## 2022-11-23 ENCOUNTER — TRANSFERRED RECORDS (OUTPATIENT)
Dept: HEALTH INFORMATION MANAGEMENT | Facility: CLINIC | Age: 72
End: 2022-11-23

## 2022-12-14 ENCOUNTER — OFFICE VISIT (OUTPATIENT)
Dept: INTERNAL MEDICINE | Facility: CLINIC | Age: 72
End: 2022-12-14
Payer: COMMERCIAL

## 2022-12-14 VITALS
BODY MASS INDEX: 36.33 KG/M2 | SYSTOLIC BLOOD PRESSURE: 136 MMHG | DIASTOLIC BLOOD PRESSURE: 82 MMHG | HEART RATE: 71 BPM | TEMPERATURE: 98 F | WEIGHT: 220 LBS | OXYGEN SATURATION: 98 %

## 2022-12-14 DIAGNOSIS — K21.00 GASTROESOPHAGEAL REFLUX DISEASE WITH ESOPHAGITIS WITHOUT HEMORRHAGE: ICD-10-CM

## 2022-12-14 DIAGNOSIS — H00.012 HORDEOLUM EXTERNUM OF RIGHT LOWER EYELID: ICD-10-CM

## 2022-12-14 DIAGNOSIS — Z01.818 PRE-OP EXAM: ICD-10-CM

## 2022-12-14 DIAGNOSIS — N18.1 CHRONIC RENAL INSUFFICIENCY, STAGE 1: Primary | ICD-10-CM

## 2022-12-14 LAB
ANION GAP SERPL CALCULATED.3IONS-SCNC: 7 MMOL/L (ref 7–15)
ATRIAL RATE - MUSE: 62 BPM
BUN SERPL-MCNC: 21.3 MG/DL (ref 8–23)
CALCIUM SERPL-MCNC: 9 MG/DL (ref 8.8–10.2)
CHLORIDE SERPL-SCNC: 107 MMOL/L (ref 98–107)
CREAT SERPL-MCNC: 0.75 MG/DL (ref 0.51–0.95)
DEPRECATED HCO3 PLAS-SCNC: 29 MMOL/L (ref 22–29)
DIASTOLIC BLOOD PRESSURE - MUSE: NORMAL MMHG
ERYTHROCYTE [DISTWIDTH] IN BLOOD BY AUTOMATED COUNT: 16.8 % (ref 10–15)
GFR SERPL CREATININE-BSD FRML MDRD: 84 ML/MIN/1.73M2
GLUCOSE SERPL-MCNC: 99 MG/DL (ref 70–99)
HCT VFR BLD AUTO: 36.7 % (ref 35–47)
HGB BLD-MCNC: 11.6 G/DL (ref 11.7–15.7)
INTERPRETATION ECG - MUSE: NORMAL
MCH RBC QN AUTO: 30.1 PG (ref 26.5–33)
MCHC RBC AUTO-ENTMCNC: 31.6 G/DL (ref 31.5–36.5)
MCV RBC AUTO: 95 FL (ref 78–100)
P AXIS - MUSE: 54 DEGREES
PLATELET # BLD AUTO: 238 10E3/UL (ref 150–450)
POTASSIUM SERPL-SCNC: 4.3 MMOL/L (ref 3.4–5.3)
PR INTERVAL - MUSE: 156 MS
QRS DURATION - MUSE: 84 MS
QT - MUSE: 396 MS
QTC - MUSE: 401 MS
R AXIS - MUSE: 0 DEGREES
RBC # BLD AUTO: 3.85 10E6/UL (ref 3.8–5.2)
SODIUM SERPL-SCNC: 143 MMOL/L (ref 136–145)
SYSTOLIC BLOOD PRESSURE - MUSE: NORMAL MMHG
T AXIS - MUSE: 39 DEGREES
VENTRICULAR RATE- MUSE: 62 BPM
WBC # BLD AUTO: 5.7 10E3/UL (ref 4–11)

## 2022-12-14 PROCEDURE — 36415 COLL VENOUS BLD VENIPUNCTURE: CPT | Performed by: INTERNAL MEDICINE

## 2022-12-14 PROCEDURE — 93005 ELECTROCARDIOGRAM TRACING: CPT | Performed by: INTERNAL MEDICINE

## 2022-12-14 PROCEDURE — 99214 OFFICE O/P EST MOD 30 MIN: CPT | Performed by: INTERNAL MEDICINE

## 2022-12-14 PROCEDURE — 85027 COMPLETE CBC AUTOMATED: CPT | Performed by: INTERNAL MEDICINE

## 2022-12-14 PROCEDURE — 80048 BASIC METABOLIC PNL TOTAL CA: CPT | Performed by: INTERNAL MEDICINE

## 2022-12-14 PROCEDURE — 93010 ELECTROCARDIOGRAM REPORT: CPT | Performed by: INTERNAL MEDICINE

## 2022-12-14 RX ORDER — ERYTHROMYCIN 5 MG/G
0.5 OINTMENT OPHTHALMIC 4 TIMES DAILY
Qty: 3.5 G | Refills: 0 | Status: SHIPPED | OUTPATIENT
Start: 2022-12-14 | End: 2022-12-24

## 2022-12-14 RX ORDER — FAMOTIDINE 20 MG/1
20 TABLET, FILM COATED ORAL 2 TIMES DAILY
Qty: 34 TABLET | Refills: 1 | Status: SHIPPED | OUTPATIENT
Start: 2022-12-14 | End: 2023-04-05

## 2022-12-14 NOTE — PATIENT INSTRUCTIONS
Nothing by mouth on morning of surger    Stop sulindac , any NSAIDS   Take morning meds after surgery is done   Take a baby apsirin post operatively once daily for seven days

## 2022-12-14 NOTE — PROGRESS NOTES
[facility]  [address] [place] [address]  Phone: [phone]  Fax: [phone]  Primary Provider: Eliu Meier  Pre-op Performing Provider: ELIU MEIER      PREOPERATIVE EVALUATION:  Today's date: 12/14/2022    Munira Rodriguez is a 72 year old female who presents for a preoperative evaluation.    Surgical Information:  Surgery/Procedure: Left Shoulder replacement  Surgery Location:  [facility]   Surgeon: Dr. Darion Mishra  Surgery Date: 1/11/22  Time of Surgery: TBD  Where patient plans to recover: At home with family  Fax number for surgical facility: Note does not need to be faxed, will be available electronically in Epic.    Type of Anesthesia Anticipated: GA     Assessment & Plan     The proposed surgical procedure is considered LOW risk.    Chronic renal insufficiency, stage 1      Pre-op exam  Recommend she take baby aspirin after surgery she had DVT in 1978 she did not have any complications post hip or knee replacement but she was given low-dose molecular heparin and aspirin postoperatively.  - EKG 12-lead, tracing only  - Asymptomatic COVID-19 Virus (Coronavirus) by PCR Nasopharyngeal  - CBC with platelets  - Basic metabolic panel  (Ca, Cl, CO2, Creat, Gluc, K, Na, BUN)  - CBC with platelets  - Basic metabolic panel  (Ca, Cl, CO2, Creat, Gluc, K, Na, BUN)    Hordeolum externum of right lower eyelid  Recommend conservative care for the stye she is already getting better if she does notice any purulent discharge and she can take erythromycin.  - erythromycin (ROMYCIN) 5 MG/GM ophthalmic ointment  Dispense: 3.5 g; Refill: 0    Gastroesophageal reflux disease with esophagitis without hemorrhage  For the intermittent heartburn that she is experiencing recommend Pepcid if it does not improve she can let us know.  - famotidine (PEPCID) 20 MG tablet  Dispense: 34 tablet; Refill: 1  She will do COVID test 3 days before  surgery               Medication Instructions:  Patient is to hold her morning medications and take them later on she is to hold her sulindac.    RECOMMENDATION:  APPROVAL GIVEN to proceed with proposed procedure, without further diagnostic evaluation.          \  Subjective     HPI related to upcoming procedure: had covid in October , terrible sore throat wp,en    Preop Questions 12/12/2022   1. Have you ever had a heart attack or stroke? No   2. Have you ever had surgery on your heart or blood vessels, such as a stent placement, a coronary artery bypass, or surgery on an artery in your head, neck, heart, or legs? No   3. Do you have chest pain with activity? No   4. Do you have a history of  heart failure? No   5. Do you currently have a cold, bronchitis or symptoms of other infection? No   6. Do you have a cough, shortness of breath, or wheezing? No   7. Do you or anyone in your family have previous history of blood clots? YES    8. Do you or does anyone in your family have a serious bleeding problem such as prolonged bleeding following surgeries or cuts? No   9. Have you ever had problems with anemia or been told to take iron pills? No   10. Have you had any abnormal blood loss such as black, tarry or bloody stools, or abnormal vaginal bleeding? No   11. Have you ever had a blood transfusion? No   12. Are you willing to have a blood transfusion if it is medically needed before, during, or after your surgery? Yes   13. Have you or any of your relatives ever had problems with anesthesia? No   14. Do you have sleep apnea, excessive snoring or daytime drowsiness? No   15. Do you have any artifical heart valves or other implanted medical devices like a pacemaker, defibrillator, or continuous glucose monitor? No   16. Do you have artificial joints? YES - Right Hip & Left Knee    17. Are you allergic to latex? No       Health Care Directive:  Patient does not have a Health Care Directive or Living Will: Patient states  "has Advance Directive and will bring in a copy to clinic.    Preoperative Review of :            Review of Systems  Constitutional, neuro, ENT, endocrine, pulmonary, cardiac, gastrointestinal, genitourinary, musculoskeletal, integument and psychiatric systems are negative, except as otherwise noted.    Patient Active Problem List    Diagnosis Date Noted     Healthcare maintenance 01/21/2022     Priority: Medium     mammo annual  Colon 2017 dye 2022  dexa 2021 normal        Obesity (BMI 35.0-39.9) with comorbidity (H) 02/24/2021     Priority: Medium     Agatston coronary artery calcium score between 200 and 399 01/14/2021     Priority: Medium     Incidental lung nodule, less than or equal to 3mm 01/14/2021     Priority: Medium     Noted 2021 repeat ct in 2022         Leg edema 11/17/2020     Priority: Medium     Bilateral shoulder pain 11/16/2020     Priority: Medium     Left knee pain 01/02/2020     Priority: Medium     Rectocele 01/01/2019     Priority: Medium     Essential Hypertension      Priority: Medium     Created by Conversion  Replacement Utility updated for latest IMO load         Chronic renal insufficiency, stage 1 04/08/2016     Priority: Medium     Chest Pain      Priority: Medium     Created by Conversion          Past Medical History:   Diagnosis Date     Acid reflux      Arthritis      History of blood clots 1980    Spontaneous \"thrombophlebitis\" treated with Coumadin for 3 months     History of pulmonary embolus (PE) 1972    Associated with Dalkon shield     Hypertension      Psoriasis      Rectocele      Thrombophlebitis 1980     Past Surgical History:   Procedure Laterality Date     APPENDECTOMY       ARTHROSCOPY KNEE       JOINT REPLACEMENT       REPLACEMENT TOTAL KNEE Left 1/2/2020    Procedure: LEFT TOTAL KNEE ARTHROPLASTY, COMPUTER-ASSIST;  Surgeon: Jb Luevano MD;  Location: [facility];  Service: Orthopedics     Current Outpatient Medications   Medication Sig Dispense Refill "     atorvastatin (LIPITOR) 20 MG tablet TAKE 1 TABLET (20 MG TOTAL) BY MOUTH DAILY. 90 tablet 3     calcium carbonate-vitamin D3 (CALCIUM 600 + D,3,) 600 mg calcium- 200 unit cap [CALCIUM CARBONATE-VITAMIN D3 (CALCIUM 600 + D,3,) 600 MG CALCIUM- 200 UNIT CAP] Take 1 tablet by mouth 2 (two) times a day.        coenzyme Q-10 200 MG CAPS Take 1 capsule by mouth daily       DOCOSAHEXANOIC ACID/EPA (FISH OIL ORAL) [DOCOSAHEXANOIC ACID/EPA (FISH OIL ORAL)] Take 1,000 mg by mouth 2 (two) times a day.        furosemide (LASIX) 20 MG tablet TAKE 1 TABLET (20 MG TOTAL) BY MOUTH DAILY. 90 tablet 3     Glucosamine HCl (GLUCOSAMINE PO) Take 1 tablet by mouth daily       Glucosamine Sulfate 1000 MG TABS Take 1 tablet by mouth daily       losartan (COZAAR) 100 MG tablet TAKE 1 TABLET (100 MG TOTAL) BY MOUTH DAILY. 90 tablet 1     melatonin 10 mg Tab [MELATONIN 10 MG TAB] Take 1 tablet by mouth at bedtime.       multivitamin therapeutic (THERAGRAN) tablet [MULTIVITAMIN THERAPEUTIC (THERAGRAN) TABLET] Take 1 tablet by mouth daily.       omeprazole (PRILOSEC) 20 MG DR capsule TAKE ONE CAPSULE ORALLY DAILY 90 capsule 3     senna (SENOKOT) 8.6 mg tablet [SENNA (SENOKOT) 8.6 MG TABLET] Take 1 tablet by mouth daily.       sulindac (CLINORIL) 200 MG tablet Take 1 tablet (200 mg) by mouth 2 times daily 60 tablet 12     diphenhydrAMINE (BENADRYL) 25 mg capsule [DIPHENHYDRAMINE (BENADRYL) 25 MG CAPSULE] Take 25 mg by mouth at bedtime.         No Known Allergies     Social History     Tobacco Use     Smoking status: Never     Smokeless tobacco: Never   Substance Use Topics     Alcohol use: Yes     Comment: Alcoholic Drinks/day: Wine 1 glass daily        History   Drug Use Unknown         Objective     /82 (BP Location: Left arm, Patient Position: Sitting, Cuff Size: Adult Regular)   Pulse 71   Temp 98  F (36.7  C)   Wt 99.8 kg (220 lb)   SpO2 98%   BMI 36.33 kg/m      Physical Exam    GENERAL APPEARANCE: healthy, alert and no  distress     EYES: EOMI, PERRL     HENT: ear canals and TM's normal and nose and mouth without ulcers or lesions     NECK: no adenopathy, no asymmetry, masses, or scars and thyroid normal to palpation     RESP: lungs clear to auscultation - no rales, rhonchi or wheezes     CV: regular rates and rhythm, normal S1 S2, no S3 or S4 and no murmur, click or rub     ABDOMEN:  soft, nontender, no HSM or masses and bowel sounds normal     MS: extremities normal- no gross deformities noted, no evidence of inflammation in joints, FROM in all extremities.     SKIN: no suspicious lesions or rashes     NEURO: Normal strength and tone, sensory exam grossly normal, mentation intact and speech normal     PSYCH: mentation appears normal. and affect normal/bright     LYMPHATICS: No cervical adenopathy    Recent Labs   Lab Test 01/21/22  0940 12/18/20  1027   HGB 11.3* 14.1    262    141   POTASSIUM 3.8 4.0   CR 0.76 0.79   A1C 5.6 5.4        Diagnostics:  Labs pending at this time.  Results will be reviewed when available.   EKG: appears normal, NSR, normal axis, normal intervals, no acute ST/T changes c/w ischemia, no LVH by voltage criteria, unchanged from previous tracings    Revised Cardiac Risk Index (RCRI):  The patient has the following serious cardiovascular risks for perioperative complications:   - No serious cardiac risks = 0 points     RCRI Interpretation: 1 point: Class II (low risk - 0.9% complication rate)           Signed Electronically by: Marcela Amato MD  Copy of this evaluation report is provided to requesting physician.

## 2022-12-14 NOTE — H&P (VIEW-ONLY)
[facility]  [address] [place] [address]  Phone: [phone]  Fax: [phone]  Primary Provider: Eliu Meier  Pre-op Performing Provider: ELIU MEIER      PREOPERATIVE EVALUATION:  Today's date: 12/14/2022    Munira Rodriguez is a 72 year old female who presents for a preoperative evaluation.    Surgical Information:  Surgery/Procedure: Left Shoulder replacement  Surgery Location:  [facility]   Surgeon: Dr. Darion Mishra  Surgery Date: 1/11/22  Time of Surgery: TBD  Where patient plans to recover: At home with family  Fax number for surgical facility: Note does not need to be faxed, will be available electronically in Epic.    Type of Anesthesia Anticipated: GA     Assessment & Plan     The proposed surgical procedure is considered LOW risk.    Chronic renal insufficiency, stage 1      Pre-op exam  Recommend she take baby aspirin after surgery she had DVT in 1978 she did not have any complications post hip or knee replacement but she was given low-dose molecular heparin and aspirin postoperatively.  - EKG 12-lead, tracing only  - Asymptomatic COVID-19 Virus (Coronavirus) by PCR Nasopharyngeal  - CBC with platelets  - Basic metabolic panel  (Ca, Cl, CO2, Creat, Gluc, K, Na, BUN)  - CBC with platelets  - Basic metabolic panel  (Ca, Cl, CO2, Creat, Gluc, K, Na, BUN)    Hordeolum externum of right lower eyelid  Recommend conservative care for the stye she is already getting better if she does notice any purulent discharge and she can take erythromycin.  - erythromycin (ROMYCIN) 5 MG/GM ophthalmic ointment  Dispense: 3.5 g; Refill: 0    Gastroesophageal reflux disease with esophagitis without hemorrhage  For the intermittent heartburn that she is experiencing recommend Pepcid if it does not improve she can let us know.  - famotidine (PEPCID) 20 MG tablet  Dispense: 34 tablet; Refill: 1  She will do COVID test 3 days before  surgery               Medication Instructions:  Patient is to hold her morning medications and take them later on she is to hold her sulindac.    RECOMMENDATION:  APPROVAL GIVEN to proceed with proposed procedure, without further diagnostic evaluation.          \  Subjective     HPI related to upcoming procedure: had covid in October , terrible sore throat wp,en    Preop Questions 12/12/2022   1. Have you ever had a heart attack or stroke? No   2. Have you ever had surgery on your heart or blood vessels, such as a stent placement, a coronary artery bypass, or surgery on an artery in your head, neck, heart, or legs? No   3. Do you have chest pain with activity? No   4. Do you have a history of  heart failure? No   5. Do you currently have a cold, bronchitis or symptoms of other infection? No   6. Do you have a cough, shortness of breath, or wheezing? No   7. Do you or anyone in your family have previous history of blood clots? YES    8. Do you or does anyone in your family have a serious bleeding problem such as prolonged bleeding following surgeries or cuts? No   9. Have you ever had problems with anemia or been told to take iron pills? No   10. Have you had any abnormal blood loss such as black, tarry or bloody stools, or abnormal vaginal bleeding? No   11. Have you ever had a blood transfusion? No   12. Are you willing to have a blood transfusion if it is medically needed before, during, or after your surgery? Yes   13. Have you or any of your relatives ever had problems with anesthesia? No   14. Do you have sleep apnea, excessive snoring or daytime drowsiness? No   15. Do you have any artifical heart valves or other implanted medical devices like a pacemaker, defibrillator, or continuous glucose monitor? No   16. Do you have artificial joints? YES - Right Hip & Left Knee    17. Are you allergic to latex? No       Health Care Directive:  Patient does not have a Health Care Directive or Living Will: Patient states  "has Advance Directive and will bring in a copy to clinic.    Preoperative Review of :            Review of Systems  Constitutional, neuro, ENT, endocrine, pulmonary, cardiac, gastrointestinal, genitourinary, musculoskeletal, integument and psychiatric systems are negative, except as otherwise noted.    Patient Active Problem List    Diagnosis Date Noted     Healthcare maintenance 01/21/2022     Priority: Medium     mammo annual  Colon 2017 dye 2022  dexa 2021 normal        Obesity (BMI 35.0-39.9) with comorbidity (H) 02/24/2021     Priority: Medium     Agatston coronary artery calcium score between 200 and 399 01/14/2021     Priority: Medium     Incidental lung nodule, less than or equal to 3mm 01/14/2021     Priority: Medium     Noted 2021 repeat ct in 2022         Leg edema 11/17/2020     Priority: Medium     Bilateral shoulder pain 11/16/2020     Priority: Medium     Left knee pain 01/02/2020     Priority: Medium     Rectocele 01/01/2019     Priority: Medium     Essential Hypertension      Priority: Medium     Created by Conversion  Replacement Utility updated for latest IMO load         Chronic renal insufficiency, stage 1 04/08/2016     Priority: Medium     Chest Pain      Priority: Medium     Created by Conversion          Past Medical History:   Diagnosis Date     Acid reflux      Arthritis      History of blood clots 1980    Spontaneous \"thrombophlebitis\" treated with Coumadin for 3 months     History of pulmonary embolus (PE) 1972    Associated with Dalkon shield     Hypertension      Psoriasis      Rectocele      Thrombophlebitis 1980     Past Surgical History:   Procedure Laterality Date     APPENDECTOMY       ARTHROSCOPY KNEE       JOINT REPLACEMENT       REPLACEMENT TOTAL KNEE Left 1/2/2020    Procedure: LEFT TOTAL KNEE ARTHROPLASTY, COMPUTER-ASSIST;  Surgeon: Jb Luevano MD;  Location: [facility];  Service: Orthopedics     Current Outpatient Medications   Medication Sig Dispense Refill "     atorvastatin (LIPITOR) 20 MG tablet TAKE 1 TABLET (20 MG TOTAL) BY MOUTH DAILY. 90 tablet 3     calcium carbonate-vitamin D3 (CALCIUM 600 + D,3,) 600 mg calcium- 200 unit cap [CALCIUM CARBONATE-VITAMIN D3 (CALCIUM 600 + D,3,) 600 MG CALCIUM- 200 UNIT CAP] Take 1 tablet by mouth 2 (two) times a day.        coenzyme Q-10 200 MG CAPS Take 1 capsule by mouth daily       DOCOSAHEXANOIC ACID/EPA (FISH OIL ORAL) [DOCOSAHEXANOIC ACID/EPA (FISH OIL ORAL)] Take 1,000 mg by mouth 2 (two) times a day.        furosemide (LASIX) 20 MG tablet TAKE 1 TABLET (20 MG TOTAL) BY MOUTH DAILY. 90 tablet 3     Glucosamine HCl (GLUCOSAMINE PO) Take 1 tablet by mouth daily       Glucosamine Sulfate 1000 MG TABS Take 1 tablet by mouth daily       losartan (COZAAR) 100 MG tablet TAKE 1 TABLET (100 MG TOTAL) BY MOUTH DAILY. 90 tablet 1     melatonin 10 mg Tab [MELATONIN 10 MG TAB] Take 1 tablet by mouth at bedtime.       multivitamin therapeutic (THERAGRAN) tablet [MULTIVITAMIN THERAPEUTIC (THERAGRAN) TABLET] Take 1 tablet by mouth daily.       omeprazole (PRILOSEC) 20 MG DR capsule TAKE ONE CAPSULE ORALLY DAILY 90 capsule 3     senna (SENOKOT) 8.6 mg tablet [SENNA (SENOKOT) 8.6 MG TABLET] Take 1 tablet by mouth daily.       sulindac (CLINORIL) 200 MG tablet Take 1 tablet (200 mg) by mouth 2 times daily 60 tablet 12     diphenhydrAMINE (BENADRYL) 25 mg capsule [DIPHENHYDRAMINE (BENADRYL) 25 MG CAPSULE] Take 25 mg by mouth at bedtime.         No Known Allergies     Social History     Tobacco Use     Smoking status: Never     Smokeless tobacco: Never   Substance Use Topics     Alcohol use: Yes     Comment: Alcoholic Drinks/day: Wine 1 glass daily        History   Drug Use Unknown         Objective     /82 (BP Location: Left arm, Patient Position: Sitting, Cuff Size: Adult Regular)   Pulse 71   Temp 98  F (36.7  C)   Wt 99.8 kg (220 lb)   SpO2 98%   BMI 36.33 kg/m      Physical Exam    GENERAL APPEARANCE: healthy, alert and no  distress     EYES: EOMI, PERRL     HENT: ear canals and TM's normal and nose and mouth without ulcers or lesions     NECK: no adenopathy, no asymmetry, masses, or scars and thyroid normal to palpation     RESP: lungs clear to auscultation - no rales, rhonchi or wheezes     CV: regular rates and rhythm, normal S1 S2, no S3 or S4 and no murmur, click or rub     ABDOMEN:  soft, nontender, no HSM or masses and bowel sounds normal     MS: extremities normal- no gross deformities noted, no evidence of inflammation in joints, FROM in all extremities.     SKIN: no suspicious lesions or rashes     NEURO: Normal strength and tone, sensory exam grossly normal, mentation intact and speech normal     PSYCH: mentation appears normal. and affect normal/bright     LYMPHATICS: No cervical adenopathy    Recent Labs   Lab Test 01/21/22  0940 12/18/20  1027   HGB 11.3* 14.1    262    141   POTASSIUM 3.8 4.0   CR 0.76 0.79   A1C 5.6 5.4        Diagnostics:  Labs pending at this time.  Results will be reviewed when available.   EKG: appears normal, NSR, normal axis, normal intervals, no acute ST/T changes c/w ischemia, no LVH by voltage criteria, unchanged from previous tracings    Revised Cardiac Risk Index (RCRI):  The patient has the following serious cardiovascular risks for perioperative complications:   - No serious cardiac risks = 0 points     RCRI Interpretation: 1 point: Class II (low risk - 0.9% complication rate)           Signed Electronically by: Marcela Amato MD  Copy of this evaluation report is provided to requesting physician.

## 2023-01-10 ENCOUNTER — ANESTHESIA EVENT (OUTPATIENT)
Dept: SURGERY | Facility: CLINIC | Age: 73
End: 2023-01-10
Payer: MEDICARE

## 2023-01-11 ENCOUNTER — ANESTHESIA (OUTPATIENT)
Dept: SURGERY | Facility: CLINIC | Age: 73
End: 2023-01-11
Payer: MEDICARE

## 2023-01-11 ENCOUNTER — HOSPITAL ENCOUNTER (OUTPATIENT)
Facility: CLINIC | Age: 73
Discharge: HOME | End: 2023-01-12
Attending: ORTHOPAEDIC SURGERY | Admitting: ORTHOPAEDIC SURGERY
Payer: MEDICARE

## 2023-01-11 ENCOUNTER — APPOINTMENT (OUTPATIENT)
Dept: RADIOLOGY | Facility: CLINIC | Age: 73
End: 2023-01-11
Attending: PHYSICIAN ASSISTANT
Payer: MEDICARE

## 2023-01-11 DIAGNOSIS — Z96.612 S/P REVERSE TOTAL SHOULDER ARTHROPLASTY, LEFT: Primary | ICD-10-CM

## 2023-01-11 LAB
ABO/RH(D): NORMAL
ANTIBODY SCREEN: NEGATIVE
APTT PPP: 32 SECONDS (ref 22–38)
CREAT SERPL-MCNC: 0.71 MG/DL (ref 0.6–1.1)
ERYTHROCYTE [DISTWIDTH] IN BLOOD BY AUTOMATED COUNT: 14.8 % (ref 10–15)
GFR SERPL CREATININE-BSD FRML MDRD: 89 ML/MIN/1.73M2
HCT VFR BLD AUTO: 36.3 % (ref 35–47)
HGB BLD-MCNC: 11.5 G/DL (ref 11.7–15.7)
INR PPP: 0.98 (ref 0.85–1.15)
MCH RBC QN AUTO: 29.9 PG (ref 26.5–33)
MCHC RBC AUTO-ENTMCNC: 31.7 G/DL (ref 31.5–36.5)
MCV RBC AUTO: 95 FL (ref 78–100)
PLATELET # BLD AUTO: 267 10E3/UL (ref 150–450)
POTASSIUM BLD-SCNC: 3.9 MMOL/L (ref 3.5–5)
RBC # BLD AUTO: 3.84 10E6/UL (ref 3.8–5.2)
SPECIMEN EXPIRATION DATE: NORMAL
WBC # BLD AUTO: 5.4 10E3/UL (ref 4–11)

## 2023-01-11 PROCEDURE — 258N000003 HC RX IP 258 OP 636: Performed by: ANESTHESIOLOGY

## 2023-01-11 PROCEDURE — 84132 ASSAY OF SERUM POTASSIUM: CPT | Performed by: PHYSICIAN ASSISTANT

## 2023-01-11 PROCEDURE — 999N000065 XR SHOULDER LEFT PORT G/E 2 VIEWS: Mod: LT

## 2023-01-11 PROCEDURE — 85610 PROTHROMBIN TIME: CPT | Performed by: PHYSICIAN ASSISTANT

## 2023-01-11 PROCEDURE — 85027 COMPLETE CBC AUTOMATED: CPT | Performed by: PHYSICIAN ASSISTANT

## 2023-01-11 PROCEDURE — 250N000011 HC RX IP 250 OP 636: Performed by: ORTHOPAEDIC SURGERY

## 2023-01-11 PROCEDURE — 258N000003 HC RX IP 258 OP 636: Performed by: NURSE ANESTHETIST, CERTIFIED REGISTERED

## 2023-01-11 PROCEDURE — 250N000013 HC RX MED GY IP 250 OP 250 PS 637: Performed by: HOSPITALIST

## 2023-01-11 PROCEDURE — 710N000010 HC RECOVERY PHASE 1, LEVEL 2, PER MIN: Performed by: ORTHOPAEDIC SURGERY

## 2023-01-11 PROCEDURE — 250N000011 HC RX IP 250 OP 636: Performed by: NURSE ANESTHETIST, CERTIFIED REGISTERED

## 2023-01-11 PROCEDURE — 250N000013 HC RX MED GY IP 250 OP 250 PS 637: Performed by: PHYSICIAN ASSISTANT

## 2023-01-11 PROCEDURE — 85730 THROMBOPLASTIN TIME PARTIAL: CPT | Mod: GZ | Performed by: PHYSICIAN ASSISTANT

## 2023-01-11 PROCEDURE — 86901 BLOOD TYPING SEROLOGIC RH(D): CPT | Performed by: PHYSICIAN ASSISTANT

## 2023-01-11 PROCEDURE — 250N000011 HC RX IP 250 OP 636: Performed by: PHYSICIAN ASSISTANT

## 2023-01-11 PROCEDURE — 82565 ASSAY OF CREATININE: CPT | Performed by: PHYSICIAN ASSISTANT

## 2023-01-11 PROCEDURE — 250N000009 HC RX 250: Performed by: ORTHOPAEDIC SURGERY

## 2023-01-11 PROCEDURE — 99222 1ST HOSP IP/OBS MODERATE 55: CPT | Mod: GC | Performed by: HOSPITALIST

## 2023-01-11 PROCEDURE — 370N000017 HC ANESTHESIA TECHNICAL FEE, PER MIN: Performed by: ORTHOPAEDIC SURGERY

## 2023-01-11 PROCEDURE — 250N000011 HC RX IP 250 OP 636: Performed by: ANESTHESIOLOGY

## 2023-01-11 PROCEDURE — C9290 INJ, BUPIVACAINE LIPOSOME: HCPCS | Performed by: ANESTHESIOLOGY

## 2023-01-11 PROCEDURE — 250N000009 HC RX 250: Performed by: NURSE ANESTHETIST, CERTIFIED REGISTERED

## 2023-01-11 PROCEDURE — 36415 COLL VENOUS BLD VENIPUNCTURE: CPT | Performed by: PHYSICIAN ASSISTANT

## 2023-01-11 PROCEDURE — C1776 JOINT DEVICE (IMPLANTABLE): HCPCS | Performed by: ORTHOPAEDIC SURGERY

## 2023-01-11 PROCEDURE — C1713 ANCHOR/SCREW BN/BN,TIS/BN: HCPCS | Performed by: ORTHOPAEDIC SURGERY

## 2023-01-11 PROCEDURE — 360N000077 HC SURGERY LEVEL 4, PER MIN: Performed by: ORTHOPAEDIC SURGERY

## 2023-01-11 PROCEDURE — 250N000009 HC RX 250: Performed by: ANESTHESIOLOGY

## 2023-01-11 PROCEDURE — 999N000141 HC STATISTIC PRE-PROCEDURE NURSING ASSESSMENT: Performed by: ORTHOPAEDIC SURGERY

## 2023-01-11 PROCEDURE — 258N000003 HC RX IP 258 OP 636: Performed by: PHYSICIAN ASSISTANT

## 2023-01-11 PROCEDURE — 272N000001 HC OR GENERAL SUPPLY STERILE: Performed by: ORTHOPAEDIC SURGERY

## 2023-01-11 DEVICE — IMPLANTABLE DEVICE
Type: IMPLANTABLE DEVICE | Site: SHOULDER | Status: FUNCTIONAL
Brand: TORNIER PERFORM® REVERSED GLENOID

## 2023-01-11 DEVICE — IMPLANTABLE DEVICE
Type: IMPLANTABLE DEVICE | Site: SHOULDER | Status: FUNCTIONAL
Brand: TORNIER FLEX SHOULDER SYSTEM

## 2023-01-11 DEVICE — SCREW CENTRAL 6.5X30MM DWJ130: Type: IMPLANTABLE DEVICE | Site: SHOULDER | Status: FUNCTIONAL

## 2023-01-11 DEVICE — SCREW PERIPHERAL 5.0X30MM DWJ330: Type: IMPLANTABLE DEVICE | Site: SHOULDER | Status: FUNCTIONAL

## 2023-01-11 DEVICE — SCREW PERIPHERAL 22MM: Type: IMPLANTABLE DEVICE | Site: SHOULDER | Status: FUNCTIONAL

## 2023-01-11 DEVICE — SCREW PERIPHERAL 14MM DWJ314: Type: IMPLANTABLE DEVICE | Site: SHOULDER | Status: FUNCTIONAL

## 2023-01-11 DEVICE — IMPLANTABLE DEVICE
Type: IMPLANTABLE DEVICE | Site: SHOULDER | Status: FUNCTIONAL
Brand: TORNIER PERFORM® REVERSED AUGMENTED GLENOID

## 2023-01-11 RX ORDER — FENTANYL CITRATE 50 UG/ML
50 INJECTION, SOLUTION INTRAMUSCULAR; INTRAVENOUS EVERY 5 MIN PRN
Status: DISCONTINUED | OUTPATIENT
Start: 2023-01-11 | End: 2023-01-11 | Stop reason: HOSPADM

## 2023-01-11 RX ORDER — NALOXONE HYDROCHLORIDE 0.4 MG/ML
0.2 INJECTION, SOLUTION INTRAMUSCULAR; INTRAVENOUS; SUBCUTANEOUS
Status: DISCONTINUED | OUTPATIENT
Start: 2023-01-11 | End: 2023-01-12 | Stop reason: HOSPADM

## 2023-01-11 RX ORDER — SODIUM CHLORIDE, SODIUM LACTATE, POTASSIUM CHLORIDE, CALCIUM CHLORIDE 600; 310; 30; 20 MG/100ML; MG/100ML; MG/100ML; MG/100ML
INJECTION, SOLUTION INTRAVENOUS CONTINUOUS
Status: DISCONTINUED | OUTPATIENT
Start: 2023-01-11 | End: 2023-01-11 | Stop reason: HOSPADM

## 2023-01-11 RX ORDER — PROPOFOL 10 MG/ML
INJECTION, EMULSION INTRAVENOUS CONTINUOUS PRN
Status: DISCONTINUED | OUTPATIENT
Start: 2023-01-11 | End: 2023-01-11

## 2023-01-11 RX ORDER — ONDANSETRON 2 MG/ML
4 INJECTION INTRAMUSCULAR; INTRAVENOUS EVERY 30 MIN PRN
Status: DISCONTINUED | OUTPATIENT
Start: 2023-01-11 | End: 2023-01-11 | Stop reason: HOSPADM

## 2023-01-11 RX ORDER — FENTANYL CITRATE 50 UG/ML
INJECTION, SOLUTION INTRAMUSCULAR; INTRAVENOUS PRN
Status: DISCONTINUED | OUTPATIENT
Start: 2023-01-11 | End: 2023-01-11

## 2023-01-11 RX ORDER — FUROSEMIDE 20 MG
20 TABLET ORAL DAILY
Status: DISCONTINUED | OUTPATIENT
Start: 2023-01-12 | End: 2023-01-12 | Stop reason: HOSPADM

## 2023-01-11 RX ORDER — PROCHLORPERAZINE MALEATE 5 MG
5 TABLET ORAL EVERY 6 HOURS PRN
Status: DISCONTINUED | OUTPATIENT
Start: 2023-01-11 | End: 2023-01-12 | Stop reason: HOSPADM

## 2023-01-11 RX ORDER — HYDROMORPHONE HCL IN WATER/PF 6 MG/30 ML
0.2 PATIENT CONTROLLED ANALGESIA SYRINGE INTRAVENOUS EVERY 5 MIN PRN
Status: DISCONTINUED | OUTPATIENT
Start: 2023-01-11 | End: 2023-01-11 | Stop reason: HOSPADM

## 2023-01-11 RX ORDER — CHOLECALCIFEROL (VITAMIN D3) 50 MCG
1 TABLET ORAL 2 TIMES DAILY
COMMUNITY

## 2023-01-11 RX ORDER — BISACODYL 10 MG
10 SUPPOSITORY, RECTAL RECTAL DAILY PRN
Status: DISCONTINUED | OUTPATIENT
Start: 2023-01-11 | End: 2023-01-12 | Stop reason: HOSPADM

## 2023-01-11 RX ORDER — ONDANSETRON 2 MG/ML
INJECTION INTRAMUSCULAR; INTRAVENOUS PRN
Status: DISCONTINUED | OUTPATIENT
Start: 2023-01-11 | End: 2023-01-11

## 2023-01-11 RX ORDER — FENTANYL CITRATE 50 UG/ML
25 INJECTION, SOLUTION INTRAMUSCULAR; INTRAVENOUS EVERY 5 MIN PRN
Status: DISCONTINUED | OUTPATIENT
Start: 2023-01-11 | End: 2023-01-11 | Stop reason: HOSPADM

## 2023-01-11 RX ORDER — VANCOMYCIN HYDROCHLORIDE 1 G/20ML
1 INJECTION, POWDER, LYOPHILIZED, FOR SOLUTION INTRAVENOUS ONCE
Status: DISCONTINUED | OUTPATIENT
Start: 2023-01-11 | End: 2023-01-11 | Stop reason: CLARIF

## 2023-01-11 RX ORDER — PROPOFOL 10 MG/ML
INJECTION, EMULSION INTRAVENOUS PRN
Status: DISCONTINUED | OUTPATIENT
Start: 2023-01-11 | End: 2023-01-11

## 2023-01-11 RX ORDER — HYDROMORPHONE HCL IN WATER/PF 6 MG/30 ML
0.2 PATIENT CONTROLLED ANALGESIA SYRINGE INTRAVENOUS
Status: DISCONTINUED | OUTPATIENT
Start: 2023-01-11 | End: 2023-01-12 | Stop reason: HOSPADM

## 2023-01-11 RX ORDER — ONDANSETRON 4 MG/1
4 TABLET, ORALLY DISINTEGRATING ORAL EVERY 6 HOURS PRN
Status: DISCONTINUED | OUTPATIENT
Start: 2023-01-11 | End: 2023-01-12 | Stop reason: HOSPADM

## 2023-01-11 RX ORDER — ONDANSETRON 4 MG/1
4 TABLET, ORALLY DISINTEGRATING ORAL EVERY 30 MIN PRN
Status: DISCONTINUED | OUTPATIENT
Start: 2023-01-11 | End: 2023-01-11 | Stop reason: HOSPADM

## 2023-01-11 RX ORDER — OXYCODONE HYDROCHLORIDE 5 MG/1
10 TABLET ORAL EVERY 4 HOURS PRN
Status: DISCONTINUED | OUTPATIENT
Start: 2023-01-11 | End: 2023-01-12 | Stop reason: HOSPADM

## 2023-01-11 RX ORDER — OXYCODONE HYDROCHLORIDE 5 MG/1
5-10 TABLET ORAL EVERY 4 HOURS PRN
Qty: 20 TABLET | Refills: 0 | Status: SHIPPED | OUTPATIENT
Start: 2023-01-11 | End: 2023-04-05

## 2023-01-11 RX ORDER — FAMOTIDINE 20 MG/1
20 TABLET, FILM COATED ORAL 2 TIMES DAILY
Status: DISCONTINUED | OUTPATIENT
Start: 2023-01-11 | End: 2023-01-12 | Stop reason: HOSPADM

## 2023-01-11 RX ORDER — VANCOMYCIN HYDROCHLORIDE 1 G/20ML
INJECTION, POWDER, LYOPHILIZED, FOR SOLUTION INTRAVENOUS PRN
Status: DISCONTINUED | OUTPATIENT
Start: 2023-01-11 | End: 2023-01-11 | Stop reason: HOSPADM

## 2023-01-11 RX ORDER — ACETAMINOPHEN 325 MG/1
650 TABLET ORAL EVERY 4 HOURS PRN
Status: DISCONTINUED | OUTPATIENT
Start: 2023-01-14 | End: 2023-01-12 | Stop reason: HOSPADM

## 2023-01-11 RX ORDER — VANCOMYCIN HYDROCHLORIDE 1 G/20ML
INJECTION, POWDER, LYOPHILIZED, FOR SOLUTION INTRAVENOUS
Status: DISCONTINUED
Start: 2023-01-11 | End: 2023-01-11 | Stop reason: HOSPADM

## 2023-01-11 RX ORDER — IBUPROFEN 600 MG/1
600 TABLET, FILM COATED ORAL EVERY 6 HOURS PRN
Status: DISCONTINUED | OUTPATIENT
Start: 2023-01-11 | End: 2023-01-12 | Stop reason: HOSPADM

## 2023-01-11 RX ORDER — HYDROMORPHONE HCL IN WATER/PF 6 MG/30 ML
0.4 PATIENT CONTROLLED ANALGESIA SYRINGE INTRAVENOUS
Status: DISCONTINUED | OUTPATIENT
Start: 2023-01-11 | End: 2023-01-12 | Stop reason: HOSPADM

## 2023-01-11 RX ORDER — BUPIVACAINE HYDROCHLORIDE AND EPINEPHRINE 2.5; 5 MG/ML; UG/ML
INJECTION, SOLUTION INFILTRATION; PERINEURAL
Status: COMPLETED | OUTPATIENT
Start: 2023-01-11 | End: 2023-01-11

## 2023-01-11 RX ORDER — NALOXONE HYDROCHLORIDE 0.4 MG/ML
0.4 INJECTION, SOLUTION INTRAMUSCULAR; INTRAVENOUS; SUBCUTANEOUS
Status: DISCONTINUED | OUTPATIENT
Start: 2023-01-11 | End: 2023-01-12 | Stop reason: HOSPADM

## 2023-01-11 RX ORDER — OXYCODONE HYDROCHLORIDE 5 MG/1
5 TABLET ORAL EVERY 4 HOURS PRN
Status: DISCONTINUED | OUTPATIENT
Start: 2023-01-11 | End: 2023-01-12 | Stop reason: HOSPADM

## 2023-01-11 RX ORDER — FENTANYL CITRATE 50 UG/ML
25-100 INJECTION, SOLUTION INTRAMUSCULAR; INTRAVENOUS
Status: DISCONTINUED | OUTPATIENT
Start: 2023-01-11 | End: 2023-01-11 | Stop reason: HOSPADM

## 2023-01-11 RX ORDER — ACETAMINOPHEN 500 MG
500 TABLET ORAL EVERY 6 HOURS PRN
COMMUNITY

## 2023-01-11 RX ORDER — CEFAZOLIN SODIUM/WATER 2 G/20 ML
2 SYRINGE (ML) INTRAVENOUS SEE ADMIN INSTRUCTIONS
Status: DISCONTINUED | OUTPATIENT
Start: 2023-01-11 | End: 2023-01-11 | Stop reason: HOSPADM

## 2023-01-11 RX ORDER — DIPHENHYDRAMINE HCL 12.5 MG/5ML
12.5 SOLUTION ORAL EVERY 6 HOURS PRN
Status: DISCONTINUED | OUTPATIENT
Start: 2023-01-11 | End: 2023-01-12 | Stop reason: HOSPADM

## 2023-01-11 RX ORDER — LOSARTAN POTASSIUM 50 MG/1
100 TABLET ORAL DAILY
Status: DISCONTINUED | OUTPATIENT
Start: 2023-01-12 | End: 2023-01-12 | Stop reason: HOSPADM

## 2023-01-11 RX ORDER — SODIUM CHLORIDE, SODIUM LACTATE, POTASSIUM CHLORIDE, CALCIUM CHLORIDE 600; 310; 30; 20 MG/100ML; MG/100ML; MG/100ML; MG/100ML
INJECTION, SOLUTION INTRAVENOUS CONTINUOUS
Status: DISCONTINUED | OUTPATIENT
Start: 2023-01-11 | End: 2023-01-12 | Stop reason: HOSPADM

## 2023-01-11 RX ORDER — LIDOCAINE 40 MG/G
CREAM TOPICAL
Status: DISCONTINUED | OUTPATIENT
Start: 2023-01-11 | End: 2023-01-11 | Stop reason: HOSPADM

## 2023-01-11 RX ORDER — LIDOCAINE 40 MG/G
CREAM TOPICAL
Status: DISCONTINUED | OUTPATIENT
Start: 2023-01-11 | End: 2023-01-12 | Stop reason: HOSPADM

## 2023-01-11 RX ORDER — ACETAMINOPHEN 325 MG/1
975 TABLET ORAL ONCE
Status: DISCONTINUED | OUTPATIENT
Start: 2023-01-11 | End: 2023-01-11 | Stop reason: HOSPADM

## 2023-01-11 RX ORDER — HALOPERIDOL 5 MG/ML
1 INJECTION INTRAMUSCULAR
Status: DISCONTINUED | OUTPATIENT
Start: 2023-01-11 | End: 2023-01-11 | Stop reason: HOSPADM

## 2023-01-11 RX ORDER — CEFAZOLIN SODIUM/WATER 2 G/20 ML
2 SYRINGE (ML) INTRAVENOUS
Status: COMPLETED | OUTPATIENT
Start: 2023-01-11 | End: 2023-01-11

## 2023-01-11 RX ORDER — ACETAMINOPHEN 325 MG/1
975 TABLET ORAL ONCE
Status: COMPLETED | OUTPATIENT
Start: 2023-01-11 | End: 2023-01-11

## 2023-01-11 RX ORDER — ONDANSETRON 2 MG/ML
4 INJECTION INTRAMUSCULAR; INTRAVENOUS EVERY 6 HOURS PRN
Status: DISCONTINUED | OUTPATIENT
Start: 2023-01-11 | End: 2023-01-12 | Stop reason: HOSPADM

## 2023-01-11 RX ORDER — POLYETHYLENE GLYCOL 3350 17 G/17G
17 POWDER, FOR SOLUTION ORAL DAILY
Status: DISCONTINUED | OUTPATIENT
Start: 2023-01-12 | End: 2023-01-12 | Stop reason: HOSPADM

## 2023-01-11 RX ORDER — HYDROXYZINE HYDROCHLORIDE 10 MG/1
10-20 TABLET, FILM COATED ORAL EVERY 4 HOURS PRN
Qty: 20 TABLET | Refills: 0 | Status: SHIPPED | OUTPATIENT
Start: 2023-01-11 | End: 2023-04-05

## 2023-01-11 RX ORDER — FENTANYL CITRATE 50 UG/ML
25 INJECTION, SOLUTION INTRAMUSCULAR; INTRAVENOUS
Status: DISCONTINUED | OUTPATIENT
Start: 2023-01-11 | End: 2023-01-11 | Stop reason: HOSPADM

## 2023-01-11 RX ORDER — CEFAZOLIN SODIUM 2 G/100ML
2 INJECTION, SOLUTION INTRAVENOUS EVERY 8 HOURS
Status: COMPLETED | OUTPATIENT
Start: 2023-01-11 | End: 2023-01-12

## 2023-01-11 RX ORDER — DEXAMETHASONE SODIUM PHOSPHATE 10 MG/ML
INJECTION, SOLUTION INTRAMUSCULAR; INTRAVENOUS PRN
Status: DISCONTINUED | OUTPATIENT
Start: 2023-01-11 | End: 2023-01-11

## 2023-01-11 RX ORDER — MAGNESIUM HYDROXIDE 1200 MG/15ML
LIQUID ORAL PRN
Status: DISCONTINUED | OUTPATIENT
Start: 2023-01-11 | End: 2023-01-11 | Stop reason: HOSPADM

## 2023-01-11 RX ORDER — AMOXICILLIN 250 MG
1 CAPSULE ORAL 2 TIMES DAILY
Status: DISCONTINUED | OUTPATIENT
Start: 2023-01-11 | End: 2023-01-12 | Stop reason: HOSPADM

## 2023-01-11 RX ORDER — TRANEXAMIC ACID 650 MG/1
1950 TABLET ORAL ONCE
Status: COMPLETED | OUTPATIENT
Start: 2023-01-11 | End: 2023-01-11

## 2023-01-11 RX ORDER — ATORVASTATIN CALCIUM 10 MG/1
20 TABLET, FILM COATED ORAL AT BEDTIME
Status: DISCONTINUED | OUTPATIENT
Start: 2023-01-11 | End: 2023-01-12 | Stop reason: HOSPADM

## 2023-01-11 RX ORDER — OXYCODONE HYDROCHLORIDE 5 MG/1
10 TABLET ORAL EVERY 4 HOURS PRN
Status: DISCONTINUED | OUTPATIENT
Start: 2023-01-11 | End: 2023-01-11 | Stop reason: HOSPADM

## 2023-01-11 RX ORDER — OXYCODONE HYDROCHLORIDE 5 MG/1
5 TABLET ORAL EVERY 4 HOURS PRN
Status: DISCONTINUED | OUTPATIENT
Start: 2023-01-11 | End: 2023-01-11 | Stop reason: HOSPADM

## 2023-01-11 RX ORDER — PANTOPRAZOLE SODIUM 20 MG/1
40 TABLET, DELAYED RELEASE ORAL
Status: DISCONTINUED | OUTPATIENT
Start: 2023-01-12 | End: 2023-01-12 | Stop reason: HOSPADM

## 2023-01-11 RX ORDER — HYDROMORPHONE HCL IN WATER/PF 6 MG/30 ML
0.4 PATIENT CONTROLLED ANALGESIA SYRINGE INTRAVENOUS EVERY 5 MIN PRN
Status: DISCONTINUED | OUTPATIENT
Start: 2023-01-11 | End: 2023-01-11 | Stop reason: HOSPADM

## 2023-01-11 RX ORDER — HYDROXYZINE HYDROCHLORIDE 10 MG/1
10 TABLET, FILM COATED ORAL EVERY 6 HOURS PRN
Status: DISCONTINUED | OUTPATIENT
Start: 2023-01-11 | End: 2023-01-12 | Stop reason: HOSPADM

## 2023-01-11 RX ORDER — ACETAMINOPHEN 325 MG/1
975 TABLET ORAL EVERY 8 HOURS
Status: DISCONTINUED | OUTPATIENT
Start: 2023-01-11 | End: 2023-01-12 | Stop reason: HOSPADM

## 2023-01-11 RX ORDER — CALCIUM CARBONATE 500 MG/1
0.5 TABLET, CHEWABLE ORAL 2 TIMES DAILY
COMMUNITY

## 2023-01-11 RX ADMIN — PHENYLEPHRINE HYDROCHLORIDE 100 MCG: 10 INJECTION INTRAVENOUS at 10:00

## 2023-01-11 RX ADMIN — PROPOFOL 150 MCG/KG/MIN: 10 INJECTION, EMULSION INTRAVENOUS at 09:30

## 2023-01-11 RX ADMIN — OXYCODONE HYDROCHLORIDE 5 MG: 5 TABLET ORAL at 13:16

## 2023-01-11 RX ADMIN — HYDROMORPHONE HYDROCHLORIDE 0.2 MG: 0.2 INJECTION, SOLUTION INTRAMUSCULAR; INTRAVENOUS; SUBCUTANEOUS at 13:14

## 2023-01-11 RX ADMIN — BUPIVACAINE HYDROCHLORIDE AND EPINEPHRINE BITARTRATE 5 ML: 2.5; .005 INJECTION, SOLUTION INFILTRATION; PERINEURAL at 09:05

## 2023-01-11 RX ADMIN — PHENYLEPHRINE HYDROCHLORIDE 0.5 MCG/KG/MIN: 10 INJECTION INTRAVENOUS at 09:42

## 2023-01-11 RX ADMIN — ATORVASTATIN CALCIUM 20 MG: 10 TABLET, FILM COATED ORAL at 20:51

## 2023-01-11 RX ADMIN — FENTANYL CITRATE 100 MCG: 50 INJECTION, SOLUTION INTRAMUSCULAR; INTRAVENOUS at 09:30

## 2023-01-11 RX ADMIN — ASPIRIN 325 MG: 325 TABLET, COATED ORAL at 16:44

## 2023-01-11 RX ADMIN — CEFAZOLIN SODIUM 2 G: 2 INJECTION, SOLUTION INTRAVENOUS at 16:44

## 2023-01-11 RX ADMIN — ONDANSETRON 4 MG: 2 INJECTION INTRAMUSCULAR; INTRAVENOUS at 09:45

## 2023-01-11 RX ADMIN — SODIUM CHLORIDE, POTASSIUM CHLORIDE, SODIUM LACTATE AND CALCIUM CHLORIDE: 600; 310; 30; 20 INJECTION, SOLUTION INTRAVENOUS at 14:26

## 2023-01-11 RX ADMIN — FENTANYL CITRATE 25 MCG: 50 INJECTION, SOLUTION INTRAMUSCULAR; INTRAVENOUS at 12:09

## 2023-01-11 RX ADMIN — BUPIVACAINE 10 ML: 13.3 INJECTION, SUSPENSION, LIPOSOMAL INFILTRATION at 09:05

## 2023-01-11 RX ADMIN — FENTANYL CITRATE 50 MCG: 50 INJECTION, SOLUTION INTRAMUSCULAR; INTRAVENOUS at 12:19

## 2023-01-11 RX ADMIN — DEXAMETHASONE SODIUM PHOSPHATE 10 MG: 10 INJECTION, SOLUTION INTRAMUSCULAR; INTRAVENOUS at 09:30

## 2023-01-11 RX ADMIN — BUPIVACAINE HYDROCHLORIDE AND EPINEPHRINE 10 ML: 2.5; 5 INJECTION, SOLUTION INFILTRATION; PERINEURAL at 09:05

## 2023-01-11 RX ADMIN — SODIUM CHLORIDE, POTASSIUM CHLORIDE, SODIUM LACTATE AND CALCIUM CHLORIDE: 600; 310; 30; 20 INJECTION, SOLUTION INTRAVENOUS at 08:39

## 2023-01-11 RX ADMIN — FAMOTIDINE 20 MG: 20 TABLET ORAL at 20:51

## 2023-01-11 RX ADMIN — Medication 2 G: at 09:23

## 2023-01-11 RX ADMIN — PHENYLEPHRINE HYDROCHLORIDE 100 MCG: 10 INJECTION INTRAVENOUS at 09:42

## 2023-01-11 RX ADMIN — OXYCODONE HYDROCHLORIDE 10 MG: 5 TABLET ORAL at 20:50

## 2023-01-11 RX ADMIN — MIDAZOLAM 1 MG: 1 INJECTION INTRAMUSCULAR; INTRAVENOUS at 08:52

## 2023-01-11 RX ADMIN — SODIUM CHLORIDE, POTASSIUM CHLORIDE, SODIUM LACTATE AND CALCIUM CHLORIDE: 600; 310; 30; 20 INJECTION, SOLUTION INTRAVENOUS at 10:21

## 2023-01-11 RX ADMIN — HYDROMORPHONE HYDROCHLORIDE 0.2 MG: 0.2 INJECTION, SOLUTION INTRAMUSCULAR; INTRAVENOUS; SUBCUTANEOUS at 12:43

## 2023-01-11 RX ADMIN — ACETAMINOPHEN 975 MG: 325 TABLET ORAL at 14:23

## 2023-01-11 RX ADMIN — TRANEXAMIC ACID 1950 MG: 650 TABLET ORAL at 08:39

## 2023-01-11 RX ADMIN — ACETAMINOPHEN 975 MG: 325 TABLET ORAL at 08:40

## 2023-01-11 RX ADMIN — HYDROMORPHONE HYDROCHLORIDE 0.4 MG: 0.2 INJECTION, SOLUTION INTRAMUSCULAR; INTRAVENOUS; SUBCUTANEOUS at 12:33

## 2023-01-11 RX ADMIN — SENNOSIDES AND DOCUSATE SODIUM 1 TABLET: 50; 8.6 TABLET ORAL at 20:50

## 2023-01-11 RX ADMIN — PROPOFOL 200 MG: 10 INJECTION, EMULSION INTRAVENOUS at 09:30

## 2023-01-11 RX ADMIN — FENTANYL CITRATE 50 MCG: 50 INJECTION, SOLUTION INTRAMUSCULAR; INTRAVENOUS at 08:52

## 2023-01-11 RX ADMIN — ROCURONIUM BROMIDE 50 MG: 10 INJECTION, SOLUTION INTRAVENOUS at 09:30

## 2023-01-11 RX ADMIN — ACETAMINOPHEN 975 MG: 325 TABLET ORAL at 20:49

## 2023-01-11 ASSESSMENT — ACTIVITIES OF DAILY LIVING (ADL)
ADLS_ACUITY_SCORE: 22
ADLS_ACUITY_SCORE: 22
ADLS_ACUITY_SCORE: 20
ADLS_ACUITY_SCORE: 24
ADLS_ACUITY_SCORE: 20
ADLS_ACUITY_SCORE: 22

## 2023-01-11 NOTE — ANESTHESIA POSTPROCEDURE EVALUATION
Patient: Munira Rodriguez    Procedure: Procedure(s):  LEFT REVERSE TOTAL SHOULDER ARTHROPLASTY       Anesthesia Type:  General    Note:  Disposition: Outpatient   Postop Pain Control: Uneventful            Sign Out: Well controlled pain   PONV: No   Neuro/Psych: Uneventful            Sign Out: Acceptable/Baseline neuro status   Airway/Respiratory: Uneventful            Sign Out: Acceptable/Baseline resp. status   CV/Hemodynamics: Uneventful            Sign Out: Acceptable CV status; No obvious hypovolemia; No obvious fluid overload   Other NRE: NONE   DID A NON-ROUTINE EVENT OCCUR?            Last vitals:  Vitals Value Taken Time   /58 01/11/23 1300   Temp 36.5  C (97.7  F) 01/11/23 1240   Pulse 63 01/11/23 1325   Resp 12 01/11/23 1247   SpO2 93 % 01/11/23 1325   Vitals shown include unvalidated device data.    Electronically Signed By: Amada Shah MD  January 11, 2023  1:27 PM

## 2023-01-11 NOTE — ANESTHESIA CARE TRANSFER NOTE
Patient: Munira Rodriguez    Procedure: Procedure(s):  LEFT REVERSE TOTAL SHOULDER ARTHROPLASTY       Diagnosis: Left shoulder pain [M25.512]  Diagnosis Additional Information: No value filed.    Anesthesia Type:   General     Note:    Oropharynx: oropharynx clear of all foreign objects  Level of Consciousness: awake  Oxygen Supplementation: face mask  Level of Supplemental Oxygen (L/min / FiO2): 8  Independent Airway: airway patency satisfactory and stable  Dentition: dentition unchanged  Vital Signs Stable: post-procedure vital signs reviewed and stable  Report to RN Given: handoff report given  Patient transferred to: PACU    Handoff Report: Identifed the Patient, Identified the Reponsible Provider, Reviewed the pertinent medical history, Discussed the surgical course, Reviewed Intra-OP anesthesia mangement and issues during anesthesia, Set expectations for post-procedure period and Allowed opportunity for questions and acknowledgement of understanding      Vitals:  Vitals Value Taken Time   /58 01/11/23 1300   Temp 36.5  C (97.7  F) 01/11/23 1240   Pulse 62 01/11/23 1318   Resp 12 01/11/23 1247   SpO2 93 % 01/11/23 1318   Vitals shown include unvalidated device data.    Electronically Signed By: MANAV Swan CRNA  January 11, 2023  1:20 PM

## 2023-01-11 NOTE — ANESTHESIA PROCEDURE NOTES
Cervical Plexus (superficial) Procedure Note    Pre-Procedure   Staff -        Anesthesiologist:  Amada Ibarra MD       Performed By: anesthesiologist       Location: pre-op       Procedure Start/Stop Times: 1/11/2023 9:04 AM and 1/11/2023 9:05 AM       Pre-Anesthestic Checklist: patient identified, IV checked, site marked, risks and benefits discussed, informed consent, monitors and equipment checked, pre-op evaluation, at physician/surgeon's request and post-op pain management  Timeout:       Correct Patient: Yes        Correct Procedure: Yes        Correct Site: Yes        Correct Position: Yes        Correct Laterality: Yes        Site Marked: Yes  Procedure Documentation  Procedure: Cervical Plexus (superficial)       Laterality: left       Patient Position: sitting       Patient Prep/Sterile Barriers: sterile gloves, mask       Skin prep: Chloraprep       Needle type: Stimuplex.       Needle Gauge: 21.        Needle Length (Inches): 4        Ultrasound guided       1. Ultrasound was used to identify targeted nerve, plexus, vascular marker, or fascial plane and place a needle adjacent to it in real-time.       2. Ultrasound was used to visualize the spread of anesthetic in close proximity to the above referenced structure.       3. A permanent image is entered into the patient's record.       4. The visualized anatomic structures appeared normal.       5. There were no apparent abnormal pathologic findings.    Assessment/Narrative         The placement was negative for: blood aspirated, painful injection and site bleeding       Paresthesias: No.       Bolus given via needle..        Secured via.        Insertion/Infusion Method: Single Shot       Complications: none       Injection made incrementally with aspirations every 5 mL.    Medication(s) Administered   Bupivacaine 0.25% w/ 1:200K Epi (Injection) - Injection   5 mL - 1/11/2023 9:05:00 AM  Medication Administration Time: 1/11/2023 9:04  "AM      FOR [facility] ([facility]) ONLY:   Pain Team Contact information: please page the Pain Team Via [employer]. Search \"Pain\". During daytime hours, please page the attending first. At night please page the resident first.    "

## 2023-01-11 NOTE — CONSULTS
[facility] MEDICINE CONSULT NOTE   Physician requesting consult: Darion Mishra MD    Reason for consult: Postoperative medical management of medical co-morbidities as below      Attending attestation  I have personally and independently evaluated this patient.  I have personally and independently performed physical exam.  I have personally reviewed the documented assessment and plan and discussed with the student.  Personally reviewed preoperative EKG, agree normal sinus rhythm, no evidence of ischemia or atrial enlargement    Key physical exam findings: sensation not yet returned to the left hand, vascularly intact upper extremity.  RRR, lungs CTA B/L, trace LE edema b/l, drowsy but answers questions appropriately, non ill appearing    Key med management notes:  Last VTE was about 4 decades ago, low risk for recurrence.  Resume ARB tomorrow with hold criteria  Holding Sulindac  For edema, recommended compression stockings which she does not use at home  Given Lasix, will check a magnesium, potassium    Sav Rain MD, MPH  Hospitalist  [facility]  Office # [phone]          Assessment and Plan    Munira Rodriguez is a 72 year old old female with a history of hypertension, pulmonary embolism (1972), blood clots, rectocele, GERD underwent left reverse total shoulder arthroplasty. [facility] [place] to evaluate patient for postoperative medical management as follows below. Please resume the home medications as reconciled and further noted below with ordered hold parameters.  Vital signs have been stable post-operatively including hemodynamically stable blood pressure and heart rate. Thank you for this consult; we will continue to follow this patient until discharge.      S/p left reverse shoulder arthoplasty  Hx of pulmonary embolism and blood clots  SCD in place  Postoperative pain control and VTE prophylaxis per orthopedic surgery  Hold  NSAIDs    Hypertension  Currently on losartan and lasix at home.  Monitor BP  Continue losartan tomorrow with hold parameters    Hyperlipidemia  Continue atorvastatin    GERD  On omeprazole and pepcid at home. Pepcid recently added by PCP.  Continue home meds    Procedure(s):  LEFT REVERSE TOTAL SHOULDER ARTHROPLASTY  Post-operative Day: Day of Surgery  Code status:Full Code     Type of Anesthesia   General    Estimated Blood Loss:  * No values recorded between 1/11/2023 10:04 AM and 1/11/2023 11:56 AM *    Hospital Problem List   No problem-specific Assessment & Plan notes found for this encounter.    Active Problems:    * No active hospital problems. *      -Reviewed the patient's preoperative H and P and updated missing elements.  -Home medication reconciliation has been reviewed.  Medications have been ordered as noted from the home list and changes are documented above     HISTORY     Munira Rodriguez is a 72 year old old female with a past medical history of hypertension, hyperlipidemia, lower extremity edema, pulmonary embolism (1972), blood clots, rectocele, GERD who is s/p left reverse total shoulder arthroplasty and open biceps tenodesis on 1/11/2023 with Dr. Darion Mishra. Patient denies any pain postoperatively, as well as nausea, vomiting, chest pain, shortness of breath, or any other complaints.    Patient denies history of sleep apnea. Does have a history of blood clots.     Past Medical History     Patient Active Problem List    Diagnosis Date Noted     Healthcare maintenance 01/21/2022     Priority: Medium     mammo annual  Colon 2017 dye 2022  dexa 2021 normal        Obesity (BMI 35.0-39.9) with comorbidity (H) 02/24/2021     Priority: Medium     Agatston coronary artery calcium score between 200 and 399 01/14/2021     Priority: Medium     Incidental lung nodule, less than or equal to 3mm 01/14/2021     Priority: Medium     Noted 2021 repeat ct in 2022         Leg edema 11/17/2020     Priority:  Medium     Bilateral shoulder pain 11/16/2020     Priority: Medium     Left knee pain 01/02/2020     Priority: Medium     Rectocele 01/01/2019     Priority: Medium     Essential Hypertension      Priority: Medium     Created by Conversion  Replacement Utility updated for latest IMO load         Chronic renal insufficiency, stage 1 04/08/2016     Priority: Medium     Chest Pain      Priority: Medium     Created by Conversion            Surgical History   She  has a past surgical history that includes joint replacement; appendectomy; Arthroscopy knee; and Replacement Total Knee (Left, 1/2/2020).     Past Surgical History:   Procedure Laterality Date     APPENDECTOMY       ARTHROSCOPY KNEE       JOINT REPLACEMENT       REPLACEMENT TOTAL KNEE Left 1/2/2020    Procedure: LEFT TOTAL KNEE ARTHROPLASTY, COMPUTER-ASSIST;  Surgeon: Jb Luevano MD;  Location: [facility];  Service: Orthopedics       Family History    Reviewed, and family history includes Cancer in her father; Prostate Cancer in her paternal grandfather; Skin Cancer in her father.    Social History    Reviewed, and she  reports that she has never smoked. She has never used smokeless tobacco. She reports current alcohol use. She reports that she does not use drugs.  Social History     Tobacco Use     Smoking status: Never     Smokeless tobacco: Never   Substance Use Topics     Alcohol use: Yes     Comment: Alcoholic Drinks/day: Wine 1 glass daily        Allergies   No Known Allergies    Prior to Admission Medications      Medications Prior to Admission   Medication Sig Dispense Refill Last Dose     acetaminophen (TYLENOL) 500 MG tablet Take 500 mg by mouth every 6 hours as needed for mild pain   1/10/2023     atorvastatin (LIPITOR) 20 MG tablet TAKE 1 TABLET (20 MG TOTAL) BY MOUTH DAILY. 90 tablet 3 1/10/2023 at at HS     calcium carbonate (TUMS) 500 MG chewable tablet Take 0.5 chew tab by mouth 2 times daily   1/10/2023     Coenzyme Q-10 60 MG CAPS  Take 1 capsule by mouth daily   1/10/2023     famotidine (PEPCID) 20 MG tablet Take 1 tablet (20 mg) by mouth 2 times daily 34 tablet 1 1/10/2023     furosemide (LASIX) 20 MG tablet TAKE 1 TABLET (20 MG TOTAL) BY MOUTH DAILY. 90 tablet 3 1/10/2023     losartan (COZAAR) 100 MG tablet TAKE 1 TABLET (100 MG TOTAL) BY MOUTH DAILY. 90 tablet 1 1/10/2023     melatonin 10 mg Tab [MELATONIN 10 MG TAB] Take 1 tablet by mouth at bedtime.   1/10/2023     multivitamin therapeutic (THERAGRAN) tablet [MULTIVITAMIN THERAPEUTIC (THERAGRAN) TABLET] Take 1 tablet by mouth daily.   1/10/2023     omeprazole (PRILOSEC) 20 MG DR capsule TAKE ONE CAPSULE ORALLY DAILY 90 capsule 3 1/11/2023     senna (SENOKOT) 8.6 mg tablet Take 1 tablet by mouth 2 times daily   1/10/2023     sulindac (CLINORIL) 200 MG tablet Take 1 tablet (200 mg) by mouth 2 times daily 60 tablet 12 1/1/2023     vitamin D3 (CHOLECALCIFEROL) 50 mcg (2000 units) tablet Take 1 tablet by mouth 2 times daily   1/10/2023       Review of Systems   A 12 point comprehensive review of systems was negative except as noted above.    OBJECTIVE         Physical Exam   Temp:  [97.1  F (36.2  C)-98.1  F (36.7  C)] 98.1  F (36.7  C)  Pulse:  [58-77] 61  Resp:  [13-27] 18  BP: (111-180)/(56-88) 111/60  SpO2:  [87 %-100 %] 97 %  Body mass index is 35.67 kg/m .    Exam:  Constitutional: healthy, alert and no distress  Head: Normocephalic. Atraumatic  Cardiovascular: RRR. No lifts, heaves, or thrills. RRR. No murmurs, clicks gallops or rub,   Respiratory: CTAB, no wheezing, rales, rhonchi noted  Musculoskeletal: No abnormality visualized, PCD inplace  Skin: no suspicious lesions or rashes on visualized skin  Neurologic: Normal sensation to lower extremities bilaterally. CN 2-12 grossly intact  Psychiatric: mentation appears normal and affect normal    Cardiographics Reviewed Personally By Myself   EKG Results: reviewed     Imaging Reviewed Personally By Myself    Radiology Results:   Recent  "Results (from the past 24 hour(s))   POC US Guidance Needle Placement    Narrative    Ultrasound was performed as guidance to an anesthesia procedure.  Click   \"PACS images\" hyperlink below to view any stored images.  For specific   procedure details, view procedure note authored by anesthesia.   XR Shoulder Left Port G/E 2 Views    Narrative    EXAM: XR SHOULDER LEFT PORT G/E 2 VIEWS  LOCATION: [facility]  DATE/TIME: 1/11/2023 1:13 PM    INDICATION: Status post surgery  COMPARISON: None.      Impression    IMPRESSION: Status post left reverse shoulder arthroplasty, in standard alignment. No periprosthetic fracture.       Labs Reviewed Personally By Myself     Results for orders placed or performed during the hospital encounter of 01/11/23 (from the past 24 hour(s))   POC US Guidance Needle Placement    Narrative    Ultrasound was performed as guidance to an anesthesia procedure.  Click   \"PACS images\" hyperlink below to view any stored images.  For specific   procedure details, view procedure note authored by anesthesia.   ABO/Rh type and screen    Narrative    The following orders were created for panel order ABO/Rh type and screen.  Procedure                               Abnormality         Status                     ---------                               -----------         ------                     Adult Type and Screen[[id number]]                            Final result                 Please view results for these tests on the individual orders.   CBC with platelets   Result Value Ref Range    WBC Count 5.4 4.0 - 11.0 10e3/uL    RBC Count 3.84 3.80 - 5.20 10e6/uL    Hemoglobin 11.5 (L) 11.7 - 15.7 g/dL    Hematocrit 36.3 35.0 - 47.0 %    MCV 95 78 - 100 fL    MCH 29.9 26.5 - 33.0 pg    MCHC 31.7 31.5 - 36.5 g/dL    RDW 14.8 10.0 - 15.0 %    Platelet Count 267 150 - 450 10e3/uL   Potassium   Result Value Ref Range    Potassium 3.9 3.5 - 5.0 mmol/L   Creatinine   Result Value Ref " Range    Creatinine 0.71 0.60 - 1.10 mg/dL    GFR Estimate 89 >60 mL/min/1.73m2   INR   Result Value Ref Range    INR 0.98 0.85 - 1.15   Partial thromboplastin time (PTT)   Result Value Ref Range    aPTT 32 22 - 38 Seconds   Adult Type and Screen   Result Value Ref Range    ABO/RH(D) A POS     Antibody Screen Negative Negative    SPECIMEN EXPIRATION DATE [id number]    XR Shoulder Left Port G/E 2 Views    Narrative    EXAM: XR SHOULDER LEFT PORT G/E 2 VIEWS  LOCATION: [facility]  DATE/TIME: 1/11/2023 1:13 PM    INDICATION: Status post surgery  COMPARISON: None.      Impression    IMPRESSION: Status post left reverse shoulder arthroplasty, in standard alignment. No periprosthetic fracture.       Preoperative Labs Reviewed Personally By Myself     Thank you for this consultation.  Appreciate the opportunity to participate in the care of Munira Rodriguez, please feel free to contact us for any questions or concerns.      ABDI Rushing-S2  1/11/2023

## 2023-01-11 NOTE — OP NOTE
Operative Note    Name:  Munira Rodriguez  :  1950  MRN:  [id number]  Procedure Date:  2023    Preoperative Diagnosis:  Left Shoulder DJD and Rotator cuff tear    Postoperative Diagnosis:  Same with significant posterior and superior glenoid wear pattern, intact rotator cuff    Procedures:  1.Left Reverse Total Shoulder Arthroplasty  2.Open biceps tenodesis    Surgeon(s):   Darion Mishra MD    Asst.   Veronica Pope PA-C PA-C assistance was required due to the complexity of the procedure, for patient positioning, instrumentation assistance, soft tissue retraction and patient safety.      Circulator: Wen Palacios RN  Scrub Person: Carl Delacruz RN      Anesthesia:   General and Interscalene block with Exparel     Estimated Blood Loss: 100cc    Condition on discharge from OR:  stable    Drains: none     Specimens: None    Tissue Removed, Not Sent: Humeral head    Complications: none     Disposition:  Stable and awake to postanesthesia recovery..      INDICATION FOR OPERATION:  Munira Rodriguez is a 72 year old female with a history of shoulder pain and stiffness and she has failed nonsurgical treatment. XR showed evidence of severe osteoarthritis of the shoulder. Recommended she undergo shoulder arthroplasty. Risks and benefits of the planned procedure as well as details of surgery were discussed with the patient. Consent was obtained.       REPORT OF OPERATION:   The patient was brought to operating room and placed supine on the operating table. An interscalene block was placed by Anesthesia preoperatively and she was given appropriate preoperative antibiotic. After induction of general anesthesia, she was placed in the beach-chair position on the operating room table. All bony prominences were well padded. The shoulder was prepped and draped in normal sterile fashion.    A standard anterior deltopectoral incision was utilized. Deep retractors were placed below the clavipectoral fascia and  the deltoid. The humeral head was inspected. The rotator cuff was noted to intact with mild tendonopathy of the supraspinatus.  Biceps was tenodesed to the superior aspect of the pectoralis tendon. Subscapularis was released off the lesser tuberosity and tagged.    The humeral head was exposed and noted to have a wear pattern of the anterior and mid humeral head with large anterior and inferior humeral osteophytes, which were resected. Humeral head cut was then performed using appropriate instrumentation. The humerus was then reamed and broached, and a trial stem with a head protection plate was placed.    The glenoid was then exposed. The labrum was excised circumferentially.  Inferior capsule release was performed, taking care to identify and protect the axillary nerve.   The glenoid was noted to have significant posterior and superior wear pattern consistent with preoperative CT imaging.  Next, the glenoid was drilled and reamed and prepared for half wedge glenoid implant. The glenoid baseplate was then impacted and the peripheral screws were placed. A trial glenosphere was placed.   The humeral component was trialed, and then the appropriate glenosphere implant was then placed.  The humerus was again exposed and the humeral implant was impacted into place.  The humeral tray and polyethelene was then again trialed.  Next a standard humeral polyethelene cup and tray was impacted into place.    A final reduction was performed, and the shoulder was copiously irrigated with saline irrigation. All instruments were removed. Subscapularis was repaired back to the lesser tuberosity with six #2 FiberWire sutures..The incison was again copiously irrigated with saline irrigation and 1 gram of vancomycin powder was placed in the shoulder joint and subdeltoid space.  The incision was closed in layers with #1 Ethibond closure of the deltopectoral split, 0 Vicryl and 2-0 Vicryl subcutaneous closure, and skin staples. A sterile  dressing was placed on the shoulder.     Postoperatively she was placed in a shoulder SlingShot brace and  transferred in stable, awake condition to Postanesthesia Recovery.  Following surgery she will be maintained in the sling for 4-6 weeks postoperatively, may discontinue abduction pillow at 1-2 week postoperative and begin PT at approximately 3 weeks postoperatively.        Darion Mishra MD   Date: 1/11/2023  Time: 11:49 AM    Implants:  Implant Name Type Inv. Item Serial No.  Lot No. LRB No. Used Action   GLEOSPHERE LATERALIZED STANDARD 36MM - [id number] Total Joint Component/Insert GLEOSPHERE LATERALIZED STANDARD 36MM [id number] [employer]  Left 1 Implanted   BASEPLATE LATERAL RVRS 25MM 35D HLF WDG AUG [id number] - [id number] Total Joint Component/Insert BASEPLATE LATERAL RVRS 25MM 35D HLF WD AUG [id number] eReceipts [facility]  Left 1 Implanted   SCREW CENTRAL 6.5X30MM [id number] - [id number] Metallic Hardware/[place] SCREW CENTRAL 6.5X30MM [id number]  [employer] NA Left 1 Implanted   SCREW PERIPHERAL 14MM [id number] - [id number] Metallic Hardware/[place] SCREW PERIPHERAL 14MM [id number]  [employer] NA Left 2 Implanted   SCREW PERIPHERAL 22MM - [id number] Metallic Hardware/[place] SCREW PERIPHERAL 22MM  [employer] NA Left 1 Implanted   SCREW PERIPHERAL 5.0X30MM [id number] - [id number] Metallic Hardware/[place] SCREW PERIPHERAL 5.0X30MM [id number]  [employer] NA Left 1 Implanted   STEM HUMERAL ANATOMIC STD PTC 3B - [id number] Total Joint Component/Insert STEM HUMERAL ANATOMIC STD PTC 3B [id number] [employer]  Left 1 Implanted   INSERT REVISION REVERSE +6/12 36MM - [id number] Total Joint Component/Insert INSERT REVISION REVERSE +6/12 36MM [id number] [employer]  Left 1 Implanted   TRAY REVERSE LOW OFFSET +0 [id number] - [id number] Total Joint Component/Insert TRAY REVERSE LOW OFFSET +0 [id number] [id number]  Left 1 Implanted

## 2023-01-11 NOTE — ANESTHESIA PROCEDURE NOTES
Airway       Patient location during procedure: OR  Staff -        CRNA: Rolando Robles APRN CRNA       Performed By: CRNA  Consent for Airway        Urgency: elective  Indications and Patient Condition       Indications for airway management: erika-procedural       Induction type:intravenous       Mask difficulty assessment: 1 - vent by mask    Final Airway Details       Final airway type: endotracheal airway       Successful airway: ETT - single  Endotracheal Airway Details        ETT size (mm): 7.0       Cuffed: yes       Successful intubation technique: direct laryngoscopy       DL Blade Type: Delgado 2       Grade View of Cords: 1       Adjucts: stylet       Position: Right       Measured from: lips       Secured at (cm): 20    Post intubation assessment        Placement verified by: capnometry and equal breath sounds        Number of attempts at approach: 1       Number of other approaches attempted: 0       Secured with: silk tape       Ease of procedure: easy       Dentition: Intact and Unchanged       Dental guard used and removed.

## 2023-01-11 NOTE — ANESTHESIA PROCEDURE NOTES
Interscalene Procedure Note    Pre-Procedure   Staff -        Anesthesiologist:  Amada Ibarra MD       Performed By: anesthesiologist       Location: pre-op       Procedure Start/Stop Times: 1/11/2023 9:05 AM and 1/11/2023 9:09 AM       Pre-Anesthestic Checklist: patient identified, IV checked, site marked, risks and benefits discussed, informed consent, monitors and equipment checked, pre-op evaluation, at physician/surgeon's request and post-op pain management  Timeout:       Correct Patient: Yes        Correct Procedure: Yes        Correct Site: Yes        Correct Position: Yes        Correct Laterality: Yes        Site Marked: Yes  Procedure Documentation  Procedure: Interscalene       Laterality: left       Patient Position: sitting       Patient Prep/Sterile Barriers: sterile gloves, mask       Skin prep: Chloraprep       Needle type: Stimuplex.       Needle Gauge: 21.        Needle Length (Inches): 4        Ultrasound guided       1. Ultrasound was used to identify targeted nerve, plexus, vascular marker, or fascial plane and place a needle adjacent to it in real-time.       2. Ultrasound was used to visualize the spread of anesthetic in close proximity to the above referenced structure.       3. A permanent image is entered into the patient's record.       4. The visualized anatomic structures appeared normal.       5. There were no apparent abnormal pathologic findings.    Assessment/Narrative         The placement was negative for: blood aspirated, painful injection and site bleeding       Paresthesias: No.       Bolus given via needle..        Secured via.        Insertion/Infusion Method: Single Shot       Complications: none       Injection made incrementally with aspirations every 5 mL.    Medication(s) Administered   Bupivacaine 0.25% w/ 1:200K Epi (Injection) - Injection   10 mL - 1/11/2023 9:05:00 AM  Bupivacaine liposome (Exparel) 1.3% LA inj susp (Infiltration) - Infiltration   10 mL -  "1/11/2023 9:05:00 AM  Medication Administration Time: 1/11/2023 9:05 AM      FOR [facility] ([facility]) ONLY:   Pain Team Contact information: please page the Pain Team Via [email or web address]. Search \"Pain\". During daytime hours, please page the attending first. At night please page the resident first.    "

## 2023-01-11 NOTE — INTERVAL H&P NOTE
"I have reviewed the surgical (or preoperative) H&P that is linked to this encounter, and examined the patient. There are no significant changes    Clinical Conditions Present on Arrival:  Clinically Significant Risk Factors Present on Admission                    # Obesity: Estimated body mass index is 35.67 kg/m  as calculated from the following:    Height as of this encounter: 1.676 m (5' 6\").    Weight as of this encounter: 100.2 kg (221 lb).       "

## 2023-01-11 NOTE — ANESTHESIA PREPROCEDURE EVALUATION
"Anesthesia Pre-Procedure Evaluation    Patient: Munira Rodriguez   MRN: [id number] : 1950        Procedure : Procedure(s):  LEFT REVERSE TOTAL SHOULDER ARTHROPLASTY          Past Medical History:   Diagnosis Date     Acid reflux      Arthritis      History of blood clots     Spontaneous \"thrombophlebitis\" treated with Coumadin for 3 months     History of pulmonary embolus (PE)     Associated with Dalkon shield     Hypertension      Psoriasis      Rectocele      Thrombophlebitis       Past Surgical History:   Procedure Laterality Date     APPENDECTOMY       ARTHROSCOPY KNEE       JOINT REPLACEMENT       REPLACEMENT TOTAL KNEE Left 2020    Procedure: LEFT TOTAL KNEE ARTHROPLASTY, COMPUTER-ASSIST;  Surgeon: Jb Luevano MD;  Location: [facility];  Service: Orthopedics      No Known Allergies   Social History     Tobacco Use     Smoking status: Never     Smokeless tobacco: Never   Substance Use Topics     Alcohol use: Yes     Comment: Alcoholic Drinks/day: Wine 1 glass daily       Wt Readings from Last 1 Encounters:   22 99.8 kg (220 lb)        Anesthesia Evaluation   Pt has had prior anesthetic.     No history of anesthetic complications       ROS/MED HX  ENT/Pulmonary:  - neg pulmonary ROS  (-) sleep apnea   Neurologic:  - neg neurologic ROS     Cardiovascular:     (+) hypertension--CAD ( Agatston coronary artery calcium score between 200 and 399) --- (-) taking anticoagulants/antiplatelets, CHF and dyslipidemia   METS/Exercise Tolerance:     Hematologic:       Musculoskeletal:   (+) arthritis (s/f L reverse TSA),     GI/Hepatic:     (+) GERD, Asymptomatic on medication,     Renal/Genitourinary:     (+) renal disease (stage 1), type: CRI,     Endo:     (+) Obesity,     Psychiatric/Substance Use:  - neg psychiatric ROS     Infectious Disease:  - neg infectious disease ROS     Malignancy:  - neg malignancy ROS     Other:            Physical Exam    Airway        Mallampati: " II   TM distance: > 3 FB   Neck ROM: full   Mouth opening: > 3 cm    Respiratory Devices and Support         Dental  no notable dental history         Cardiovascular   cardiovascular exam normal          Pulmonary   pulmonary exam normal                OUTSIDE LABS:  CBC:   Lab Results   Component Value Date    WBC 5.7 12/14/2022    WBC 5.0 01/21/2022    HGB 11.6 (L) 12/14/2022    HGB 11.3 (L) 01/21/2022    HCT 36.7 12/14/2022    HCT 37.9 01/21/2022     12/14/2022     01/21/2022     BMP:   Lab Results   Component Value Date     12/14/2022     01/21/2022    POTASSIUM 4.3 12/14/2022    POTASSIUM 3.8 01/21/2022    CHLORIDE 107 12/14/2022    CHLORIDE 103 01/21/2022    CO2 29 12/14/2022    CO2 27 01/21/2022    BUN 21.3 12/14/2022    BUN 19 01/21/2022    CR 0.75 12/14/2022    CR 0.76 01/21/2022    GLC 99 12/14/2022    GLC 90 01/21/2022     COAGS:   Lab Results   Component Value Date    INR 0.99 01/02/2020     POC: No results found for: BGM, HCG, HCGS  HEPATIC:   Lab Results   Component Value Date    ALBUMIN 3.8 01/21/2022    PROTTOTAL 7.3 01/21/2022    ALT 19 01/21/2022    AST 22 01/21/2022    ALKPHOS 81 01/21/2022    BILITOTAL 0.4 01/21/2022     OTHER:   Lab Results   Component Value Date    A1C 5.6 01/21/2022    RAINA 9.0 12/14/2022    TSH 1.94 01/21/2022    SED 25 (H) 09/23/2019       Anesthesia Plan    ASA Status:  2   NPO Status:  NPO Appropriate    Anesthesia Type: General.     - Airway: ETT   Induction: Intravenous, Propofol.   Maintenance: Inhalation.        Consents    Anesthesia Plan(s) and associated risks, benefits, and realistic alternatives discussed. Questions answered and patient/representative(s) expressed understanding.    - Discussed:     - Discussed with:  Patient         Postoperative Care    Pain management: Peripheral nerve block (Single Shot), IV analgesics.   PONV prophylaxis: Ondansetron (or other 5HT-3), Dexamethasone or Solumedrol     Comments:    Other Comments:  GETA  Preop IS block with exparel per surgeon request for post op pain  Phenylephrine gtt to maintain MAP in beach chair position  Decadron 10, Zofran 4            Amada Shah MD

## 2023-01-11 NOTE — PHARMACY-ADMISSION MEDICATION HISTORY
Pharmacist completed medication history with the patient while in the [facility].  Prior to admission (PTA) med list completed and updated in the electronic medical record (EMR).  Pharmacy Note - Admission Medication History  Pertinent Provider Information: none   ______________________________________________________________________  Prior To Admission (PTA) med list completed and updated in EMR.     Medications Prior to Admission   Medication Sig Dispense Refill Last Dose     acetaminophen (TYLENOL) 500 MG tablet Take 500 mg by mouth every 6 hours as needed for mild pain   1/10/2023     atorvastatin (LIPITOR) 20 MG tablet TAKE 1 TABLET (20 MG TOTAL) BY MOUTH DAILY. 90 tablet 3 1/10/2023 at at [facility]     calcium carbonate (TUMS) 500 MG chewable tablet Take 0.5 chew tab by mouth 2 times daily   1/10/2023     Coenzyme Q-10 60 MG CAPS Take 1 capsule by mouth daily   1/10/2023     famotidine (PEPCID) 20 MG tablet Take 1 tablet (20 mg) by mouth 2 times daily 34 tablet 1 1/10/2023     furosemide (LASIX) 20 MG tablet TAKE 1 TABLET (20 MG TOTAL) BY MOUTH DAILY. 90 tablet 3 1/10/2023     losartan (COZAAR) 100 MG tablet TAKE 1 TABLET (100 MG TOTAL) BY MOUTH DAILY. 90 tablet 1 1/10/2023     melatonin 10 mg Tab [MELATONIN 10 MG TAB] Take 1 tablet by mouth at bedtime.   1/10/2023     multivitamin therapeutic (THERAGRAN) tablet [MULTIVITAMIN THERAPEUTIC (THERAGRAN) TABLET] Take 1 tablet by mouth daily.   1/10/2023     omeprazole (PRILOSEC) 20 MG DR capsule TAKE ONE CAPSULE ORALLY DAILY 90 capsule 3 1/11/2023     senna (SENOKOT) 8.6 mg tablet Take 1 tablet by mouth 2 times daily   1/10/2023     sulindac (CLINORIL) 200 MG tablet Take 1 tablet (200 mg) by mouth 2 times daily 60 tablet 12 1/1/2023     vitamin D3 (CHOLECALCIFEROL) 50 mcg (2000 units) tablet Take 1 tablet by mouth 2 times daily   1/10/2023         Information source(s): Patient and CareEverywhere/SureScripts  Patient was asked about OTC/herbal products specifically.  PTA med  list reflects this.  Based on the pharmacist s assessment, the PTA med list information appears reliable  Allergies were reviewed, assessed, and updated with the patient.    Medications available for use during hospital stay: none  Thank you for the opportunity to participate in the care of this patient.    The patient was asked about OTC/herbal products specifically and the PTA med list reflects the patient's response.    Allergies were reviewed and assessed with the patient, responses were updated in the EMR.    Thank you for the opportunity to participate in the care of this patient.    Joe Gibson RPH 1/11/2023 8:12 AM

## 2023-01-12 ENCOUNTER — APPOINTMENT (OUTPATIENT)
Dept: OCCUPATIONAL THERAPY | Facility: CLINIC | Age: 73
End: 2023-01-12
Attending: ORTHOPAEDIC SURGERY
Payer: MEDICARE

## 2023-01-12 VITALS
WEIGHT: 221 LBS | BODY MASS INDEX: 35.52 KG/M2 | SYSTOLIC BLOOD PRESSURE: 129 MMHG | DIASTOLIC BLOOD PRESSURE: 70 MMHG | OXYGEN SATURATION: 90 % | TEMPERATURE: 97.5 F | RESPIRATION RATE: 18 BRPM | HEART RATE: 64 BPM | HEIGHT: 66 IN

## 2023-01-12 LAB
FASTING STATUS PATIENT QL REPORTED: YES
GLUCOSE BLD-MCNC: 108 MG/DL (ref 70–125)
HGB BLD-MCNC: 9.7 G/DL (ref 11.7–15.7)
MAGNESIUM SERPL-MCNC: 2 MG/DL (ref 1.8–2.6)
POTASSIUM BLD-SCNC: 4.3 MMOL/L (ref 3.5–5)

## 2023-01-12 PROCEDURE — 250N000013 HC RX MED GY IP 250 OP 250 PS 637: Performed by: PHYSICIAN ASSISTANT

## 2023-01-12 PROCEDURE — 99232 SBSQ HOSP IP/OBS MODERATE 35: CPT | Mod: GC | Performed by: HOSPITALIST

## 2023-01-12 PROCEDURE — 97166 OT EVAL MOD COMPLEX 45 MIN: CPT | Mod: GO

## 2023-01-12 PROCEDURE — 85018 HEMOGLOBIN: CPT | Performed by: PHYSICIAN ASSISTANT

## 2023-01-12 PROCEDURE — 97110 THERAPEUTIC EXERCISES: CPT | Mod: GO

## 2023-01-12 PROCEDURE — 36415 COLL VENOUS BLD VENIPUNCTURE: CPT | Performed by: ORTHOPAEDIC SURGERY

## 2023-01-12 PROCEDURE — 250N000013 HC RX MED GY IP 250 OP 250 PS 637: Performed by: HOSPITALIST

## 2023-01-12 PROCEDURE — 82947 ASSAY GLUCOSE BLOOD QUANT: CPT | Performed by: ORTHOPAEDIC SURGERY

## 2023-01-12 PROCEDURE — 97535 SELF CARE MNGMENT TRAINING: CPT | Mod: GO

## 2023-01-12 PROCEDURE — 99207 PR CDG-CUT & PASTE-POTENTIAL IMPACT ON LEVEL: CPT | Mod: GC | Performed by: HOSPITALIST

## 2023-01-12 PROCEDURE — 83735 ASSAY OF MAGNESIUM: CPT | Performed by: HOSPITALIST

## 2023-01-12 PROCEDURE — 84132 ASSAY OF SERUM POTASSIUM: CPT | Performed by: HOSPITALIST

## 2023-01-12 PROCEDURE — 250N000011 HC RX IP 250 OP 636: Performed by: PHYSICIAN ASSISTANT

## 2023-01-12 RX ADMIN — CEFAZOLIN SODIUM 2 G: 2 INJECTION, SOLUTION INTRAVENOUS at 00:38

## 2023-01-12 RX ADMIN — SENNOSIDES AND DOCUSATE SODIUM 1 TABLET: 50; 8.6 TABLET ORAL at 08:08

## 2023-01-12 RX ADMIN — FUROSEMIDE 20 MG: 20 TABLET ORAL at 08:07

## 2023-01-12 RX ADMIN — LOSARTAN POTASSIUM 100 MG: 50 TABLET, FILM COATED ORAL at 08:07

## 2023-01-12 RX ADMIN — ASPIRIN 325 MG: 325 TABLET, COATED ORAL at 08:08

## 2023-01-12 RX ADMIN — OXYCODONE HYDROCHLORIDE 5 MG: 5 TABLET ORAL at 08:09

## 2023-01-12 RX ADMIN — OXYCODONE HYDROCHLORIDE 5 MG: 5 TABLET ORAL at 02:51

## 2023-01-12 RX ADMIN — POLYETHYLENE GLYCOL 3350 17 G: 17 POWDER, FOR SOLUTION ORAL at 09:11

## 2023-01-12 RX ADMIN — ACETAMINOPHEN 975 MG: 325 TABLET ORAL at 04:52

## 2023-01-12 RX ADMIN — PANTOPRAZOLE SODIUM 40 MG: 20 TABLET, DELAYED RELEASE ORAL at 06:33

## 2023-01-12 RX ADMIN — FAMOTIDINE 20 MG: 20 TABLET ORAL at 08:07

## 2023-01-12 ASSESSMENT — ACTIVITIES OF DAILY LIVING (ADL)
ADLS_ACUITY_SCORE: 24
ADLS_ACUITY_SCORE: 22

## 2023-01-12 NOTE — PLAN OF CARE
Problem: Shoulder Arthroplasty (Total, Tutu, Reverse)  Goal: Acceptable Pain Control  Outcome: Progressing  Intervention: Prevent or Manage Pain  Recent Flowsheet Documentation  Taken 1/11/2023 2050 by Yanet Solorio RN  Pain Management Interventions: medication (see MAR)   Ice applied to shoulder to help manage pain. PRN oxy given x 1 for pain 8/10.   Problem: Shoulder Arthroplasty (Total, Tutu, Reverse)  Goal: Anesthesia/Sedation Recovery  Outcome: Adequate for Care Transition  Intervention: Optimize Anesthesia Recovery  Recent Flowsheet Documentation  Taken 1/11/2023 2102 by Yanet Solorio, RN  Safety Promotion/Fall Prevention:   activity supervised   assistive device/personal items within reach   lighting adjusted   nonskid shoes/slippers when out of bed   mobility aid in reach   patient and family education   safety round/check completed  Taken 1/11/2023 1740 by Yanet Solorio RN  Safety Promotion/Fall Prevention:   activity supervised   assistive device/personal items within reach   lighting adjusted   nonskid shoes/slippers when out of bed   mobility aid in reach   patient and family education   safety round/check completed  Taken 1/11/2023 1540 by Yanet Solorio, RN  Safety Promotion/Fall Prevention:   activity supervised   assistive device/personal items within reach   lighting adjusted   nonskid shoes/slippers when out of bed   mobility aid in reach   patient and family education   safety round/check completed  Goal: Nausea and Vomiting Relief  Outcome: Adequate for Care Transition  Goal: Effective Urinary Elimination  Outcome: Adequate for Care Transition  Pt eating, drinking and voiding. IV cefazolin given.   Problem: Shoulder Arthroplasty (Total, Tutu, Reverse)  Goal: Anesthesia/Sedation Recovery  Outcome: Adequate for Care Transition  Intervention: Optimize Anesthesia Recovery  Recent Flowsheet Documentation  Taken 1/11/2023 2102 by Yanet Solorio RN  Safety Promotion/Fall  Prevention:   activity supervised   assistive device/personal items within reach   lighting adjusted   nonskid shoes/slippers when out of bed   mobility aid in reach   patient and family education   safety round/check completed  Taken 1/11/2023 1740 by Yanet Solorio, RN  Safety Promotion/Fall Prevention:   activity supervised   assistive device/personal items within reach   lighting adjusted   nonskid shoes/slippers when out of bed   mobility aid in reach   patient and family education   safety round/check completed  Taken 1/11/2023 1540 by Yanet Solorio, RN  Safety Promotion/Fall Prevention:   activity supervised   assistive device/personal items within reach   lighting adjusted   nonskid shoes/slippers when out of bed   mobility aid in reach   patient and family education   safety round/check completed  Goal: Nausea and Vomiting Relief  Outcome: Adequate for Care Transition  Goal: Effective Urinary Elimination  Outcome: Adequate for Care Transition

## 2023-01-12 NOTE — PLAN OF CARE
Goal Outcome Evaluation:  Patient vital signs are at baseline: Yes  Patient able to ambulate as they were prior to admission or with assist devices provided by therapies during their stay:  Yes  Patient MUST void prior to discharge:  Yes  Patient able to tolerate oral intake:  Yes  Pain has adequate pain control using Oral analgesics:  Yes  Does patient have an identified :  Yes  Has goal D/C date and time been discussed with patient:  Yes    Patient is alert and oriented x 4. Pain controlled with scheduled and PRN pain medications. Ambulated to the bathroom with SBA. Voiding spontaneously. IV saline locked.

## 2023-01-12 NOTE — PROGRESS NOTES
01/12/23 0833   Appointment Info   Signing Clinician's Name / Credentials (OT) SCARLETT Aguila   Living Environment   People in Home spouse   Current Living Arrangements condominium   Home Accessibility stairs within home   Number of Stairs, Within Home, Primary greater than 10 stairs   Stair Railings, Within Home, Primary railings safe and in good condition;railing on right side (ascending)   Transportation Anticipated family or friend will provide   Living Environment Comments Pt can stay on 1 level if needed. No ADs. Pt has walkin shower, higher toilets   Self-Care   Usual Activity Tolerance good   Current Activity Tolerance good   Equipment Currently Used at Home none   Fall history within last six months yes   Number of times patient has fallen within last six months 1   Activity/Exercise/Self-Care Comment Pt IND w/ ADLs and IADLs at baseline   General Information   Onset of Illness/Injury or Date of Surgery 01/11/23   Referring Physician Darion Mishra MD   Patient/Family Therapy Goal Statement (OT) go home   Additional Occupational Profile Info/Pertinent History of Current Problem TSA   Existing Precautions/Restrictions shoulder   Left Upper Extremity (Weight-bearing Status) (S)  non weight-bearing (NWB)   Cognitive Status Examination   Orientation Status orientation to person, place and time   Visual Perception   Visual Impairment/Limitations corrective lenses full-time   Sensory   Sensory Comments numbness in LUE post op   Pain Assessment   Patient Currently in Pain No   Posture   Posture not impaired   Range of Motion Comprehensive   Comment, General Range of Motion no AROM of L shoulder post op 2' to numbness   Strength Comprehensive (MMT)   General Manual Muscle Testing (MMT) Assessment no strength deficits identified   Muscle Tone Assessment   Muscle Tone Quick Adds No deficits were identified   Coordination   Upper Extremity Coordination No deficits were identified   Bed Mobility   Bed Mobility  rolling left;supine-sit   Comment (Bed Mobility) SBA for bed mobility   Transfers   Transfers toilet transfer;shower transfer   Transfer Comments SBA-CGa for transfers   Activities of Daily Living   BADL Assessment/Intervention upper body dressing;lower body dressing;toileting   Upper Body Dressing Assessment/Training   [place] Level (Upper Body Dressing) minimum assist (75% patient effort)   Lower Body Dressing Assessment/Training   [place] Level (Lower Body Dressing) minimum assist (75% patient effort)   Toileting   [place] Level (Toileting) supervision   Clinical Impression   Criteria for Skilled Therapeutic Interventions Met (OT) Yes, treatment indicated   OT Diagnosis decreased ADLs   Influenced by the following impairments TSA   OT Problem List-Impairments impacting ADL range of motion (ROM);sensation;post-surgical precautions   Assessment of Occupational Performance 3-5 Performance Deficits   Identified Performance Deficits dressing, bed mobility, transfers   Planned Therapy Interventions (OT) ADL retraining;bed mobility training;ROM;transfer training   Clinical Decision Making Complexity (OT) moderate complexity   Risk & Benefits of therapy have been explained evaluation/treatment results reviewed;patient   OT Total Evaluation Time   OT Eval, Moderate Complexity Minutes (38203) 10   OT Goals   Therapy Frequency (OT) One time eval and treatment   OT Predicted Duration/Target Date for Goal Attainment 01/12/23   OT Goals Upper Body Dressing;Lower Body Dressing;Bed Mobility;Toilet Transfer/Toileting   OT: Upper Body Dressing Modified independent;within precautions;Goal Met;Completed   OT: Lower Body Dressing within precautions;Goal Met;Completed;Modified independent   OT: Bed Mobility Modified independent;supine to/from sitting;rolling;bridging;within precautions;Goal Met;Completed   OT: Toilet Transfer/Toileting Independent;toilet transfer;cleaning and garment management;within precautions;Goal  Met;Completed   Interventions   Interventions Quick Adds Self-Care/Home Management;Therapeutic Procedures/Exercise   Self-Care/Home Management   Self-Care/Home Mgmt/ADL, Compensatory, Meal Prep Minutes (00996) 14   Symptoms Noted During/After Treatment (Meal Preparation/Planning Training) none   Treatment Detail/Skilled Intervention Pt edu on shoulder px - pt verbalized understanding  and followed during session. Pt edu on FB dressing including donning/doffing immobilizer - completed Mod I w/ 2 cues after teaching. Edu handout given for compensatory strategies for UB dressing. Pt instructed on bed mobility techniques - completed Mod I. STS IND and amb. 150 ftx2, no AD and no LOB. Pt edu on safe toilet/walkin shower transfers -completed Mod I. Pt edu on safe navigation of stairs - completed 5 Mod I using railing.    Therapeutic Procedures/Exercise   Therapeutic Procedure: strength, endurance, ROM, flexibillity minutes (74926) 10   Symptoms Noted During/After Treatment other (see comments)  (numbness in LUE)   Treatment Detail/Skilled Intervention Pt given edu handout on pendulum/UE exercises for LUE. Pt instructed on and completed 1x10 reps of pendelums, elbow flex/ext, supination/pronation, wrist flex/ext, ulnar/radial deviation, and fist pumps. Pt IND w/ HEP after initial cueing. Pt completed exercises using RUE due to numbness in LUE. Instructed to start exercises at home once sensation returns to LUE.   OT Discharge Planning   OT Plan DC OT   OT Discharge Recommendation (DC Rec) (S)  home with assist   OT Rationale for DC Rec Pt tolerating therapy well. Pt will have assistance w/ ADLs and IADLs as needed from spouse. Pt has all necessary DME.   OT Brief overview of current status Mod I w/ ADLs   Total Session Time   Timed Code Treatment Minutes 24   Total Session Time (sum of timed and untimed services) 34

## 2023-01-12 NOTE — PROGRESS NOTES
Care Management Discharge Note    Discharge Date: 01/12/2023       Discharge Disposition: Home    Discharge Services: None    Discharge DME: None    Discharge Transportation: family or friend will provide    Private pay costs discussed: Not applicable    PAS Confirmation Code:  (N/A)  Patient/family educated on Medicare website which has current facility and service quality ratings: no    Education Provided on the Discharge Plan:  No  Persons Notified of Discharge Plans: Yes  Patient/Family in Agreement with the Plan: yes    Handoff Referral Completed: No    Additional Information:  Chart Reviewed: Pt lives with  in a house. No CM needs identified. Family to transport.        Monica Barron

## 2023-01-12 NOTE — PROGRESS NOTES
[facility] PROGRESS NOTE:  Date: 1/31/2022    SUBJECTIVE:   Patient seen and examined.   Patient saturating well on 2lt. No acute events. Less confused.       CURRENT MEDICATIONS:    Current Facility-Administered Medications   Medication Dose Route Frequency Provider Last Rate Last Admin   • escitalopram (LEXAPRO) tablet 5 mg  5 mg Oral Daily Mason Wray MD       • dexamethasone (PF) (DECADRON) injection 6 mg  6 mg Intravenous Daily Endi Jaimes MD   6 mg at 01/31/22 0924   • famotidine (PEPCID) tablet 20 mg  20 mg Oral 2 times per day Enid Jaimes MD   20 mg at 01/31/22 0924   • remdesivir (VEKLURY) 100 mg in sodium chloride 0.9 % 250 mL total volume infusion  100 mg Intravenous Daily at noon Enid Jaimes  mL/hr at 01/31/22 1245 100 mg at 01/31/22 1245   • enoxaparin (LOVENOX) injection 40 mg  40 mg Subcutaneous Q24H Enid Jaimes MD   40 mg at 01/31/22 0924   • cefTRIAXone (ROCEPHIN) syringe 2,000 mg  2,000 mg Intravenous Daily Erlin Timmons DO   2,000 mg at 01/31/22 0924   • cholecalciferol (VITAMIN D) tablet 125 mcg  125 mcg Oral Daily Sameera Cedeno RD   125 mcg at 01/30/22 2135   • [Held by provider] metoPROLOL tartrate (LOPRESSOR) tablet 12.5 mg  12.5 mg Oral 2 times per day Erlin Timmons DO   12.5 mg at 01/29/22 2120   • sodium chloride 0.9 % flush bag 25 mL  25 mL Intravenous PRN Britany Whitaker CNP   Completed at 01/30/22 1502   • sodium chloride (PF) 0.9 % injection 2 mL  2 mL Intracatheter 2 times per day Britany Whitaker CNP   2 mL at 01/31/22 0924   • zinc sulfate (ZINCATE) capsule 220 mg  220 mg Oral Daily with breakfast Britany Whitaker CNP   220 mg at 01/30/22 2134   • ascorbic acid (VITAMIN C) tablet 500 mg  500 mg Oral Daily Britany Whitaker CNP   500 mg at 01/30/22 2134   • acetaminophen (TYLENOL) tablet 650 mg  650 mg Oral Q6H PRN Britany Whitaker CNP   650 mg at 01/28/22 1820   • benzonatate (TESSALON PERLES) capsule 100 mg  100 mg Oral TID PRN Britany Whitaker CNP       •  [facility]      Attending attestation:  I have personally seen and evaluated this patient.  I have personally performed physical exam.  I have personally reviewed the note from the student and discussed this patient with the student.  Key physical exam:  Still has some sensory decrease left hand that will be addressed in follow-up with orthopedics, suspect this is due to anesthesia.  Left upper extremity is vascularly intact.  Nonpallorous  BP was reasonably within hospital goal  She did have drop in hemoglobin just less than 2.8.  She is not having presyncopal symptoms.  I addressed multiple sources of potential blood loss, all of which she denies.  I advised patient to monitor her stools for melena or hematochezia.  Patient verbalized understanding  I agreed with appropriateness of discharge today    Sav Rain MD, MPH  Hospitalist  [facility]  Office # [phone]        Medicine Progress Note - Hospitalist Service    Date of Admission:  1/11/2023    Assessment & Plan   Munira Rodriguez is a 72 year old old female with a history of hypertension, pulmonary embolism (1972), blood clots, rectocele, GERD underwent left reverse total shoulder arthroplasty. [facility] [place] asked to evaluate patient for postoperative medical management as follows below. Please resume the home medications as reconciled and further noted below with ordered hold parameters.  Vital signs have been stable post-operatively including hemodynamically stable blood pressure and heart rate. Thank you for this consult; we will continue to follow this patient until discharge.        S/p left reverse shoulder arthoplasty  Hx of pulmonary embolism and blood clots  SCD in place  Postoperative pain control and VTE prophylaxis per orthopedic surgery  Hold NSAIDs     Normocytic anemia  Noted downtrend in Hgb to 9.7 1/12 from 11.5. No characteristic source of bleeding. No melena or hematochezia.   Patient counseled  "to monitor stools for bleeding    Hypertension  Currently on losartan and lasix at home.  Monitor BP  Resume losartan with hold parameters     Hyperlipidemia  Continue atorvastatin     GERD  On omeprazole and pepcid at home. Pepcid recently added by PCP.  Continue home meds     Procedure(s):  LEFT REVERSE TOTAL SHOULDER ARTHROPLASTY  Post-operative Day: Day of Surgery  Code status:Full Code      Type of Anesthesia   General     Estimated Blood Loss:  * No values recorded between 1/11/2023 10:04 AM and 1/11/2023 11:56 AM *       Diet: Discharge Instruction - Regular Diet Adult  Regular Diet Adult    DVT Prophylaxis: ASA, Pneumatic Compression Devices, Anti-embolisim stockings (TEDs) and Ambulate every shift  Day Catheter: Not present  Lines: None     Cardiac Monitoring: None  Code Status: Full Code      Clinically Significant Risk Factors Present on Admission                  # Hypertension: home medication list includes antihypertensive(s)      # Obesity: Estimated body mass index is 35.67 kg/m  as calculated from the following:    Height as of this encounter: 1.676 m (5' 6\").    Weight as of this encounter: 100.2 kg (221 lb).           Disposition Plan      Expected Discharge Date: 01/12/2023, 12:00 PM                  [facility]-S[facility]  1/12/2023      Hospitalist Service  [facility]  Securely message with [employer] (more info)  Text page via [employer] Paging/Directory   ______________________________________________________________________    Interval History   Patient did not sleep very well last night after being moved to the shared hospital room, though denies any chest pain, shortness of breath, cough, leg pain, fevers, chills. Her shoulder and part of her arm are still numb. Does have sensation in her left hand. Counseled that she should begin to regain feeling post-op and will be followed up on during her surgery follow up.     Counseling also given regarding drop in Hgb, no " guaifenesin 100 MG/5ML solution 200 mg  200 mg Oral Q4H PRN Britany Whitaker CNP       • ondansetron (ZOFRAN) injection 4 mg  4 mg Intravenous Q6H PRN Britany Whitaker CNP            HOME MEDICATIONS:  No medications prior to admission.          OBJECTIVE:    VITAL SIGNS:     Vital Last Value 24 Hour Range   Temperature 97 °F (36.1 °C) (01/31/22 1321) Temp  Min: 97 °F (36.1 °C)  Max: 97.7 °F (36.5 °C)   Pulse (!) 56 (01/31/22 1321) Pulse  Min: 56  Max: 65   Respiratory 20 (01/31/22 1321) Resp  Min: 17  Max: 20   Non-Invasive  Blood Pressure 115/66 (01/31/22 1321) BP  Min: 108/67  Max: 128/72   Pulse Oximetry 96 % (01/31/22 1321) SpO2  Min: 93 %  Max: 96 %     Vital Today Admitted   Weight 79.2 kg (174 lb 10 oz) (01/25/22 1732) Weight: 79.4 kg (175 lb) (01/25/22 1105)   Height N/A Height: 5' 10\" (177.8 cm) (01/25/22 1105)   BMI N/A BMI (Calculated): 25.11 (01/25/22 1105)       INTAKE/OUTPUT:      Intake/Output Summary (Last 24 hours) at 1/31/2022 1621  Last data filed at 1/31/2022 0000  Gross per 24 hour   Intake 320.8 ml   Output --   Net 320.8 ml         PHYSICAL EXAM:    Physical Exam  Vitals reviewed.   Constitutional:       General: He is not in acute distress.  HENT:      Head: Normocephalic.   Cardiovascular:      Rate and Rhythm: Normal rate.   Pulmonary:      Effort: No respiratory distress.      Breath sounds: No wheezing.      Comments: Diminished  Abdominal:      General: There is no distension.      Palpations: Abdomen is soft.      Tenderness: There is no abdominal tenderness.   Musculoskeletal:      Right lower leg: No edema.      Left lower leg: No edema.   Skin:     General: Skin is warm.   Neurological:      Mental Status: He is alert.      Comments: Mental status much improved today, more oriented          LABORATORY DATA:    Recent Labs   Lab 01/31/22  0528 01/30/22  0458 01/29/22  0449 01/28/22  0452 01/27/22  0455 01/26/22  0447 01/25/22  1144   WBC 5.3 2.5* 3.3*   < > 1.9*   < >  --    HCT 33.9*  "apparent signs of bleeding. Denies melena or hematochezia. She will monitor her stools for signs of bleeding.    Physical Exam   Vital Signs: Temp: 97.5  F (36.4  C) Temp src: Oral BP: 129/70 Pulse: 64   Resp: 18 SpO2: 90 % O2 Device: None (Room air) Oxygen Delivery: 2 LPM  Weight: 221 lbs 0 oz    Exam:  Constitutional: healthy, alert and no distress  Head: Normocephalic. Atraumatic  Cardiovascular: RRR. No lifts, heaves, or thrills. RRR. No murmurs, clicks gallops or rub,   Respiratory: CTAB, no wheezing, rales, rhonchi noted  Musculoskeletal: No abnormality visualized, PCD inplace  Skin: no suspicious lesions or rashes on visualized skin  Neurologic: Numbness to left shoulder, intact in hand. CN 2-12 grossly intact  Psychiatric: mentation appears normal and affect normal    Medical Decision Making             Data   Radiology Results:       Recent Results (from the past 24 hour(s))   POC US Guidance Needle Placement     Narrative     Ultrasound was performed as guidance to an anesthesia procedure.  Click   \"PACS images\" hyperlink below to view any stored images.  For specific   procedure details, view procedure note authored by anesthesia.   XR Shoulder Left Port G/E 2 Views     Narrative     EXAM: XR SHOULDER LEFT PORT G/E 2 VIEWS  LOCATION: [facility] [place]  DATE/TIME: 1/11/2023 1:13 PM     INDICATION: Status post surgery  COMPARISON: None.        Impression     IMPRESSION: Status post left reverse shoulder arthroplasty, in standard alignment. No periprosthetic fracture.        Most Recent 3 CBC's:  Recent Labs   Lab Test 01/12/23 0714 01/11/23  0815 12/14/22  1244 01/21/22  0940   WBC  --  5.4 5.7 5.0   HGB 9.7* 11.5* 11.6* 11.3*   MCV  --  95 95 90   PLT  --  267 238 301     Most Recent 3 BMP's:  Recent Labs   Lab Test 01/12/23 0714 01/11/23  0815 12/14/22  1244 01/21/22  0940 12/18/20  1027   NA  --   --  143 141 141   POTASSIUM 4.3 3.9 4.3 3.8 4.0   CHLORIDE  --   --  107 103 102   CO2  " 33.9* 33.4*   < > 33.2*   < >  --    HGB 11.8* 11.5* 11.7*   < > 11.1*   < >  --     121* 101*   < > 77*   < >  --    INR  --   --   --   --   --   --  1.0   PTT  --   --   --   --   --   --  33*   SODIUM 141 141 140   < > 141   < >  --    POTASSIUM 3.5 3.7 3.9   < > 4.0   < >  --    CHLORIDE 108* 106 105   < > 107   < >  --    CO2 24 24 24   < > 24   < >  --    CALCIUM 8.1* 7.8* 7.9*   < > 7.5*   < >  --    GLUCOSE 153* 148* 101*   < > 94   < >  --    BUN 30* 23* 20   < > 22*   < >  --    CREATININE 0.77 0.78 0.95   < > 1.20*   < >  --    AST 44* 52*  --   --  43*   < >  --    GPT 34 29  --   --  21   < >  --    ALKPT 41* 39*  --   --  35*   < >  --    BILIRUBIN 0.5 0.6  --   --  0.6   < >  --    ALBUMIN 2.4* 2.4*  --   --  2.7*   < >  --     < > = values in this interval not displayed.        IMAGING STUDIES:    XR CHEST PA OR AP 1 VIEW   Final Result       Portable semiupright AP view the chest.  Limited inspiration.         Normal heart size.  No mediastinal widening or adenopathy.  No gross   effusions.  No cephalization of vessels.         Ill-defined opacities suggestive of airspace opacities bilateral upper and   lower lung fields with worsening compared to prior study.  Atypical   multifocal pneumonia is possible.  Other etiologies not excluded.         Electronically Signed by: JAC TODD M.D.    Signed on: 1/29/2022 11:36 AM          MRI BRAIN WO CONTRAST   Final Result      No mass effect, acute infarct, fluid collection or hydrocephalus.      ILUCAS MD, have reviewed the images and report and concur with   these findings interpreted by Jovanny Banegas.      Electronically Signed by: LUCAS CASTRO MD    Signed on: 1/26/2022 4:53 PM          CT HEAD WO CONTRAST   Final Result   No acute intracranial process.      Electronically Signed by: LUCAS CASTRO MD    Signed on: 1/25/2022 1:01 PM          XR CHEST PA OR AP 1 VIEW   Final Result   FINDINGS AND IMPRESSION:       Heart size is  --   --  29 27 31   BUN  --   --  21.3 19 27   CR  --  0.71 0.75 0.76 0.79   ANIONGAP  --   --  7 11 8   RAINA  --   --  9.0 9.8 9.6     --  99 90 87     Most Recent 3 INR's:  Recent Labs   Lab Test 01/11/23  0815 01/02/20  0810   INR 0.98 0.99     Most Recent Hemoglobin A1c:  Recent Labs   Lab Test 01/21/22  0940   A1C 5.6     Most Recent 6 glucoses:  Recent Labs   Lab Test 01/12/23  0714 12/14/22  1244 01/21/22  0940 12/18/20  1027 01/03/20  0614 12/23/19  1544    99 90 87 109 111        normal.  Tortuous elongated aorta.  Ill-defined confluent   reticular patchy interstitial and airspace opacities with basilar   predominance suggestive of multifocal pneumonitis/atypical pneumonia.    Follow-up advised.  No pleural effusion or pneumothorax.         Electronically Signed by: JACKI CHAVIRA M.D.    Signed on: 1/25/2022 1:06 PM                ASSESSMENT/PLAN:    Salmonella bacteremia with sepsis  - TTE (01/27) revealed an EF of 50% , no vegetations  - Blood Cx rapid identification detected Salmonella species (01/26)  - Blood Cx (2/2) with no growth, final (1/31)  - Rpt Blood Cx x2 NGTD - final results pending  - Antibiotic coverage with IV Rocephin x4 to 6 weeks, discontinued azithromycin per ID  - ID (Dr. Julian) following      Weakness with acute metabolic toxic encephalopathy more likely secondary to current sepsis with Salmonella bacteremia vs covid  No diagnosis of dementia  - MRI Brain (01/25) found no mass effect, acute infarct, fluid collection or hydrocephalus.  - B12 elevated and Folate wnl (01/25)   - TSH wnl (01/25)   - Urine Tox (01/26) was unremarkable   - RPR nonreactive on (01/26)   - Respiratory support via prn Tessalon Perles and guaifenesin   - Supplementing with zinc sulfate, Vitamin C, and Vitamin D  - Contact and droplet precautions in place   - PT/OT to work with pt as tolerated. Recommend daily skilled therapy      Sinus bradycardia with transient paroxsymal Atrial fibrillation  - HR 50s-60s  - TTE (01/27) revealed an EF of 50% and mild aortic valve mild regurgitation   - EKG (01/26) revealed atrial fibrillation and left anterior fascicular block   - Holding metoprolol  - Cardiology (Dr. Cardona) following- plan for ziopatch o discharge, hold off on AC as transient     Acute hypoxic respiratory failure due to COVID-19 pneumonia, now symptomatic  - Oxygenating on RA. Patient desaturated to 80% on 2L while walking  - Outpt Covid swab positive 3 weeks ago [facility] Covid  swab positive on (01/21)  - CXR (1/25) revealed opacities suggestive multifocal pneumonitis/atypical pna, repeat chest x-ray yesterday noted findings worsened compared to the study  - COVID-19 positive on 1/21/2022  - Continue IV remdesivir (Day 3)  - Steroid therapy with IV Decadron     Depressive Disorder  - Continue Lexapro 5 mg per Psych  - Psychiatry (Dr. Wray) following     Pancytopenia likely 2/2 above salmonella and covid  - WBC 2.5 -> wnl, pt afebrile  - RBC 3.83 -> 3.91, Plt 121 -> wnl  - HgB 11.5 -> 11.8, no e/o active bleed  - Monitor labs, treatment as above     Mild Elevated AST  - LFT's: AST 52 -> 44; rest wnl   - Avoiding hepatotoxins as able       Mild Acute kidney injury - resolved    Code Status:   Code Status: Full Resuscitation    DVT PPX: Lovenox sq and SCDs    DISPOSITION:  Pending clinical improvement in above. Continue IV antibiotics.     PCP:  No Pcp       Charting performed by arnav Venegas for Dr. Pallavi Prado    All medical record entries made by the arnav were at my direction. I have reviewed the chart and agree that the record accurately reflects my personal performance of the history, physical exam, hospital course, and assessment and plan.

## 2023-01-12 NOTE — DISCHARGE SUMMARY
ORTHOPEDIC DISCHARGE SUMMARY       Munira Rodriguez,  1950, MRN [id number]    Admission Date: 2023  Admission Diagnoses: Left shoulder pain [M25.512]  Status post shoulder surgery [Z98.890]     Discharge Date:      Post-operative Day:  1 Day Post-Op    Reason for Admission: The patient was admitted for the following:  Procedure(s):  LEFT REVERSE TOTAL SHOULDER ARTHROPLASTY      BRIEF HOSPITAL COURSE   This 72 year old female underwent the aforementioned procedure with Dr. Mishra on 2023. There were no intraoperative complications and the patient was transferred to the recovery room and later the orthopedic unit in stable condition. Once the patient reached the orthopedic floor our orthopedic pain protocol was implemented along with the following:    Anticoagulation Medications: ASA  Therapy: OT  Activity: NWB  Bracing: Sling    Consultations during Admission: Hospitalist service for medical management     COMPLICATIONS/SIGNIFICANT FINDINGS    none    DISCHARGE INFORMATION   Condition at discharge: Good  Discharge destination: Home  Patient was seen by myself on the date of discharge.    FOLLOW UP CARE   Follow up with orthopedics in 2 weeks or sooner should the need arise. Ortho will continue to manage pain control, post op anticoagulation and incision care.     Follow up with your PCP for management of chronic medical problems and to evaluate for post op medical complications including constipation, nausea/vomiting, DVT/PE, anemia, changes in blood pressure, fevers/chills, urinary retention and atelectasis/pneumonia.     Subjective   Patient is doing well today. Patient has passed her therapies. She is using Tylenol for pain which she is tolerating well. She feels ready to discharge home. No other complaints. All questions answered.       Physical Exam   The patient is Alert and awake. Patient is laying in bed and appears comfortable.   Sensation is intact.  AIN, median, and motor nerve intact.  "  Wrist ROM and  strength is intact.  Radial pulse intact.  The incision is covered. Dressing C/D/I. Sling in place.     /70 (BP Location: Right arm)   Pulse 64   Temp 97.5  F (36.4  C) (Oral)   Resp 18   Ht 1.676 m (5' 6\")   Wt 100.2 kg (221 lb)   SpO2 90%   BMI 35.67 kg/m      Pertinent Results at Discharge     Hemoglobin   Date/Time Value Ref Range Status   01/12/2023 07:14 AM 9.7 (L) 11.7 - 15.7 g/dL Final   01/11/2023 08:15 AM 11.5 (L) 11.7 - 15.7 g/dL Final   12/14/2022 12:44 PM 11.6 (L) 11.7 - 15.7 g/dL Final     INR   Date/Time Value Ref Range Status   01/11/2023 08:15 AM 0.98 0.85 - 1.15 Final   01/02/2020 08:10 AM 0.99 0.90 - 1.10 Final     Platelet Count   Date/Time Value Ref Range Status   01/11/2023 08:15  150 - 450 10e3/uL Final   12/14/2022 12:44  150 - 450 10e3/uL Final   01/21/2022 09:40  150 - 450 10e3/uL Final       Problem List   Active Problems:    * No active hospital problems. *        Soraya Dan PA-C  Date: 1/12/2023  Time: 8:44 AM    "

## 2023-01-12 NOTE — PROGRESS NOTES
"Orthopedic Progress Note      Assessment: 1 Day Post-Op  S/P Procedure(s):  LEFT REVERSE TOTAL SHOULDER ARTHROPLASTY     Plan:   - Continue PT/OT  - Weightbearing status: NWB LUE  - Anticoagulation:  PO BID in addition to SCDs, mell stockings and early ambulation.  - Dressing: Keep dry and intact  - Pain Management; continue current regimen  - Follow-up: Outpatient follow up in 2 weeks; maintain in sling for 4-6 weeks postoperatively, may begin therapy at 3 weeks post op  - Disposition: Discharge home today pending therapy progression        Subjective:  Pain: moderate  Nausea, Vomiting:  No  Lightheadedness, Dizziness:  No  Neuro:  Patient denies new onset numbness or paresthesias  Fever, chills: No  Chest pain: No  SOB: No    Patient reports feeling well today. Patient reports pain is tolerable with current pain regimen, reports still feels the affects of nerve block. Patient eating and drinking well. Patient voiding and passing gas however no BM. All questions/concerns answered.      Objective:  /70 (BP Location: Right arm)   Pulse 64   Temp 97.5  F (36.4  C) (Oral)   Resp 18   Ht 1.676 m (5' 6\")   Wt 100.2 kg (221 lb)   SpO2 90%   BMI 35.67 kg/m    The patient is Alert and awake. Patient is laying in bed and appears comfortable.   Sensation is intact.  AIN, median, and motor nerve intact.   Wrist ROM and  strength is intact.  Radial pulse intact.  The incision is covered. Dressing C/D/I. Sling in place.       Pertinent Labs   Lab Results: personally reviewed.   Lab Results   Component Value Date    INR 0.98 01/11/2023    INR 0.99 01/02/2020     Lab Results   Component Value Date    WBC 5.4 01/11/2023    HGB 9.7 (L) 01/12/2023    HCT 36.3 01/11/2023    MCV 95 01/11/2023     01/11/2023     Lab Results   Component Value Date     12/14/2022    CO2 29 12/14/2022         Report completed by:  Soraya Dan PA-C, JENNY  Date: 1/12/2023  Time: 8:39 AM  [facility]  "

## 2023-01-23 ENCOUNTER — TRANSFERRED RECORDS (OUTPATIENT)
Dept: HEALTH INFORMATION MANAGEMENT | Facility: CLINIC | Age: 73
End: 2023-01-23

## 2023-01-26 DIAGNOSIS — M15.0 PRIMARY OSTEOARTHRITIS INVOLVING MULTIPLE JOINTS: ICD-10-CM

## 2023-01-26 DIAGNOSIS — E78.2 MIXED HYPERLIPIDEMIA: ICD-10-CM

## 2023-01-27 RX ORDER — SULINDAC 200 MG/1
200 TABLET ORAL 2 TIMES DAILY
Qty: 60 TABLET | Refills: 11 | Status: SHIPPED | OUTPATIENT
Start: 2023-01-27 | End: 2023-04-05

## 2023-01-27 RX ORDER — FUROSEMIDE 20 MG
TABLET ORAL
Qty: 90 TABLET | Refills: 2 | Status: SHIPPED | OUTPATIENT
Start: 2023-01-27 | End: 2023-10-25

## 2023-01-27 NOTE — TELEPHONE ENCOUNTER
"Routing refill request to provider for review/approval because:  Labs not current:  Alt, na    Last Written Prescription Date:  1/21/22  Last Fill Quantity: 60,  # refills: 12   Last office visit provider:  12/14/22     Requested Prescriptions   Pending Prescriptions Disp Refills     furosemide (LASIX) 20 MG tablet [Pharmacy Med Name: FUROSEMIDE 20MG] 90 tablet 2     Sig: TAKE 1 TABLET (20 MG TOTAL) BY MOUTH DAILY.       Diuretics (Including Combos) Protocol Passed - 1/26/2023  4:13 PM        Passed - Blood pressure under 140/90 in past 12 months     BP Readings from Last 3 Encounters:   01/12/23 129/70   12/14/22 136/82   01/21/22 122/64                 Passed - Recent (12 mo) or future (30 days) visit within the authorizing provider's specialty     Patient has had an office visit with the authorizing provider or a provider within the authorizing providers department within the previous 12 mos or has a future within next 30 days. See \"Patient Info\" tab in inbasket, or \"Choose Columns\" in Meds & Orders section of the refill encounter.              Passed - Medication is active on med list        Passed - Patient is age 18 or older        Passed - No active pregancy on record        Passed - Normal serum creatinine on file in past 12 months     Recent Labs   Lab Test 01/11/23  0815   CR 0.71              Passed - Normal serum potassium on file in past 12 months     Recent Labs   Lab Test 01/12/23  0714   POTASSIUM 4.3                    Passed - Normal serum sodium on file in past 12 months     Recent Labs   Lab Test 12/14/22  1244                 Passed - No positive pregnancy test in past 12 months           sulindac (CLINORIL) 200 MG tablet [Pharmacy Med Name: SULINDAC 200MG] 60 tablet 11     Sig: TAKE 1 TABLET (200 MG) BY MOUTH 2 TIMES DAILY       NSAID Medications Failed - 1/26/2023  4:13 PM        Failed - Normal ALT on file in past 12 months     Recent Labs   Lab Test 01/21/22  0940   ALT 19             " "Failed - Normal AST on file in past 12 months     Recent Labs   Lab Test 01/21/22  0940   AST 22             Failed - Patient is age 6-64 years        Failed - Normal CBC on file in past 12 months     Recent Labs   Lab Test 01/12/23  0714 01/11/23  0815   WBC  --  5.4   RBC  --  3.84   HGB 9.7* 11.5*   HCT  --  36.3   PLT  --  267                 Passed - Blood pressure under 140/90 in past 12 months     BP Readings from Last 3 Encounters:   01/12/23 129/70   12/14/22 136/82   01/21/22 122/64                 Passed - Recent (12 mo) or future (30 days) visit within the authorizing provider's specialty     Patient has had an office visit with the authorizing provider or a provider within the authorizing providers department within the previous 12 mos or has a future within next 30 days. See \"Patient Info\" tab in inbasket, or \"Choose Columns\" in Meds & Orders section of the refill encounter.              Passed - Medication is active on med list        Passed - No active pregnancy on record        Passed - Normal serum creatinine on file in past 12 months     Recent Labs   Lab Test 01/11/23  0815   CR 0.71       Ok to refill medication if creatinine is low          Passed - No positive pregnancy test in past 12 months             Tracy Noguera RN 01/27/23 3:11 PM  "

## 2023-02-24 DIAGNOSIS — R07.89 CHEST PRESSURE: ICD-10-CM

## 2023-02-26 RX ORDER — LOSARTAN POTASSIUM 100 MG/1
TABLET ORAL
Qty: 90 TABLET | Refills: 2 | Status: SHIPPED | OUTPATIENT
Start: 2023-02-26 | End: 2023-11-21

## 2023-02-26 NOTE — TELEPHONE ENCOUNTER
"Last Written Prescription Date:  08/29/2022  Last Fill Quantity: 90,  # refills: 1   Last office visit provider:  12/14/2022     Requested Prescriptions   Pending Prescriptions Disp Refills     losartan (COZAAR) 100 MG tablet [Pharmacy Med Name: LOSARTAN POT 100MG] 90 tablet 0     Sig: TAKE 1 TABLET (100 MG TOTAL) BY MOUTH DAILY.       Angiotensin-II Receptors Passed - 2/26/2023  2:34 PM        Passed - Last blood pressure under 140/90 in past 12 months     BP Readings from Last 3 Encounters:   01/12/23 129/70   12/14/22 136/82   01/21/22 122/64                 Passed - Recent (12 mo) or future (30 days) visit within the authorizing provider's specialty     Patient has had an office visit with the authorizing provider or a provider within the authorizing providers department within the previous 12 mos or has a future within next 30 days. See \"Patient Info\" tab in inbasket, or \"Choose Columns\" in Meds & Orders section of the refill encounter.              Passed - Medication is active on med list        Passed - Patient is age 18 or older        Passed - No active pregnancy on record        Passed - Normal serum creatinine on file in past 12 months     Recent Labs   Lab Test 01/11/23  0815   CR 0.71       Ok to refill medication if creatinine is low          Passed - Normal serum potassium on file in past 12 months     Recent Labs   Lab Test 01/12/23  0714   POTASSIUM 4.3                    Passed - No positive pregnancy test in past 12 months             Dariusz Brush RN 02/26/23 2:43 PM  "

## 2023-02-27 ENCOUNTER — TRANSFERRED RECORDS (OUTPATIENT)
Dept: HEALTH INFORMATION MANAGEMENT | Facility: CLINIC | Age: 73
End: 2023-02-27
Payer: COMMERCIAL

## 2023-04-03 DIAGNOSIS — I25.10 ASCVD (ARTERIOSCLEROTIC CARDIOVASCULAR DISEASE): ICD-10-CM

## 2023-04-04 RX ORDER — ATORVASTATIN CALCIUM 20 MG/1
TABLET, FILM COATED ORAL
Qty: 90 TABLET | Refills: 2 | Status: SHIPPED | OUTPATIENT
Start: 2023-04-04 | End: 2023-12-29

## 2023-04-04 NOTE — TELEPHONE ENCOUNTER
"Routing refill request to provider for review/approval because:  Labs not current:      Last Written Prescription Date:  4/15/22  Last Fill Quantity: 90,  # refills: 3   Last office visit provider:  12/14/22     Requested Prescriptions   Pending Prescriptions Disp Refills     atorvastatin (LIPITOR) 20 MG tablet [Pharmacy Med Name: ATORVASTATIN 20MG] 90 tablet 2     Sig: TAKE 1 TABLET (20 MG TOTAL) BY MOUTH DAILY.       Statins Protocol Failed - 4/3/2023  1:06 PM        Failed - LDL on file in past 12 months     Recent Labs   Lab Test 01/21/22  0940   LDL 69             Passed - No abnormal creatine kinase in past 12 months     No lab results found.             Passed - Recent (12 mo) or future (30 days) visit within the authorizing provider's specialty     Patient has had an office visit with the authorizing provider or a provider within the authorizing providers department within the previous 12 mos or has a future within next 30 days. See \"Patient Info\" tab in inbasket, or \"Choose Columns\" in Meds & Orders section of the refill encounter.              Passed - Medication is active on med list        Passed - Patient is age 18 or older        Passed - No active pregnancy on record        Passed - No positive pregnancy test in past 12 months             Leatha Diallo RN 04/03/23 10:38 PM  "

## 2023-04-05 ENCOUNTER — OFFICE VISIT (OUTPATIENT)
Dept: INTERNAL MEDICINE | Facility: CLINIC | Age: 73
End: 2023-04-05
Payer: COMMERCIAL

## 2023-04-05 VITALS
HEART RATE: 78 BPM | BODY MASS INDEX: 35.69 KG/M2 | RESPIRATION RATE: 16 BRPM | TEMPERATURE: 98 F | OXYGEN SATURATION: 100 % | HEIGHT: 66 IN | WEIGHT: 222.1 LBS | SYSTOLIC BLOOD PRESSURE: 112 MMHG | DIASTOLIC BLOOD PRESSURE: 68 MMHG

## 2023-04-05 DIAGNOSIS — R79.9 ABNORMAL FINDING OF BLOOD CHEMISTRY, UNSPECIFIED: ICD-10-CM

## 2023-04-05 DIAGNOSIS — I10 ESSENTIAL HYPERTENSION: ICD-10-CM

## 2023-04-05 DIAGNOSIS — Z00.00 HEALTHCARE MAINTENANCE: ICD-10-CM

## 2023-04-05 DIAGNOSIS — R91.1 INCIDENTAL LUNG NODULE, LESS THAN OR EQUAL TO 3MM: Primary | ICD-10-CM

## 2023-04-05 DIAGNOSIS — N18.1 CHRONIC RENAL INSUFFICIENCY, STAGE 1: ICD-10-CM

## 2023-04-05 DIAGNOSIS — Z12.31 ENCOUNTER FOR SCREENING MAMMOGRAM FOR BREAST CANCER: ICD-10-CM

## 2023-04-05 DIAGNOSIS — R93.1 AGATSTON CORONARY ARTERY CALCIUM SCORE BETWEEN 200 AND 399: ICD-10-CM

## 2023-04-05 LAB
ALBUMIN SERPL BCG-MCNC: 4.4 G/DL (ref 3.5–5.2)
ALBUMIN UR-MCNC: NEGATIVE MG/DL
ALP SERPL-CCNC: 104 U/L (ref 35–104)
ALT SERPL W P-5'-P-CCNC: 29 U/L (ref 10–35)
ANION GAP SERPL CALCULATED.3IONS-SCNC: 12 MMOL/L (ref 7–15)
APPEARANCE UR: CLEAR
AST SERPL W P-5'-P-CCNC: 27 U/L (ref 10–35)
BACTERIA #/AREA URNS HPF: ABNORMAL /HPF
BILIRUB SERPL-MCNC: 0.6 MG/DL
BILIRUB UR QL STRIP: NEGATIVE
BUN SERPL-MCNC: 25 MG/DL (ref 8–23)
CALCIUM SERPL-MCNC: 10.1 MG/DL (ref 8.8–10.2)
CHLORIDE SERPL-SCNC: 102 MMOL/L (ref 98–107)
CHOLEST SERPL-MCNC: 159 MG/DL
COLOR UR AUTO: YELLOW
CREAT SERPL-MCNC: 0.75 MG/DL (ref 0.51–0.95)
CREAT UR-MCNC: 86.5 MG/DL
DEPRECATED HCO3 PLAS-SCNC: 28 MMOL/L (ref 22–29)
ERYTHROCYTE [DISTWIDTH] IN BLOOD BY AUTOMATED COUNT: 14.3 % (ref 10–15)
GFR SERPL CREATININE-BSD FRML MDRD: 84 ML/MIN/1.73M2
GLUCOSE SERPL-MCNC: 96 MG/DL (ref 70–99)
GLUCOSE UR STRIP-MCNC: NEGATIVE MG/DL
HBA1C MFR BLD: 5.7 % (ref 0–5.6)
HCT VFR BLD AUTO: 41 % (ref 35–47)
HDLC SERPL-MCNC: 84 MG/DL
HGB BLD-MCNC: 12.6 G/DL (ref 11.7–15.7)
HGB UR QL STRIP: NEGATIVE
KETONES UR STRIP-MCNC: NEGATIVE MG/DL
LDLC SERPL CALC-MCNC: 57 MG/DL
LEUKOCYTE ESTERASE UR QL STRIP: ABNORMAL
MCH RBC QN AUTO: 28.6 PG (ref 26.5–33)
MCHC RBC AUTO-ENTMCNC: 30.7 G/DL (ref 31.5–36.5)
MCV RBC AUTO: 93 FL (ref 78–100)
MICROALBUMIN UR-MCNC: <12 MG/L
MICROALBUMIN/CREAT UR: NORMAL MG/G{CREAT}
NITRATE UR QL: NEGATIVE
NONHDLC SERPL-MCNC: 75 MG/DL
PH UR STRIP: 6 [PH] (ref 5–8)
PLATELET # BLD AUTO: 250 10E3/UL (ref 150–450)
POTASSIUM SERPL-SCNC: 4.1 MMOL/L (ref 3.4–5.3)
PROT SERPL-MCNC: 7.6 G/DL (ref 6.4–8.3)
RBC # BLD AUTO: 4.41 10E6/UL (ref 3.8–5.2)
RBC #/AREA URNS AUTO: ABNORMAL /HPF
SODIUM SERPL-SCNC: 142 MMOL/L (ref 136–145)
SP GR UR STRIP: 1.02 (ref 1–1.03)
SQUAMOUS #/AREA URNS AUTO: ABNORMAL /LPF
TRIGL SERPL-MCNC: 88 MG/DL
TSH SERPL DL<=0.005 MIU/L-ACNC: 1.95 UIU/ML (ref 0.3–4.2)
UROBILINOGEN UR STRIP-ACNC: 0.2 E.U./DL
WBC # BLD AUTO: 6.4 10E3/UL (ref 4–11)
WBC #/AREA URNS AUTO: ABNORMAL /HPF

## 2023-04-05 PROCEDURE — 82570 ASSAY OF URINE CREATININE: CPT | Performed by: INTERNAL MEDICINE

## 2023-04-05 PROCEDURE — 80061 LIPID PANEL: CPT | Performed by: INTERNAL MEDICINE

## 2023-04-05 PROCEDURE — 84443 ASSAY THYROID STIM HORMONE: CPT | Performed by: INTERNAL MEDICINE

## 2023-04-05 PROCEDURE — 36415 COLL VENOUS BLD VENIPUNCTURE: CPT | Performed by: INTERNAL MEDICINE

## 2023-04-05 PROCEDURE — G0439 PPPS, SUBSEQ VISIT: HCPCS | Performed by: INTERNAL MEDICINE

## 2023-04-05 PROCEDURE — 85027 COMPLETE CBC AUTOMATED: CPT | Performed by: INTERNAL MEDICINE

## 2023-04-05 PROCEDURE — 80053 COMPREHEN METABOLIC PANEL: CPT | Performed by: INTERNAL MEDICINE

## 2023-04-05 PROCEDURE — 81001 URINALYSIS AUTO W/SCOPE: CPT | Performed by: INTERNAL MEDICINE

## 2023-04-05 PROCEDURE — 99214 OFFICE O/P EST MOD 30 MIN: CPT | Mod: 25 | Performed by: INTERNAL MEDICINE

## 2023-04-05 PROCEDURE — 83036 HEMOGLOBIN GLYCOSYLATED A1C: CPT | Performed by: INTERNAL MEDICINE

## 2023-04-05 PROCEDURE — 82043 UR ALBUMIN QUANTITATIVE: CPT | Performed by: INTERNAL MEDICINE

## 2023-04-05 RX ORDER — SULINDAC 200 MG/1
200 TABLET ORAL
COMMUNITY
Start: 2021-06-23 | End: 2024-02-07

## 2023-04-05 ASSESSMENT — ENCOUNTER SYMPTOMS
ARTHRALGIAS: 1
CONSTIPATION: 0
NERVOUS/ANXIOUS: 0
COUGH: 0
EYE PAIN: 0
JOINT SWELLING: 0
ABDOMINAL PAIN: 0
DIZZINESS: 0
HEARTBURN: 1
FREQUENCY: 0
HEMATOCHEZIA: 0
PALPITATIONS: 0
MYALGIAS: 0
BREAST MASS: 0
WEAKNESS: 0
SORE THROAT: 1
DYSURIA: 0
SHORTNESS OF BREATH: 0
NAUSEA: 0
PARESTHESIAS: 0
HEMATURIA: 0
DIARRHEA: 0
HEADACHES: 0
CHILLS: 0
FEVER: 0

## 2023-04-05 ASSESSMENT — ACTIVITIES OF DAILY LIVING (ADL): CURRENT_FUNCTION: NO ASSISTANCE NEEDED

## 2023-04-05 NOTE — PROGRESS NOTES
"SUBJECTIVE:   Munira is a 72 year very pleasant patient old who presents for Preventive Visit.  She is doing well recovering well.  Left shoulder repair and successful       4/5/2023     3:08 PM   Additional Questions   Roomed by anjelica   Accompanied by alone         4/5/2023     3:08 PM   Patient Reported Additional Medications   Patient reports taking the following new medications no   Patient has been advised of split billing requirements and indicates understanding: Yes  Are you in the first 12 months of your Medicare coverage?  No    Healthy Habits:     In general, how would you rate your overall health?  Good    Frequency of exercise:  2-3 days/week    Duration of exercise:  15-30 minutes    Do you usually eat at least 4 servings of fruit and vegetables a day, include whole grains    & fiber and avoid regularly eating high fat or \"junk\" foods?  No    Taking medications regularly:  Yes    Medication side effects:  None    Ability to successfully perform activities of daily living:  No assistance needed    Home Safety:  No safety concerns identified    Hearing Impairment:  No hearing concerns    In the past 6 months, have you been bothered by leaking of urine? Yes    In general, how would you rate your overall mental or emotional health?  Good      PHQ-2 Total Score: 1    Additional concerns today:  No      Have you ever done Advance Care Planning? (For example, a Health Directive, POLST, or a discussion with a medical provider or your loved ones about your wishes): Yes, advance care planning is on file.       Fall risk  Fallen 2 or more times in the past year?: No  Any fall with injury in the past year?: No    Six Item Cognitive Impairment Test   (6CIT):      What year is it?                               Correct - 0 points    What month is it?                               Correct - 0 points      Give the patient an address to remember with five components:   Fredi [place] ( first and last name - 2 " components)   [address]  (number and name of street - 2 components)   [place] ( city - 1 component)      About what time is it (within the hour)? Correct - 0 points    Count backwards from 20 to 1:   Correct - 0 points    Say the months of the year in reverse: Correct - 0 points    Repeat the address phrase:   Correct - 0 points    Total 6CIT Score:      0/28    Interpretation: The 6CIT uses an inverse score and questions are weighted to produce a total out of 28. Scores of 0-7 are considered normal and 8 or more significant.    Advantages The test has high sensitivity without compromising specificity even in mild dementia. It is easy to translate linguistically and culturally.  Disadvantages The main disadvantage is in the scoring and weighting of the test, which is initially confusing, however computer models have simplified this greatly.    Probability Statistics: At the 7/8 cut off: Overall figures sensitivity 90% specificity 100%, in mild dementia sensitivity = 78% , specificity = 100%    Copyright 2000 [facility], [facility]. Courtesy of Dr. Jeff Song    Mini-CogTM Copyright S Nita. Licensed by the author for use in [facility]; reprinted with permission (soob@[facility]). All rights reserved.      Do you have sleep apnea, excessive snoring or daytime drowsiness?: no    Reviewed and updated as needed this visit by clinical staff   Tobacco  Allergies  Meds              Reviewed and updated as needed this visit by Provider                 Social History     Tobacco Use     Smoking status: Never     Smokeless tobacco: Never   Vaping Use     Vaping status: Not on file   Substance Use Topics     Alcohol use: Yes     Comment: Alcoholic Drinks/day: Wine 1 glass daily              4/5/2023     2:50 PM   Alcohol Use   Prescreen: >3 drinks/day or >7 drinks/week? No     Do you have a current opioid prescription? No  Do you use any other controlled substances or medications  that are not prescribed by a provider? None              Current providers sharing in care for this patient include:   Patient Care Team:  Marcela Amato MD as PCP - General (Internal Medicine)  Marcela Amato MD as Assigned PCP  Ginny Miller MD as MD (OB/Gyn)    The following health maintenance items are reviewed in Epic and correct as of today:  Health Maintenance   Topic Date Due     MICROALBUMIN  Never done     URINALYSIS  Never done     DTAP/TDAP/TD IMMUNIZATION (1 - Tdap) 01/02/1998     MEDICARE ANNUAL WELLNESS VISIT  01/21/2023     LIPID  01/21/2023     ANNUAL REVIEW OF HM ORDERS  01/21/2023     BMP  12/14/2023     MAMMO SCREENING  01/06/2024     FALL RISK ASSESSMENT  04/05/2024     ADVANCE CARE PLANNING  01/21/2027     COLORECTAL CANCER SCREENING  06/22/2032     DEXA  01/13/2036     HEPATITIS C SCREENING  Completed     PHQ-2 (once per calendar year)  Completed     INFLUENZA VACCINE  Completed     Pneumococcal Vaccine: 65+ Years  Completed     ZOSTER IMMUNIZATION  Completed     COVID-19 Vaccine  Completed     IPV IMMUNIZATION  Aged Out     MENINGITIS IMMUNIZATION  Aged Out       Current Outpatient Medications   Medication     acetaminophen (TYLENOL) 500 MG tablet     atorvastatin (LIPITOR) 20 MG tablet     calcium carbonate (TUMS) 500 MG chewable tablet     Coenzyme Q-10 60 MG CAPS     furosemide (LASIX) 20 MG tablet     losartan (COZAAR) 100 MG tablet     melatonin 10 mg Tab     multivitamin therapeutic (THERAGRAN) tablet     omeprazole (PRILOSEC) 20 MG DR capsule     senna (SENOKOT) 8.6 mg tablet     sulindac (CLINORIL) 200 MG tablet     vitamin D3 (CHOLECALCIFEROL) 50 mcg (2000 units) tablet     No current facility-administered medications for this visit.             Review of Systems   Constitutional: Negative for chills and fever.   HENT: Positive for sore throat. Negative for congestion, ear pain and hearing loss.    Eyes: Negative for pain and visual disturbance.   Respiratory: Negative  "for cough and shortness of breath.    Cardiovascular: Positive for peripheral edema. Negative for chest pain and palpitations.   Gastrointestinal: Positive for heartburn. Negative for abdominal pain, constipation, diarrhea, hematochezia and nausea.   Breasts:  Negative for tenderness, breast mass and discharge.   Genitourinary: Negative for dysuria, frequency, genital sores, hematuria, pelvic pain, urgency, vaginal bleeding and vaginal discharge.   Musculoskeletal: Positive for arthralgias. Negative for joint swelling and myalgias.   Skin: Negative for rash.   Neurological: Negative for dizziness, weakness, headaches and paresthesias.   Psychiatric/Behavioral: Positive for mood changes. The patient is not nervous/anxious.          OBJECTIVE:   /68 (BP Location: Left arm, Patient Position: Sitting, Cuff Size: Adult Large)   Pulse 78   Temp 98  F (36.7  C) (Tympanic)   Resp 16   Ht 1.676 m (5' 6\")   Wt 100.7 kg (222 lb 1.6 oz)   SpO2 100%   BMI 35.85 kg/m   Estimated body mass index is 35.85 kg/m  as calculated from the following:    Height as of this encounter: 1.676 m (5' 6\").    Weight as of this encounter: 100.7 kg (222 lb 1.6 oz).  Physical Exam  GENERAL: healthy, alert and no distress  EYES: Eyes grossly normal to inspection, PERRL and conjunctivae and sclerae normal    NECK: no adenopathy, no asymmetry, masses, or scars and thyroid normal to palpation  RESP: lungs clear to auscultation - no rales, rhonchi or wheezes  BREAST: normal without masses, tenderness or nipple discharge and no palpable axillary masses or adenopathy  CV: regular rate and rhythm, normal S1 S2, no S3 or S4, no murmur, click or rub, no peripheral edema and peripheral pulses strong  ABDOMEN: soft, nontender, no hepatosplenomegaly, no masses and bowel sounds normal  MS: no gross musculoskeletal defects noted, no edema  SKIN: no suspicious lesions or rashes  NEURO: Normal strength and tone, mentation intact and speech " "normal  PSYCH: mentation appears normal, affect normal/bright        ASSESSMENT / PLAN:   (R91.1) Incidental lung nodule, less than or equal to 3mm  (primary encounter diagnosis)  Comment:   Plan:     (N18.1) Chronic renal insufficiency, stage 1  Comment:   Creatinine   Date Value Ref Range Status   01/11/2023 0.71 0.60 - 1.10 mg/dL Final       Plan: Albumin Random Urine Quantitative with Creat         Ratio, UA reflex to Microscopic and Culture         ([id number]), UA Microscopic with Reflex to         Culture            (R93.1) Agatston coronary artery calcium score between 200 and 399  Comment:   Plan: Lipid panel reflex to direct LDL Non-fasting        On statin    (I10) Essential hypertension  Comment: Well-controlled  Plan: CBC with platelets, Comprehensive metabolic         panel, Hemoglobin A1c, TSH            (Z00.00) Healthcare maintenance  Comment:   Plan:     (R79.9) Abnormal finding of blood chemistry, unspecified  Comment:   Plan: Lipid panel reflex to direct LDL Non-fasting,         Hemoglobin A1c            (Z12.31) Encounter for screening mammogram for breast cancer  Comment:   Plan: *MA Screening Digital Bilateral        Behind on her mammogram but otherwise health maintenance is up-to-date    She can get Tdap in the pharmacy        COUNSELING:  Reviewed preventive health counseling, as reflected in patient instructions      BMI:   Estimated body mass index is 35.85 kg/m  as calculated from the following:    Height as of this encounter: 1.676 m (5' 6\").    Weight as of this encounter: 100.7 kg (222 lb 1.6 oz).         She reports that she has never smoked. She has never used smokeless tobacco.      Appropriate preventive services were discussed with this patient, including applicable screening as appropriate for cardiovascular disease, diabetes, osteopenia/osteoporosis, and glaucoma.  As appropriate for age/gender, discussed screening for colorectal cancer, prostate cancer, breast cancer, and " cervical cancer. Checklist reviewing preventive services available has been given to the patient.    Reviewed patients plan of care and provided an AVS. The Basic Care Plan (routine screening as documented in Health Maintenance) for Munira meets the Care Plan requirement. This Care Plan has been established and reviewed with the Patient.          Marcela Amato MD  [facility]    Identified Health Risks:    I have reviewed Opioid Use Disorder and Substance Use Disorder risk factors and made any needed referrals.

## 2023-04-10 ENCOUNTER — TRANSFERRED RECORDS (OUTPATIENT)
Dept: HEALTH INFORMATION MANAGEMENT | Facility: CLINIC | Age: 73
End: 2023-04-10
Payer: COMMERCIAL

## 2023-04-13 ENCOUNTER — HOSPITAL ENCOUNTER (OUTPATIENT)
Dept: MAMMOGRAPHY | Facility: CLINIC | Age: 73
Discharge: HOME | End: 2023-04-13
Attending: INTERNAL MEDICINE | Admitting: INTERNAL MEDICINE
Payer: MEDICARE

## 2023-04-13 DIAGNOSIS — Z12.31 ENCOUNTER FOR SCREENING MAMMOGRAM FOR BREAST CANCER: ICD-10-CM

## 2023-04-13 PROCEDURE — 77067 SCR MAMMO BI INCL CAD: CPT

## 2023-04-24 DIAGNOSIS — R10.13 DYSPEPSIA: ICD-10-CM

## 2023-04-26 NOTE — TELEPHONE ENCOUNTER
"Last Written Prescription Date:  3/18/2022  Last Fill Quantity: 90,  # refills: 3   Last office visit provider:  4/5/2023     Requested Prescriptions   Pending Prescriptions Disp Refills     omeprazole (PRILOSEC) 20 MG DR capsule [Pharmacy Med Name: OMEPRAZOLE 20MG] 90 capsule 2     Sig: TAKE ONE CAPSULE ONCE DAILY       PPI Protocol Passed - 4/24/2023  1:03 PM        Passed - Not on Clopidogrel (unless Pantoprazole ordered)        Passed - No diagnosis of osteoporosis on record        Passed - Recent (12 mo) or future (30 days) visit within the authorizing provider's specialty     Patient has had an office visit with the authorizing provider or a provider within the authorizing providers department within the previous 12 mos or has a future within next 30 days. See \"Patient Info\" tab in inbasket, or \"Choose Columns\" in Meds & Orders section of the refill encounter.              Passed - Medication is active on med list        Passed - Patient is age 18 or older        Passed - No active pregnacy on record        Passed - No positive pregnancy test in past 12 months             Candelaria Schultz RN 04/26/23 1:50 AM  "

## 2023-06-05 ENCOUNTER — TRANSFERRED RECORDS (OUTPATIENT)
Dept: HEALTH INFORMATION MANAGEMENT | Facility: CLINIC | Age: 73
End: 2023-06-05
Payer: COMMERCIAL

## 2023-09-14 ENCOUNTER — TRANSFERRED RECORDS (OUTPATIENT)
Dept: HEALTH INFORMATION MANAGEMENT | Facility: CLINIC | Age: 73
End: 2023-09-14
Payer: COMMERCIAL

## 2023-10-25 DIAGNOSIS — E78.2 MIXED HYPERLIPIDEMIA: ICD-10-CM

## 2023-10-25 RX ORDER — FUROSEMIDE 20 MG
TABLET ORAL
Qty: 90 TABLET | Refills: 1 | Status: SHIPPED | OUTPATIENT
Start: 2023-10-25 | End: 2024-04-12

## 2023-11-21 DIAGNOSIS — R07.89 CHEST PRESSURE: ICD-10-CM

## 2023-11-21 RX ORDER — LOSARTAN POTASSIUM 100 MG/1
TABLET ORAL
Qty: 90 TABLET | Refills: 1 | Status: SHIPPED | OUTPATIENT
Start: 2023-11-21 | End: 2024-05-09

## 2023-12-06 ENCOUNTER — TRANSFERRED RECORDS (OUTPATIENT)
Dept: HEALTH INFORMATION MANAGEMENT | Facility: CLINIC | Age: 73
End: 2023-12-06
Payer: COMMERCIAL

## 2023-12-29 DIAGNOSIS — I25.10 ASCVD (ARTERIOSCLEROTIC CARDIOVASCULAR DISEASE): ICD-10-CM

## 2023-12-29 RX ORDER — ATORVASTATIN CALCIUM 20 MG/1
TABLET, FILM COATED ORAL
Qty: 90 TABLET | Refills: 1 | Status: SHIPPED | OUTPATIENT
Start: 2023-12-29 | End: 2024-06-20

## 2024-02-07 DIAGNOSIS — G89.29 CHRONIC PAIN OF LEFT KNEE: Primary | ICD-10-CM

## 2024-02-07 DIAGNOSIS — M25.562 CHRONIC PAIN OF LEFT KNEE: Primary | ICD-10-CM

## 2024-02-07 RX ORDER — SULINDAC 200 MG/1
200 TABLET ORAL 2 TIMES DAILY
Qty: 60 TABLET | Refills: 10 | Status: SHIPPED | OUTPATIENT
Start: 2024-02-07

## 2024-04-12 DIAGNOSIS — E78.2 MIXED HYPERLIPIDEMIA: ICD-10-CM

## 2024-04-12 RX ORDER — FUROSEMIDE 20 MG
TABLET ORAL
Qty: 90 TABLET | Refills: 0 | Status: SHIPPED | OUTPATIENT
Start: 2024-04-12 | End: 2024-07-11

## 2024-04-24 SDOH — HEALTH STABILITY: PHYSICAL HEALTH: ON AVERAGE, HOW MANY MINUTES DO YOU ENGAGE IN EXERCISE AT THIS LEVEL?: 40 MIN

## 2024-04-24 SDOH — HEALTH STABILITY: PHYSICAL HEALTH: ON AVERAGE, HOW MANY DAYS PER WEEK DO YOU ENGAGE IN MODERATE TO STRENUOUS EXERCISE (LIKE A BRISK WALK)?: 2 DAYS

## 2024-04-24 ASSESSMENT — SOCIAL DETERMINANTS OF HEALTH (SDOH): HOW OFTEN DO YOU GET TOGETHER WITH FRIENDS OR RELATIVES?: MORE THAN THREE TIMES A WEEK

## 2024-04-26 ENCOUNTER — OFFICE VISIT (OUTPATIENT)
Dept: INTERNAL MEDICINE | Facility: CLINIC | Age: 74
End: 2024-04-26
Payer: COMMERCIAL

## 2024-04-26 VITALS
SYSTOLIC BLOOD PRESSURE: 164 MMHG | BODY MASS INDEX: 35.65 KG/M2 | OXYGEN SATURATION: 98 % | HEIGHT: 66 IN | DIASTOLIC BLOOD PRESSURE: 92 MMHG | HEART RATE: 67 BPM | TEMPERATURE: 98.1 F | RESPIRATION RATE: 14 BRPM | WEIGHT: 221.8 LBS

## 2024-04-26 DIAGNOSIS — N18.1 CHRONIC RENAL INSUFFICIENCY, STAGE 1: Primary | ICD-10-CM

## 2024-04-26 DIAGNOSIS — I10 ESSENTIAL HYPERTENSION: ICD-10-CM

## 2024-04-26 DIAGNOSIS — Z00.00 HEALTHCARE MAINTENANCE: ICD-10-CM

## 2024-04-26 DIAGNOSIS — E66.01 MORBID OBESITY (H): ICD-10-CM

## 2024-04-26 DIAGNOSIS — R93.1 AGATSTON CORONARY ARTERY CALCIUM SCORE BETWEEN 200 AND 399: ICD-10-CM

## 2024-04-26 DIAGNOSIS — E55.9 VITAMIN D DEFICIENCY: ICD-10-CM

## 2024-04-26 DIAGNOSIS — R73.03 PREDIABETES: ICD-10-CM

## 2024-04-26 DIAGNOSIS — Z29.11 NEED FOR VACCINATION AGAINST RESPIRATORY SYNCYTIAL VIRUS: ICD-10-CM

## 2024-04-26 LAB
ALBUMIN SERPL BCG-MCNC: 4.3 G/DL (ref 3.5–5.2)
ALP SERPL-CCNC: 84 U/L (ref 40–150)
ALT SERPL W P-5'-P-CCNC: 20 U/L (ref 0–50)
ANION GAP SERPL CALCULATED.3IONS-SCNC: 10 MMOL/L (ref 7–15)
AST SERPL W P-5'-P-CCNC: 23 U/L (ref 0–45)
BILIRUB SERPL-MCNC: 0.5 MG/DL
BUN SERPL-MCNC: 19.6 MG/DL (ref 8–23)
CALCIUM SERPL-MCNC: 9.6 MG/DL (ref 8.8–10.2)
CHLORIDE SERPL-SCNC: 104 MMOL/L (ref 98–107)
CHOLEST SERPL-MCNC: 143 MG/DL
CREAT SERPL-MCNC: 0.8 MG/DL (ref 0.51–0.95)
CREAT UR-MCNC: 24.6 MG/DL
DEPRECATED HCO3 PLAS-SCNC: 28 MMOL/L (ref 22–29)
EGFRCR SERPLBLD CKD-EPI 2021: 77 ML/MIN/1.73M2
FASTING STATUS PATIENT QL REPORTED: NORMAL
GLUCOSE SERPL-MCNC: 102 MG/DL (ref 70–99)
HBA1C MFR BLD: 5.6 % (ref 0–5.6)
HDLC SERPL-MCNC: 70 MG/DL
LDLC SERPL CALC-MCNC: 60 MG/DL
MICROALBUMIN UR-MCNC: <12 MG/L
MICROALBUMIN/CREAT UR: NORMAL MG/G{CREAT}
NONHDLC SERPL-MCNC: 73 MG/DL
POTASSIUM SERPL-SCNC: 4.2 MMOL/L (ref 3.4–5.3)
PROT SERPL-MCNC: 7.3 G/DL (ref 6.4–8.3)
SODIUM SERPL-SCNC: 142 MMOL/L (ref 135–145)
TRIGL SERPL-MCNC: 63 MG/DL
TSH SERPL DL<=0.005 MIU/L-ACNC: 2.74 UIU/ML (ref 0.3–4.2)
VIT D+METAB SERPL-MCNC: 64 NG/ML (ref 20–50)

## 2024-04-26 PROCEDURE — 80053 COMPREHEN METABOLIC PANEL: CPT | Performed by: INTERNAL MEDICINE

## 2024-04-26 PROCEDURE — 82306 VITAMIN D 25 HYDROXY: CPT | Performed by: INTERNAL MEDICINE

## 2024-04-26 PROCEDURE — 82570 ASSAY OF URINE CREATININE: CPT | Performed by: INTERNAL MEDICINE

## 2024-04-26 PROCEDURE — 91320 SARSCV2 VAC 30MCG TRS-SUC IM: CPT | Performed by: INTERNAL MEDICINE

## 2024-04-26 PROCEDURE — G0439 PPPS, SUBSEQ VISIT: HCPCS | Performed by: INTERNAL MEDICINE

## 2024-04-26 PROCEDURE — 99214 OFFICE O/P EST MOD 30 MIN: CPT | Mod: 25 | Performed by: INTERNAL MEDICINE

## 2024-04-26 PROCEDURE — 80061 LIPID PANEL: CPT | Performed by: INTERNAL MEDICINE

## 2024-04-26 PROCEDURE — 82043 UR ALBUMIN QUANTITATIVE: CPT | Performed by: INTERNAL MEDICINE

## 2024-04-26 PROCEDURE — 36415 COLL VENOUS BLD VENIPUNCTURE: CPT | Performed by: INTERNAL MEDICINE

## 2024-04-26 PROCEDURE — 84443 ASSAY THYROID STIM HORMONE: CPT | Performed by: INTERNAL MEDICINE

## 2024-04-26 PROCEDURE — 90480 ADMN SARSCOV2 VAC 1/ONLY CMP: CPT | Performed by: INTERNAL MEDICINE

## 2024-04-26 PROCEDURE — 83036 HEMOGLOBIN GLYCOSYLATED A1C: CPT | Performed by: INTERNAL MEDICINE

## 2024-04-26 RX ORDER — RESPIRATORY SYNCYTIAL VIRUS VACCINE 120MCG/0.5
0.5 KIT INTRAMUSCULAR ONCE
Qty: 1 EACH | Refills: 0 | Status: SHIPPED | OUTPATIENT
Start: 2024-04-26 | End: 2024-04-26

## 2024-04-26 ASSESSMENT — PAIN SCALES - GENERAL: PAINLEVEL: NO PAIN (0)

## 2024-04-26 NOTE — PROGRESS NOTES
Preventive Care Visit  [facility]  Marcela Amato MD, Internal Medicine  Apr 26, 2024        1. Need for vaccination against respiratory syncytial virus    - respiratory syncytial virus vaccine, bivalent (ABRYSVO) injection; Inject 0.5 mLs into the muscle once for 1 dose  Dispense: 1 each; Refill: 0    2. Chronic renal insufficiency, stage 1  Have been good   - Albumin Random Urine Quantitative with Creat Ratio; Future  - Albumin Random Urine Quantitative with Creat Ratio    3. Agatston coronary artery calcium score between 200 and 399  On statin   - Lipid panel reflex to direct LDL Non-fasting; Future  - Lipid panel reflex to direct LDL Non-fasting    4. Essential Hypertension  Suboptimal donated blood recently and that was normal then she is actually usually normal normal.  Will she will send me her home readings over 2 weeks.  - Vitamin D Deficiency; Future  - Comprehensive metabolic panel; Future  - TSH; Future  - Vitamin D Deficiency  - Comprehensive metabolic panel  - TSH    5. Healthcare maintenance  Mammogram is up-to-date colonoscopy is up-to-date due in 2027 bone density due 2026    6. Obesity (BMI 35.0-39.9) with comorbidity (H)      7. Prediabetes  Stable  - Hemoglobin A1c; Future  - Hemoglobin A1c    8. Vitamin D deficiency    - Vitamin D Deficiency; Future  - Vitamin D Deficiency    Overall doing very well     Subjective   Munira is a 73 year old, presenting for the following:  Physical and Recheck Medication   has a stroke and it has been stressful   Tries earnestine stay very busy , sings in a choir   Works in her garden [place]         4/26/2024    10:12 AM   Additional Questions   Roomed by tanner hsu         Health Care Directive  Patient does not have a Health Care Directive or Living Will:   Discussed advance care planning with patient; information given to patient to review.  HPI              4/24/2024   General Health   How would you rate your overall physical  health? Good   Feel stress (tense, anxious, or unable to sleep) To some extent   (!) STRESS CONCERN      4/24/2024   Nutrition   Diet: Regular (no restrictions)         4/24/2024   Exercise   Days per week of moderate/strenous exercise 2 days   Average minutes spent exercising at this level 40 min   (!) EXERCISE CONCERN      4/24/2024   Social Factors   Frequency of gathering with friends or relatives More than three times a week   Worry food won't last until get money to buy more No   Food not last or not have enough money for food? No   Do you have housing?  Yes   Are you worried about losing your housing? No   Lack of transportation? No   Unable to get utilities (heat,electricity)? No         4/26/2024   Fall Risk   Fallen 2 or more times in the past year? No   Trouble with walking or balance? Yes   Gait Speed Test (Document in seconds) 4.2   Gait Speed Test Interpretation Less than or equal to 5.00 seconds - PASS          4/24/2024   Activities of Daily Living- Home Safety   Needs help with the following daily activites None of the above   Safety concerns in the home None of the above         4/24/2024   Dental   Dentist two times every year? Yes         4/24/2024   Hearing Screening   Hearing concerns? None of the above         4/24/2024   Driving Risk Screening   Patient/family members have concerns about driving No         4/24/2024   General Alertness/Fatigue Screening   Have you been more tired than usual lately? No         4/24/2024   Urinary Incontinence Screening   Bothered by leaking urine in past 6 months Yes         4/24/2024   TB Screening   Were you born outside of the [place]? No         Today's PHQ-2 Score:       4/26/2024    10:05 AM   PHQ-2 ( 1999 Pf[employer])   Q1: Little interest or pleasure in doing things 0   Q2: Feeling down, depressed or hopeless 0   PHQ-2 Score 0   Q1: Little interest or pleasure in doing things Not at all   Q2: Feeling down, depressed or hopeless Not at all   PHQ-2 Score 0            4/24/2024   Substance Use   Alcohol more than 3/day or more than 7/wk No   Do you have a current opioid prescription? No   How severe/bad is pain from 1 to 10? 0/10 (No Pain)   Do you use any other substances recreationally? No     Social History     Tobacco Use    Smoking status: Never    Smokeless tobacco: Never   Vaping Use    Vaping status: Never Used   Substance Use Topics    Alcohol use: Yes     Comment: Alcoholic Drinks/day: Wine 1 glass daily     Drug use: Never           4/13/2023   LAST FHS-7 RESULTS   1st degree relative breast or ovarian cancer No   Any relative bilateral breast cancer No   Any male have breast cancer No   Any ONE woman have BOTH breast AND ovarian cancer No   Any woman with breast cancer before 50yrs No   2 or more relatives with breast AND/OR ovarian cancer No   2 or more relatives with breast AND/OR bowel cancer No            ASCVD Risk   The 10-year ASCVD risk score (Elisa MCKEON, et al., 2019) is: 28.4%    Values used to calculate the score:      Age: 73 years      Sex: Female      Is Non- : No      Diabetic: No      Tobacco smoker: No      Systolic Blood Pressure: 164 mmHg      Is BP treated: Yes      HDL Cholesterol: 84 mg/dL      Total Cholesterol: 159 mg/dL            Reviewed and updated as needed this visit by Provider                    Current Outpatient Medications   Medication Sig Dispense Refill    acetaminophen (TYLENOL) 500 MG tablet Take 500 mg by mouth every 6 hours as needed for mild pain      atorvastatin (LIPITOR) 20 MG tablet TAKE 1 TABLET (20 MG TOTAL) BY MOUTH DAILY. 90 tablet 1    calcium carbonate (TUMS) 500 MG chewable tablet Take 0.5 chew tab by mouth 2 times daily      Coenzyme Q-10 60 MG CAPS Take 1 capsule by mouth daily      furosemide (LASIX) 20 MG tablet TAKE 1 TABLET (20 MG TOTAL) BY MOUTH DAILY. 90 tablet 0    losartan (COZAAR) 100 MG tablet TAKE 1 TABLET (100 MG TOTAL) BY MOUTH DAILY. 90 tablet 1    melatonin  10 mg Tab [MELATONIN 10 MG TAB] Take 1 tablet by mouth at bedtime.      multivitamin therapeutic (THERAGRAN) tablet [MULTIVITAMIN THERAPEUTIC (THERAGRAN) TABLET] Take 1 tablet by mouth daily.      omeprazole (PRILOSEC) 20 MG DR capsule TAKE ONE CAPSULE ONCE DAILY 90 capsule 3    respiratory syncytial virus vaccine, bivalent (ABRYSVO) injection Inject 0.5 mLs into the muscle once for 1 dose 1 each 0    senna (SENOKOT) 8.6 mg tablet Take 1 tablet by mouth 2 times daily      sulindac (CLINORIL) 200 MG tablet TAKE 1 TABLET (200 MG) BY MOUTH 2 TIMES DAILY 60 tablet 10    vitamin D3 (CHOLECALCIFEROL) 50 mcg (2000 units) tablet Take 1 tablet by mouth 2 times daily       No current facility-administered medications for this visit.       Current providers sharing in care for this patient include:  Patient Care Team:  Marcela Amato MD as PCP - General (Internal Medicine)  Marcela Amato MD as Assigned PCP  Ginny Miller MD as MD (OB/Gyn)    The following health maintenance items are reviewed in Epic and correct as of today:  Health Maintenance   Topic Date Due    RSV VACCINE (Pregnancy & 60+) (1 - 1-dose 60+ series) Never done    COVID-19 Vaccine (7 - 2023-24 season) 03/07/2024    BMP  04/05/2024    LIPID  04/05/2024    MICROALBUMIN  04/05/2024    ANNUAL REVIEW OF HM ORDERS  04/05/2024    MEDICARE ANNUAL WELLNESS VISIT  04/05/2024    MAMMO SCREENING  04/13/2025    FALL RISK ASSESSMENT  04/26/2025    GLUCOSE  04/05/2026    ADVANCE CARE PLANNING  04/05/2028    COLORECTAL CANCER SCREENING  06/22/2032    DTAP/TDAP/TD IMMUNIZATION (2 - Td or Tdap) 05/08/2033    DEXA  01/13/2036    HEPATITIS C SCREENING  Completed    PHQ-2 (once per calendar year)  Completed    INFLUENZA VACCINE  Completed    Pneumococcal Vaccine: 65+ Years  Completed    URINALYSIS  Completed    ZOSTER IMMUNIZATION  Completed    IPV IMMUNIZATION  Aged Out    HPV IMMUNIZATION  Aged Out    MENINGITIS IMMUNIZATION  Aged Out    RSV MONOCLONAL ANTIBODY   "Aged Out            Objective    Exam  BP (!) 164/92 (BP Location: Left arm, Patient Position: Sitting, Cuff Size: Adult Large)   Pulse 67   Temp 98.1  F (36.7  C)   Resp 14   Ht 1.676 m (5' 6\")   Wt 100.6 kg (221 lb 12.8 oz)   LMP  (LMP Unknown)   SpO2 98%   BMI 35.80 kg/m     Estimated body mass index is 35.8 kg/m  as calculated from the following:    Height as of this encounter: 1.676 m (5' 6\").    Weight as of this encounter: 100.6 kg (221 lb 12.8 oz).    Physical Exam  GENERAL: alert and no distress  NECK: no adenopathy, no asymmetry, masses, or scars  RESP: lungs clear to auscultation - no rales, rhonchi or wheezes  CV: regular rate and rhythm, normal S1 S2, no S3 or S4, no murmur, click or rub, no peripheral edema  ABDOMEN: soft, nontender, no hepatosplenomegaly, no masses and bowel sounds normal  MS: no gross musculoskeletal defects noted, no edema        4/26/2024   Mini Cog   Clock Draw Score 2 Normal   3 Item Recall 3 objects recalled   Mini Cog Total Score 5              Signed Electronically by: Marcela Amato MD    "

## 2024-04-26 NOTE — PATIENT INSTRUCTIONS
Send me home BP readings via [employer] 2 weeks   Screening :  mammo annual  Colon 2017 did  2022 , next in 5 years   dexa 2021 normal next due 2026

## 2024-04-26 NOTE — NURSING NOTE
Patient tolerated injection without incident. Injection site free from redness, swelling, or pain. Patient discharged in the care of lab. Patient aware of next appointment.

## 2024-05-01 ENCOUNTER — HOSPITAL ENCOUNTER (OUTPATIENT)
Dept: MAMMOGRAPHY | Facility: CLINIC | Age: 74
Discharge: HOME | End: 2024-05-01
Attending: INTERNAL MEDICINE | Admitting: INTERNAL MEDICINE
Payer: MEDICARE

## 2024-05-01 DIAGNOSIS — Z12.31 VISIT FOR SCREENING MAMMOGRAM: ICD-10-CM

## 2024-05-01 PROCEDURE — 77067 SCR MAMMO BI INCL CAD: CPT

## 2024-05-09 DIAGNOSIS — R07.89 CHEST PRESSURE: ICD-10-CM

## 2024-05-09 RX ORDER — LOSARTAN POTASSIUM 100 MG/1
TABLET ORAL
Qty: 90 TABLET | Refills: 3 | Status: SHIPPED | OUTPATIENT
Start: 2024-05-09

## 2024-05-13 DIAGNOSIS — I10 HYPERTENSION, UNSPECIFIED TYPE: Primary | ICD-10-CM

## 2024-05-13 DIAGNOSIS — R05.8 DRY COUGH: ICD-10-CM

## 2024-05-13 RX ORDER — HYDROCHLOROTHIAZIDE 25 MG/1
25 TABLET ORAL DAILY
Qty: 90 TABLET | Refills: 3 | Status: SHIPPED | OUTPATIENT
Start: 2024-05-13

## 2024-05-13 RX ORDER — CODEINE PHOSPHATE/GUAIFENESIN 10-100MG/5
5 LIQUID (ML) ORAL EVERY 4 HOURS PRN
Qty: 237 ML | Refills: 0 | Status: SHIPPED | OUTPATIENT
Start: 2024-05-13 | End: 2024-06-12

## 2024-06-12 ENCOUNTER — OFFICE VISIT (OUTPATIENT)
Dept: INTERNAL MEDICINE | Facility: CLINIC | Age: 74
End: 2024-06-12
Payer: COMMERCIAL

## 2024-06-12 VITALS
DIASTOLIC BLOOD PRESSURE: 70 MMHG | TEMPERATURE: 97.8 F | WEIGHT: 219 LBS | OXYGEN SATURATION: 97 % | HEIGHT: 66 IN | SYSTOLIC BLOOD PRESSURE: 129 MMHG | BODY MASS INDEX: 35.2 KG/M2 | HEART RATE: 62 BPM | RESPIRATION RATE: 15 BRPM

## 2024-06-12 DIAGNOSIS — I10 ESSENTIAL HYPERTENSION: Primary | ICD-10-CM

## 2024-06-12 DIAGNOSIS — E66.01 MORBID OBESITY (H): ICD-10-CM

## 2024-06-12 PROCEDURE — 99213 OFFICE O/P EST LOW 20 MIN: CPT | Performed by: INTERNAL MEDICINE

## 2024-06-12 PROCEDURE — 80048 BASIC METABOLIC PNL TOTAL CA: CPT | Performed by: INTERNAL MEDICINE

## 2024-06-12 PROCEDURE — 36415 COLL VENOUS BLD VENIPUNCTURE: CPT | Performed by: INTERNAL MEDICINE

## 2024-06-12 NOTE — PROGRESS NOTES
"  Assessment & Plan     Essential Hypertension  Proved with the addition of hydrochlorothiazide.  However we will check labs for electrolyte or kidney problems given that she is taking 2 diuretics.  Pressures at home are better as well she gets lower readings in the evenings she could switch losartan to evening and Lasix and hydrochlorothiazide in the morning  - Basic metabolic panel  (Ca, Cl, CO2, Creat, Gluc, K, Na, BUN); Future  - Basic metabolic panel  (Ca, Cl, CO2, Creat, Gluc, K, Na, BUN)    Morbid obesity (H)            BMI  Estimated body mass index is 35.35 kg/m  as calculated from the following:    Height as of this encounter: 1.676 m (5' 6\").    Weight as of this encounter: 99.3 kg (219 lb).             Sera Lombardo is a 73 year old, presenting for the following health issues:  Hypertension (Medication follow up - pt reports some dizziness with HTCZ )      6/12/2024    12:52 PM   Additional Questions   Roomed by MARV Riggins   Accompanied by alone         6/12/2024    12:52 PM   Patient Reported Additional Medications   Patient reports taking the following new medications none     History of Present Illness       Hypertension: She presents for follow up of hypertension.  She does check blood pressure  regularly outside of the clinic. Outpatient blood pressures have not been over 140/90. She does not follow a low salt diet.     She eats 2-3 servings of fruits and vegetables daily.She consumes 0 sweetened beverage(s) daily.She exercises with enough effort to increase her heart rate 30 to 60 minutes per day.  She exercises with enough effort to increase her heart rate 5 days per week.   She is taking medications regularly.                     Objective    /78 (BP Location: Left arm, Patient Position: Sitting, Cuff Size: Adult Regular)   Pulse 62   Temp 97.8  F (36.6  C) (Tympanic)   Resp 15   Ht 1.676 m (5' 6\")   Wt 99.3 kg (219 lb)   LMP  (LMP Unknown)   SpO2 97%   BMI 35.35 kg/m    Body " mass index is 35.35 kg/m .  Physical Exam               Signed Electronically by: Marcela Amato MD

## 2024-06-13 LAB
ANION GAP SERPL CALCULATED.3IONS-SCNC: 10 MMOL/L (ref 7–15)
BUN SERPL-MCNC: 17.4 MG/DL (ref 8–23)
CALCIUM SERPL-MCNC: 9.6 MG/DL (ref 8.8–10.2)
CHLORIDE SERPL-SCNC: 103 MMOL/L (ref 98–107)
CREAT SERPL-MCNC: 0.83 MG/DL (ref 0.51–0.95)
DEPRECATED HCO3 PLAS-SCNC: 29 MMOL/L (ref 22–29)
EGFRCR SERPLBLD CKD-EPI 2021: 74 ML/MIN/1.73M2
GLUCOSE SERPL-MCNC: 101 MG/DL (ref 70–99)
POTASSIUM SERPL-SCNC: 3.6 MMOL/L (ref 3.4–5.3)
SODIUM SERPL-SCNC: 142 MMOL/L (ref 135–145)

## 2024-06-20 DIAGNOSIS — I25.10 ASCVD (ARTERIOSCLEROTIC CARDIOVASCULAR DISEASE): ICD-10-CM

## 2024-06-20 RX ORDER — ATORVASTATIN CALCIUM 20 MG/1
TABLET, FILM COATED ORAL
Qty: 90 TABLET | Refills: 2 | Status: SHIPPED | OUTPATIENT
Start: 2024-06-20

## 2024-07-11 DIAGNOSIS — R10.13 DYSPEPSIA: ICD-10-CM

## 2024-07-11 DIAGNOSIS — E78.2 MIXED HYPERLIPIDEMIA: ICD-10-CM

## 2024-07-11 RX ORDER — FUROSEMIDE 20 MG
TABLET ORAL
Qty: 90 TABLET | Refills: 3 | Status: SHIPPED | OUTPATIENT
Start: 2024-07-11

## 2024-10-04 ENCOUNTER — TRANSFERRED RECORDS (OUTPATIENT)
Dept: HEALTH INFORMATION MANAGEMENT | Facility: CLINIC | Age: 74
End: 2024-10-04
Payer: COMMERCIAL

## 2025-03-26 DIAGNOSIS — I25.10 ASCVD (ARTERIOSCLEROTIC CARDIOVASCULAR DISEASE): ICD-10-CM

## 2025-03-26 RX ORDER — ATORVASTATIN CALCIUM 20 MG/1
20 TABLET, FILM COATED ORAL
Qty: 90 TABLET | Refills: 0 | Status: SHIPPED | OUTPATIENT
Start: 2025-03-26

## 2025-03-27 DIAGNOSIS — R10.13 DYSPEPSIA: ICD-10-CM

## 2025-03-27 RX ORDER — OMEPRAZOLE 20 MG/1
20 CAPSULE, DELAYED RELEASE ORAL DAILY
Qty: 90 CAPSULE | Refills: 0 | Status: SHIPPED | OUTPATIENT
Start: 2025-03-27

## 2025-04-14 DIAGNOSIS — I10 HYPERTENSION, UNSPECIFIED TYPE: ICD-10-CM

## 2025-04-14 DIAGNOSIS — R10.13 DYSPEPSIA: ICD-10-CM

## 2025-04-14 DIAGNOSIS — E78.2 MIXED HYPERLIPIDEMIA: ICD-10-CM

## 2025-04-14 DIAGNOSIS — R07.89 CHEST PRESSURE: ICD-10-CM

## 2025-04-14 RX ORDER — OMEPRAZOLE 20 MG/1
20 CAPSULE, DELAYED RELEASE ORAL DAILY
Qty: 90 CAPSULE | Refills: 0 | Status: SHIPPED | OUTPATIENT
Start: 2025-04-14

## 2025-04-14 RX ORDER — HYDROCHLOROTHIAZIDE 25 MG/1
25 TABLET ORAL
Qty: 90 TABLET | Refills: 2 | Status: SHIPPED | OUTPATIENT
Start: 2025-04-14

## 2025-04-14 RX ORDER — LOSARTAN POTASSIUM 100 MG/1
100 TABLET ORAL
Qty: 90 TABLET | Refills: 2 | Status: SHIPPED | OUTPATIENT
Start: 2025-04-14

## 2025-04-14 RX ORDER — FUROSEMIDE 20 MG/1
20 TABLET ORAL
Qty: 90 TABLET | Refills: 2 | Status: SHIPPED | OUTPATIENT
Start: 2025-04-14

## 2025-05-23 ENCOUNTER — HOSPITAL ENCOUNTER (OUTPATIENT)
Dept: MAMMOGRAPHY | Facility: CLINIC | Age: 75
Discharge: HOME | End: 2025-05-23
Attending: INTERNAL MEDICINE | Admitting: INTERNAL MEDICINE
Payer: MEDICARE

## 2025-05-23 DIAGNOSIS — Z12.31 VISIT FOR SCREENING MAMMOGRAM: ICD-10-CM

## 2025-05-23 PROCEDURE — 77067 SCR MAMMO BI INCL CAD: CPT

## 2025-06-06 PROBLEM — M25.512 BILATERAL SHOULDER PAIN: Status: RESOLVED | Noted: 2020-11-16 | Resolved: 2025-06-06

## 2025-06-06 PROBLEM — M25.562 LEFT KNEE PAIN: Status: RESOLVED | Noted: 2020-01-02 | Resolved: 2025-06-06

## 2025-06-06 PROBLEM — R91.1 INCIDENTAL LUNG NODULE, LESS THAN OR EQUAL TO 3MM: Status: RESOLVED | Noted: 2021-01-14 | Resolved: 2025-06-06

## 2025-06-06 PROBLEM — M25.511 BILATERAL SHOULDER PAIN: Status: RESOLVED | Noted: 2020-11-16 | Resolved: 2025-06-06

## 2025-06-10 ENCOUNTER — ORDERS ONLY (AUTO-RELEASED) (OUTPATIENT)
Dept: INTERNAL MEDICINE | Facility: CLINIC | Age: 75
End: 2025-06-10
Payer: COMMERCIAL

## 2025-06-10 DIAGNOSIS — D50.0 IRON DEFICIENCY ANEMIA DUE TO CHRONIC BLOOD LOSS: Primary | ICD-10-CM

## 2025-06-10 DIAGNOSIS — D50.0 IRON DEFICIENCY ANEMIA DUE TO CHRONIC BLOOD LOSS: ICD-10-CM

## 2025-06-20 ENCOUNTER — TELEPHONE (OUTPATIENT)
Dept: INTERNAL MEDICINE | Facility: CLINIC | Age: 75
End: 2025-06-20
Payer: COMMERCIAL

## 2025-06-20 DIAGNOSIS — Z12.11 COLON CANCER SCREENING: Primary | ICD-10-CM

## 2025-06-20 NOTE — TELEPHONE ENCOUNTER
General Call    Contacts       Contact Date/Time Type Contact Phone/Fax    06/20/2025 04:04 PM CDT Phone (Incoming) [facility] [phone]          Reason for Call:  Cologuard [facility]     What are your questions or concerns:  [facility] states that the ICD codes in the order will need to be changed to Z12.11 - Screening of colon and Z12.12 screening of rectal - please advise and send new order     Date of last appointment with provider: 06/06

## 2025-06-23 NOTE — TELEPHONE ENCOUNTER
Provider please review message below and advise on order request and change Dx code.    Order placed on 6/10/25 was linked with Dx Iron deficiency anemia due to chronic blood loss. New Cologuard order pended with requested Dx code below. Please sign if appropriate for change, thank you.

## 2025-06-25 ENCOUNTER — NURSE TRIAGE (OUTPATIENT)
Dept: INTERNAL MEDICINE | Facility: CLINIC | Age: 75
End: 2025-06-25
Payer: COMMERCIAL

## 2025-06-25 DIAGNOSIS — D50.0 IRON DEFICIENCY ANEMIA DUE TO CHRONIC BLOOD LOSS: Primary | ICD-10-CM

## 2025-06-25 NOTE — TELEPHONE ENCOUNTER
"Nurse Triage SBAR    Is this a 2nd Level Triage? YES, LICENSED PRACTITIONER REVIEW IS REQUIRED    Situation: Pt sent Shilpi reporting new onset of lightheadedness.    Background: Started Celebrex 6/6/25.  Last HGB 6/6/25 10.7.  Taking OTC Iron tablet.     Assessment: Pt reports she began feeling lightheaded last weekend.  Reports symptoms are worse with activity.  Symptoms do not worsen right away upon standing. Reports feeling unsteady on her feet and she is reluctant to leave the house to do errands. Lightheadedness while sitting is present, but mild.  Denies any spinning or tilting of the room. VSS at baseline /70 P 70s-80s. Reports very slight ringing in her ears.  Denies any discharge or ear pain.   Reports last HGB was \"low for me\", 10.7.  Denies any black stools, states, \"they may be a bit darker, but not black\".  Reports she is drinking adequate amount of fluids.  Denies any other symptoms.    Pt also reports a week ago she \"had a tumble\" off a stool while gardening.  Reports she hurt her shoulder a bit, but this has resolved.  Denies hitting her head.  States she did not fall a great distance.     Protocol Recommended Disposition:   Go To Office Now    Recommendation: No appts in clinic today.  Pt would like PCP guidance on any care advice and how quickly it is necessary for her to be seen. Prefers not to go to .        Does the patient meet one of the following criteria for ADS visit consideration? 16+ years old, with an [facility] PCP     TIP  Providers, please consider if this condition is appropriate for management at one of our Acute and Diagnostic Services sites.     If patient is a good candidate, please use dotphrase <dot>triageresponse and select Refer to ADS to document.    Reason for Disposition   Lightheadedness (dizziness) present now, after 2 hours of rest and fluids    Additional Information   Negative: SEVERE difficulty breathing (e.g., struggling for each breath, speaks in single " words)   Negative: Shock suspected (e.g., cold/pale/clammy skin, too weak to stand, low BP, rapid pulse)   Negative: Difficult to awaken or acting confused (e.g., disoriented, slurred speech)   Negative: Fainted, and still feels dizzy afterwards   Negative: Overdose (accidental or intentional) of medications   Negative: New neurologic deficit that is present now: * Weakness of the face, arm, or leg on one side of the body * Numbness of the face, arm, or leg on one side of the body * Loss of speech or garbled speech   Negative: Heart beating < 50 beats per minute OR > 140 beats per minute   Negative: Sounds like a life-threatening emergency to the triager   Negative: Chest pain   Negative: Rectal bleeding, bloody stool, or tarry-black stool   Negative: Diarrhea is main symptom   Negative: Headache is main symptom   Negative: Patient states that they are having an anxiety or panic attack   Negative: Heat exhaustion suspected (i.e., dehydration from heat exposure)   Negative: Dizziness from low blood sugar (i.e., < 60 mg/dl or 3.5 mmol/l)   Negative: Vomiting is main symptom   Negative: SEVERE dizziness (e.g., unable to stand, requires support to walk, feels like passing out now)   Negative: SEVERE headache or neck pain   Negative: Spinning or tilting sensation (vertigo) present now and one or more stroke risk factors (i.e., hypertension, diabetes mellitus, prior stroke/TIA, heart attack, age over 60) (Exception: Prior physician evaluation for this AND no different/worse than usual.)   Negative: Neurologic deficit that was brief (now gone), ANY of the following:* Weakness of the face, arm, or leg on one side of the body* Numbness of the face, arm, or leg on one side of the body* Loss of speech or garbled speech   Negative: Loss of vision or double vision  (Exception: Similar to previous migraines.)   Negative: Extra heartbeats, irregular heart beating, or heart is beating very fast (i.e., 'palpitations')   Negative:  Difficulty breathing   Negative: Drinking very little and dehydration suspected (e.g., no urine > 12 hours, very dry mouth, very lightheaded)   Negative: Follows bleeding (e.g., stomach, rectum, vagina)  (Exception: Became dizzy from sight of small amount blood.)   Negative: Patient sounds very sick or weak to the triager    Protocols used: Dizziness-A-OH

## 2025-06-25 NOTE — TELEPHONE ENCOUNTER
I had ordered an echo for her at her last visit  . Please assist her in scheudling it or give her the number to call   She can come in for a lab only tomorrow to check the follow up labs and also they should have sent her the cologard test . If they have not then she needs to do a FIT test from the lab /  she can be seen as an aproval only /same day slot next week   If she is very dizzy then got to ER

## 2025-06-30 ENCOUNTER — MYC MEDICAL ADVICE (OUTPATIENT)
Dept: INTERNAL MEDICINE | Facility: CLINIC | Age: 75
End: 2025-06-30

## 2025-06-30 ENCOUNTER — OFFICE VISIT (OUTPATIENT)
Dept: INTERNAL MEDICINE | Facility: CLINIC | Age: 75
End: 2025-06-30
Payer: COMMERCIAL

## 2025-06-30 VITALS
SYSTOLIC BLOOD PRESSURE: 122 MMHG | HEIGHT: 65 IN | RESPIRATION RATE: 17 BRPM | DIASTOLIC BLOOD PRESSURE: 72 MMHG | TEMPERATURE: 97.5 F | BODY MASS INDEX: 35.92 KG/M2 | HEART RATE: 59 BPM | WEIGHT: 215.6 LBS | OXYGEN SATURATION: 97 %

## 2025-06-30 DIAGNOSIS — D50.0 IRON DEFICIENCY ANEMIA DUE TO CHRONIC BLOOD LOSS: ICD-10-CM

## 2025-06-30 DIAGNOSIS — F41.9 ANXIETY DUE TO INVASIVE PROCEDURE: Primary | ICD-10-CM

## 2025-06-30 DIAGNOSIS — R27.0 ATAXIA: Primary | ICD-10-CM

## 2025-06-30 DIAGNOSIS — R42 DIZZINESS: ICD-10-CM

## 2025-06-30 PROCEDURE — 1126F AMNT PAIN NOTED NONE PRSNT: CPT | Performed by: INTERNAL MEDICINE

## 2025-06-30 PROCEDURE — 3078F DIAST BP <80 MM HG: CPT | Performed by: INTERNAL MEDICINE

## 2025-06-30 PROCEDURE — 3074F SYST BP LT 130 MM HG: CPT | Performed by: INTERNAL MEDICINE

## 2025-06-30 PROCEDURE — 99213 OFFICE O/P EST LOW 20 MIN: CPT | Performed by: INTERNAL MEDICINE

## 2025-06-30 ASSESSMENT — PAIN SCALES - GENERAL: PAINLEVEL_OUTOF10: NO PAIN (0)

## 2025-06-30 NOTE — TELEPHONE ENCOUNTER
Medication Question or Refill        What medication are you calling about (include dose and sig)?: sedative for MRI    Do you have any questions or concerns?  Request came in via Tranz    Could we send this information to you in Tranz or would you prefer to receive a phone call?:   Patient would like to be contacted via Tranz    Preferred Pharmacy:  [facility] - [place], [place] - [address]  [address] [place] [address]  Phone: [phone] Fax: [phone]      Controlled Substance Agreement on file:   CSA -- Patient Level:    CSA: None found at the patient level.

## 2025-06-30 NOTE — PROGRESS NOTES
"  Assessment & Plan     Ataxia  Mild . Rest of exam is normal and neruo is non focal unclear if she had trouble with heel to toe in the past   History doesn't fit into a diagnosis of BPPV as it is not positional and all the time   No prior history   Recommend evaluation for central causes of dizziness and vestibular testing and rehab   Anemia should not cause this   Most likely her anemia is due to blood donations   She will do FIT test    She is on oral iron and will recheck in 4 weeks her CBC   Stress could be playing a role in her symptoms   - MR Brain w/o Contrast; Future  - Physical Therapy  Referral; Future  - Adult Audiology  Referral; Future    Dizziness    - MR Brain w/o Contrast; Future  - Physical Therapy  Referral; Future  - Adult Audiology  Referral; Future    Iron deficiency anemia due to chronic blood loss    - Fecal colorectal cancer screen (FIT)          BMI  Estimated body mass index is 35.88 kg/m  as calculated from the following:    Height as of this encounter: 1.651 m (5' 5\").    Weight as of this encounter: 97.8 kg (215 lb 9.6 oz).             Subjective   Munira is a 75 year old, presenting for the following health issues:    Pt reports she began feeling lightheaded last weekend.  Reports symptoms are worse with activity.  Symptoms do not worsen right away upon standing. Reports feeling unsteady on her feet and she is reluctant to leave the house to do errands. Lightheadedness while sitting is present, but mild.  Denies any spinning or tilting of the room. VSS at baseline /70 P 70s-80s. Reports very slight ringing in her ears.  Denies any discharge or ear pain.   Reports last HGB was \"low for me\", 10.7.  Denies any black stools, states, \"they may be a bit darker, but not black\".  Reports she is drinking adequate amount of fluids.  Denies any other symptoms.     Pt also reports a week ago she \"had a tumble\" off a stool while gardening.  Reports she hurt " "her shoulder a bit, but this has resolved.  Denies hitting her head.  States she did not fall a great distance.    was in [place] and felt she had ears blocked   Dizziness (Pt reports feeling \"lightheaded and if I move too quickly I fee like I might fall, and ringing in my ears\")      6/30/2025     1:16 PM   Additional Questions   Roomed by Audra GENTILE RN   Accompanied by self         6/30/2025     1:16 PM   Patient Reported Additional Medications   Patient reports taking the following new medications none     History of Present Illness       Reason for visit:  Follow up   She is taking medications regularly.                      Objective    /72 (BP Location: Left arm, Patient Position: Sitting, Cuff Size: Adult Regular)   Pulse 59   Temp 97.5  F (36.4  C) (Temporal)   Resp 17   Ht 1.651 m (5' 5\")   Wt 97.8 kg (215 lb 9.6 oz)   LMP  (LMP Unknown)   SpO2 97%   BMI 35.88 kg/m    Body mass index is 35.88 kg/m .  Physical Exam   GENERAL: alert and no distress  NECK: no adenopathy, no asymmetry, masses, or scars  RESP: lungs clear to auscultation - no rales, rhonchi or wheezes  CV: regular rate and rhythm, normal S1 S2, no S3 or S4, no murmur, click or rub, no peripheral edema  ABDOMEN: soft, nontender, no hepatosplenomegaly, no masses and bowel sounds normal  MS: no gross musculoskeletal defects noted, no edema  NEURO: Normal strength and tone, fasciculations none , tremor none , sensory exam grossly normal, mentation intact, speech normal, cranial nerves 2-12 intact, DTR's normal and symmetric , gait abnormal: heel to toes, Romberg abnormal, and rapid alternating movements normal            Signed Electronically by: Marcela Amato MD    "

## 2025-06-30 NOTE — PATIENT INSTRUCTIONS
MRI brain to look for central causes   Inner ear testing and then rehabilitation   FIT test now , labs in one month

## 2025-07-01 ENCOUNTER — ANCILLARY PROCEDURE (OUTPATIENT)
Dept: CARDIOLOGY | Facility: CLINIC | Age: 75
End: 2025-07-01
Attending: INTERNAL MEDICINE
Payer: COMMERCIAL

## 2025-07-01 ENCOUNTER — PATIENT OUTREACH (OUTPATIENT)
Dept: CARE COORDINATION | Facility: CLINIC | Age: 75
End: 2025-07-01

## 2025-07-01 DIAGNOSIS — R06.02 SOB (SHORTNESS OF BREATH) ON EXERTION: ICD-10-CM

## 2025-07-01 DIAGNOSIS — R53.83 DECREASED STAMINA: ICD-10-CM

## 2025-07-01 DIAGNOSIS — I25.10 ASCVD (ARTERIOSCLEROTIC CARDIOVASCULAR DISEASE): ICD-10-CM

## 2025-07-01 LAB — LVEF ECHO: NORMAL

## 2025-07-01 PROCEDURE — 93306 TTE W/DOPPLER COMPLETE: CPT | Performed by: INTERNAL MEDICINE

## 2025-07-01 RX ORDER — ATORVASTATIN CALCIUM 20 MG/1
20 TABLET, FILM COATED ORAL
Qty: 90 TABLET | Refills: 0 | OUTPATIENT
Start: 2025-07-01

## 2025-07-01 RX ORDER — LORAZEPAM 0.5 MG/1
0.5 TABLET ORAL EVERY 6 HOURS PRN
Qty: 5 TABLET | Refills: 0 | Status: SHIPPED | OUTPATIENT
Start: 2025-07-01

## 2025-07-07 ENCOUNTER — THERAPY VISIT (OUTPATIENT)
Dept: PHYSICAL THERAPY | Facility: REHABILITATION | Age: 75
End: 2025-07-07
Payer: COMMERCIAL

## 2025-07-07 DIAGNOSIS — R27.0 ATAXIA: ICD-10-CM

## 2025-07-07 DIAGNOSIS — R42 DIZZINESS: Primary | ICD-10-CM

## 2025-07-07 PROCEDURE — 97161 PT EVAL LOW COMPLEX 20 MIN: CPT | Mod: GP

## 2025-07-07 NOTE — PROGRESS NOTES
"PHYSICAL THERAPY EVALUATION  Type of Visit: Evaluation       Fall Risk Screen:  Have you fallen 2 or more times in the past year?: No  Have you fallen and had an injury in the past year?: No    Subjective     Patient is a 75 year old female who presents with referral diagnoses of dizziness and ataxia. On evaluation, patient notes that she had a low hgb and felt this was the initial cause. Not a spinning sensation but a lightheadedness. Has VNG on 7/24 and brain MRI to be scheduled. Per chart review: \"Recommend evaluation for central causes of dizziness and vestibular testing and rehab \"          Presenting condition or subjective complaint: vertigo and ataxia  Date of onset: 06/30/25 (referral date)    Relevant medical history: Anemia; Arthritis; Dizziness; DVT (blood clot); High blood pressure; Implanted device; Menopause   Dates & types of surgery: left shoulder replacement 2023, left knee replacent 2020, right hipreplacement 2020, tubal ligation 1985, laparoscopy for ectopic pregnancy, appendectomy    Prior diagnostic imaging/testing results:       Prior therapy history for the same diagnosis, illness or injury: No      Prior Level of Function  Transfers: Independent  Ambulation: Independent  ADL: Independent  IADL: Driving, Finances, Housekeeping, Laundry, Meal preparation, Medication management    Living Environment  Social support: With a significant other or spouse   Type of home: [facility]; 2-story   Stairs to enter the home: No       Ramp: No   Stairs inside the home: Yes 1 Is there a railing: Yes     Help at home:    Equipment owned: Straight Cane; Walker with wheels; Grab bars     Employment: No    Hobbies/Interests: gardening, needlework    Patient goals for therapy: move quickly and confidently    Pain assessment: Pain denied     Objective      Cognitive Status Examination  Orientation: Oriented to person, place and time   Level of Consciousness: Alert  Follows Commands and Answers Questions: 100% of the " "time  Personal Safety and Judgement: Intact  Memory: Intact    OBSERVATION: Patient ambulating IND without device  INTEGUMENTARY: Intact  POSTURE: WFL  PALPATION: NT  RANGE OF MOTION: LE ROM WFL  STRENGTH: LE Strength WFL    BED MOBILITY: Independent    TRANSFERS: Independent    WHEELCHAIR MOBILITY: NA    GAIT:   Level of [place]: Independent  Assistive Device(s): None  Gait Deviations: WFL    BALANCE: Not formally assessed, will assess at future visits as indicated       VESTIBULAR EVALUATION  ADDITIONAL HISTORY:  Description of symptoms: Off balance; Light-headedness  Dizzy attacks:   Start: mid june   Last attack: continuous   Frequency of occurrences: continuous   Length of attack: continuous  Difficulty hearing:    Noise in ears? Yes faint ringing  Alleviates symptoms: sitting  Worsens symptoms: quick movement, leaning and then standing  Activities that bring on symptoms: Bending over; Turning while walking       Pertinent visual history: Corrective lenses  Pertinent history of current vestibular problem: NA  DHI: Total Score: (Patient-Rptd) 42      Oculomotor Screen    Ocular ROM Normal   Smooth Pursuit Slight overshoot from L > R, and up > down on L side    Saccades Occasional overshoot to R, lots of blinking and eye strain    VOR Normal   VOR Cancellation Patient notes feeling \"light headed\"   Head Impulse Test Normal and patient very guarded, difficulty to assess   Convergence Testing Normal        Infrared Goggle Exam Vestibular Suppressant in Last 24 Hours? No  Exam Completed With: Infrared goggles   Spontaneous Nystagmus Negative   Gaze Evoked Nystagmus Negative   Head Shake Horizontal Nystagmus Upbeating    Left Right   Stacie-Hallpike Negative Horizontal L, upbeating on return to sit    [facility] Supine Roll Test Horizontal R, no symptoms Horizontal L, no symptoms    BPPV Canal(s): possible horizontal canal, however patient without symptoms on positional testing      Assessment & Plan   CLINICAL " IMPRESSIONS  Medical Diagnosis: Dizziness    Treatment Diagnosis: Dizziness   Impression/Assessment: Patient is a 75 year old female who presents with a referral diagnosis of ataxia and dizziness. On evaluation, patient does demonstrate some signs of central causes for dizziness including difficulty with smooth pursuits and saccades. Nystagmus present in positional testing, however no increase in dizziness and non fatiguing, which decreases suspicion of peripheral vestibular involvement. Upbeating nystagmus present. Discussed option to try BPPV treatment, however patient deciding to wait until after brain MRI and VNG. Will assess at next follow up after additional testing to determine full PT POC. She will benefit from continued skilled PT intervention to address deficits and progress goals.      Clinical Decision Making (Complexity):  Clinical Presentation: Stable/Uncomplicated  Clinical Presentation Rationale: based on medical and personal factors listed in PT evaluation  Clinical Decision Making (Complexity): Low complexity    PLAN OF CARE  Treatment Interventions:  Interventions: Gait Training, Neuromuscular Re-education, Therapeutic Activity, Therapeutic Exercise, Self-Care/Home Management, Canalith Repositioning    Long Term Goals     PT Goal 1  Goal Identifier: HEP/education  Goal Description: Pt will be able to demonstrate HEP activities and/or verbalize understanding of vestibular specific education without need for cues from provider to demonstrate understanding and independence.  Rationale: to maximize safety and independence with performance of ADLs and functional tasks;to maximize safety and independence within the home;to maximize safety and independence within the community  Target Date: 10/04/25  PT Goal 2  Goal Identifier: DHI  Goal Description: Pt will demonstrate an 18 point improvement on the DHI to demonstrate decreased dizziness resulting in ability to perform ADLs independently.  Target Date:  10/04/25      Frequency of Treatment: 1x/week adjusting as clinically appropriate  Duration of Treatment: 90 days    Recommended Referrals to Other Professionals: Patient to meet with ENT and will have a brain MRI  Education Assessment:   Learner/Method: Patient;Demonstration;Listening  Education Comments: Pt verbalizes/demos understanding of education.    Risks and benefits of evaluation/treatment have been explained.   Patient/Family/caregiver agrees with Plan of Care.     Evaluation Time:        Signing Clinician: Wen Pearson PT, DPT        [facility]                                                                                   OUTPATIENT PHYSICAL THERAPY      PLAN OF TREATMENT FOR OUTPATIENT REHABILITATION   Patient's Last Name, First Name, STEVENSON JaneMichael,Karen  JAGRUTI YOB: 1950   Provider's Name   [facility]   Medical Record No.  [id number]     Onset Date: 06/30/25 (referral date)  Start of Care Date: 07/07/25     Medical Diagnosis:  Dizziness      PT Treatment Diagnosis:  Dizziness Plan of Treatment  Frequency/Duration: 1x/week adjusting as clinically appropriate/ 90 days    Certification date from 07/07/25 to 10/04/25         See note for plan of treatment details and functional goals     Wen Pearson PT, DPT                         I CERTIFY THE NEED FOR THESE SERVICES FURNISHED UNDER        THIS PLAN OF TREATMENT AND WHILE UNDER MY CARE     (Physician attestation of this document indicates review and certification of the therapy plan).              Referring Provider:  Marcela Amato    Initial Assessment  See Epic Evaluation- Start of Care Date: 07/07/25

## 2025-07-15 ENCOUNTER — HOSPITAL ENCOUNTER (OUTPATIENT)
Dept: MRI IMAGING | Facility: CLINIC | Age: 75
Discharge: HOME | End: 2025-07-15
Attending: INTERNAL MEDICINE
Payer: MEDICARE

## 2025-07-15 DIAGNOSIS — R27.0 ATAXIA: ICD-10-CM

## 2025-07-15 DIAGNOSIS — R42 DIZZINESS: ICD-10-CM

## 2025-07-15 PROCEDURE — 70551 MRI BRAIN STEM W/O DYE: CPT

## 2025-07-17 NOTE — PROGRESS NOTES
"AUDIOLOGY REPORT-BALANCE ASSESSMENT    SUBJECTIVE: Munira Rodriguez, 75 year old, was seen in Audiology at the [facility] on 7/24/2025, for videonystagmography (VNG), referred by Marcela Amato M.D. Patient is accompanied to today's appointment by her son.    Patient presents with chief complaints of dizziness described as a sense of feeling \"spacey\", lightheaded and off-balance/imbalance. Patient reports symptoms onset upon waking approximately 1 month ago and have persisted since. Patient reports being in [place] at the end of May and having a hard time equalizing her ears but notes that this was 2-3 weeks prior to onset of dizziness. Patient denies spinning sensation or nausea. Patient reports symptoms have overall remained the same but she can have some slightly better days. Patient reports symptoms are present when sitting still and can slightly worsen when she is up moving around. She reports quick body pivots and bending over may increase symptoms or cause her to need to catch herself. Patient reports sometimes listing or veering to either side while walking, possibly favoring her right more. She notes often holding onto doorways to help her feel more stable. Patient reports one fall where she fell off a gardening stool that was imbalanced due to uneven grass. She reports falling onto the grass and did not hit her head or experience dizziness prior to her fall.    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 An asymmetry of 10-20 dB is noted from 0870-5905 Hz with the left ear poorer. Patient denies fluctuations or changes in her hearing. She reports bilateral mild tinnitus; no changes to tinnitus since onset of dizziness. Patient denies ear pain, drainage, aural " fullness\pressure, and history of previous ear surgeries.    Patient denies history of headaches or migraines. She denies head pain or pressure with her dizziness. She denies sensitivity to lights and loud sounds. She denies dizziness provoked by loud sounds. Patient denies motion intolerance. Patient reports history of having some skin cancer removed but denies other history of cancer. Patient denies history of head injuries or concussions. She denies dizziness provoked by coughing, sneezing, blowing her nose, lifting heavy items or bearing down. She denies autophony (eye blinks). Patient denies persistent motion post cessation of motion or motion while still (rocking or swaying). Patient denies double vision, blurred vision, and history of previous eye surgeries. Family history is positive for age-related hearing loss. Family history is negative for migraines and dizziness\vertigo. Patient denies consumption of caffeinated beverages, nicotine, alcoholic substances, or use of medications with known vestibular interactions within the past 48 hours.    OBJECTIVE:  Videonystagmography (VNG) testing:  Prescreening:  Tympanograms: Normal eardrum mobility bilaterally. Note: this test is completed to determine the status of the middle ear before irrigations are completed. *Please see audiogram for tympanograms.  Ocular range of motion and ocular counter roll: Normal   Cross/cover: Normal  Head Thrust: Negative    Nystagmus Tests:  Gaze-Horizontal with fixation:   Center: Normal   Right: Normal   Left: Normal  Gaze-Vertical with fixation:   Up: Normal   Down: Normal  Gaze with fixation denied   Center: Spontaneous 1 degree/s left beating nystagmus.   Right: Normal   Left: 1-2 degrees/s left beating nystagmus (spontaneous).   Up: Intermittent 1 degree/s left beating nystagmus (spontaneous).  High Frequency Headshake:   Horizontal: Negative. 1 degree/s left beating nystagmus (spontaneous), patient reports mild increased  dizziness post head shake.   Vertical: Negative. No consistent nystagmus, patient reports moderate increased dizziness post head shake.    [place]-Hallpike Head Right: Negative for PC BPPV. 2 degree/s left beating nystagmus (spontaneous), reports mild increased dizziness\lightheadedness with all positional changes, greater right side.  [place] Head Left: Negative for PC BPPV. No consistent nystagmus, patient reports mild increased dizziness\lightheadedness with all positional changes.  Roll Test Head Right: Negative for HC BPPV. 2 degrees/s left beating nystagmus (spontaneous), no change in symptoms.  Roll Test Head Left: Negative for HC BPPV. Non-fatiguing 1 degree/s right beating nystagmus, no change in symptoms.    Positional Testing:  Positionals: Supine: 1 degree/s left beating nystagmus (spontaneous), suppresses with fixation, no change in symptoms.  Positionals: Body Right: 5 degrees/s left beating nystagmus (increased from spontaneous), decreases but unable to fully suppress with fixation, no change in symptoms.  Positionals: Body Left: 6 degrees/s right beating nystagmus, decreases but unable to fully suppress with fixation, no change in symptoms.  Positionals: Pre-Caloric: No consistent nystagmus, no change in symptoms.    Oculomotor Tests:  Saccades: Borderline abnormal (repeatable).  Pursuit: Abnormal (repeatable).    Calorics:  (Tested at 44 degrees and 30 degrees Celsius for 30 seconds for warm and cool water, respectively):  Right Warm Eye Speed: 25 degrees per second right beating  Left Warm Eye Speed: 32 degrees per second left beating  Right Cool Eye Speed: 5 degrees per second left beating  Left Cool Eye Speed: 15 degrees per second right beating  Difference between ear: 22% right hypofunction. (Greater than 25% considered clinically significant.)  Fixation Index: Normal  Overall caloric test: Normal    Post Irrigations Otoscopy: Normal     ASSESSMENT:  1. Indications of central vestibular system  involvement noted on today's exam were as follows:   -Abnormal Pursuit and Borderline abnormal Saccade testing (repeatable).  - Right beating nystagmus, decreases but unable to fully suppress with fixation, evident in Body Left Positional.  - Increased left beating nystagmus, decreases but unable to fully suppress with fixation, evident in Body Right Positional.    2. Indications of possible peripheral vestibular system involvement noted on today's exam were as follows:   -Mild spontaneous left beating nystagmus evident throughout.      PLAN:  Consider trial of vestibular physical therapy. Follow-up with Dr. Marcela Amato regarding today's results and for medical management. Please call this clinic at [phone] with questions regarding these results or recommendations.       Neeta Cai. CCC-A  Vestibular Audiologist   [place] #[id number]

## 2025-07-24 ENCOUNTER — OFFICE VISIT (OUTPATIENT)
Dept: AUDIOLOGY | Facility: CLINIC | Age: 75
End: 2025-07-24
Attending: INTERNAL MEDICINE
Payer: COMMERCIAL

## 2025-07-24 DIAGNOSIS — R42 DIZZINESS: Primary | ICD-10-CM

## 2025-07-24 DIAGNOSIS — H90.3 SENSORINEURAL HEARING LOSS (SNHL) OF BOTH EARS: Primary | ICD-10-CM

## 2025-07-24 DIAGNOSIS — R42 DIZZINESS: ICD-10-CM

## 2025-07-24 DIAGNOSIS — R27.0 ATAXIA: ICD-10-CM

## 2025-07-24 NOTE — PROGRESS NOTES
AUDIOLOGY REPORT     SUMMARY: Audiology visit completed. See audiogram for results.       RECOMMENDATIONS: Continue with balance testing as scheduled.      Neeta Bernstein. CCC-A  Licensed Audiologist   [place] #[id number]

## 2025-07-24 NOTE — Clinical Note
Kind of seems like the patient may have had something occur with the ear that just didn't leave a significant difference between the ears (hypofunction) but there is more likely something central going on contributing to the dizziness. You could consider having the patient trial vestibular physical therapy and if they don't improve then consider referral to neurology but up to you what you want to do.  Thanks, Carlyn

## 2025-07-29 ENCOUNTER — LAB (OUTPATIENT)
Dept: LAB | Facility: CLINIC | Age: 75
End: 2025-07-29
Payer: MEDICARE

## 2025-07-29 DIAGNOSIS — D50.0 IRON DEFICIENCY ANEMIA DUE TO CHRONIC BLOOD LOSS: ICD-10-CM

## 2025-07-29 LAB
BASOPHILS # BLD AUTO: 0.1 10E3/UL (ref 0–0.2)
BASOPHILS NFR BLD AUTO: 1 %
EOSINOPHIL # BLD AUTO: 0.2 10E3/UL (ref 0–0.7)
EOSINOPHIL NFR BLD AUTO: 3 %
ERYTHROCYTE [DISTWIDTH] IN BLOOD BY AUTOMATED COUNT: 23 % (ref 10–15)
FERRITIN SERPL-MCNC: 59 NG/ML (ref 11–328)
HCT VFR BLD AUTO: 37.8 % (ref 35–47)
HGB BLD-MCNC: 11.8 G/DL (ref 11.7–15.7)
IMM GRANULOCYTES # BLD: 0 10E3/UL
IMM GRANULOCYTES NFR BLD: 0 %
LYMPHOCYTES # BLD AUTO: 1.6 10E3/UL (ref 0.8–5.3)
LYMPHOCYTES NFR BLD AUTO: 32 %
MCH RBC QN AUTO: 26.9 PG (ref 26.5–33)
MCHC RBC AUTO-ENTMCNC: 31.2 G/DL (ref 31.5–36.5)
MCV RBC AUTO: 86 FL (ref 78–100)
MONOCYTES # BLD AUTO: 0.5 10E3/UL (ref 0–1.3)
MONOCYTES NFR BLD AUTO: 10 %
NEUTROPHILS # BLD AUTO: 2.8 10E3/UL (ref 1.6–8.3)
NEUTROPHILS NFR BLD AUTO: 55 %
NRBC # BLD AUTO: 0 10E3/UL
NRBC BLD AUTO-RTO: 0 /100
PLATELET # BLD AUTO: 255 10E3/UL (ref 150–450)
RBC # BLD AUTO: 4.39 10E6/UL (ref 3.8–5.2)
WBC # BLD AUTO: 5.1 10E3/UL (ref 4–11)

## 2025-07-29 PROCEDURE — 82728 ASSAY OF FERRITIN: CPT

## 2025-07-29 PROCEDURE — 86364 TISS TRNSGLTMNASE EA IG CLAS: CPT

## 2025-07-29 PROCEDURE — 84238 ASSAY NONENDOCRINE RECEPTOR: CPT

## 2025-07-29 PROCEDURE — 36415 COLL VENOUS BLD VENIPUNCTURE: CPT

## 2025-07-29 PROCEDURE — 85025 COMPLETE CBC W/AUTO DIFF WBC: CPT

## 2025-07-30 LAB
STFR SERPL-MCNC: 4 MG/L
TTG IGA SER-ACNC: 0.3 U/ML
TTG IGG SER-ACNC: <0.6 U/ML

## 2025-08-08 ENCOUNTER — ORDERS ONLY (AUTO-RELEASED) (OUTPATIENT)
Dept: INTERNAL MEDICINE | Facility: CLINIC | Age: 75
End: 2025-08-08

## 2025-08-08 DIAGNOSIS — Z12.11 ENCOUNTER FOR SCREENING FOR MALIGNANT NEOPLASM OF COLON: ICD-10-CM

## 2025-08-11 ENCOUNTER — PATIENT OUTREACH (OUTPATIENT)
Dept: CARE COORDINATION | Facility: CLINIC | Age: 75
End: 2025-08-11
Payer: COMMERCIAL

## 2025-08-13 ENCOUNTER — THERAPY VISIT (OUTPATIENT)
Dept: PHYSICAL THERAPY | Facility: REHABILITATION | Age: 75
End: 2025-08-13
Attending: INTERNAL MEDICINE
Payer: COMMERCIAL

## 2025-08-13 DIAGNOSIS — R42 DIZZINESS: Primary | ICD-10-CM

## 2025-08-13 PROCEDURE — 97112 NEUROMUSCULAR REEDUCATION: CPT | Mod: GP

## (undated) DEVICE — SUCTION MANIFOLD NEPTUNE 2 SYS 1 PORT 702-025-000

## (undated) DEVICE — SOL WATER IRRIG 1000ML BOTTLE 2F7114

## (undated) DEVICE — DRESSING MEPILEX AG SILVER 4X8 395890

## (undated) DEVICE — SUTURE VICRYL+ 2-0 27IN CT-1 UND VCP259H

## (undated) DEVICE — BLADE SAW SAGITTAL STRK WIDE 25.4X85X1.2MM 2108-151-000

## (undated) DEVICE — SU ETHIBOND 2 V-37 4X30" MX69G

## (undated) DEVICE — STPL SKIN PROXIMATE 35 WIDE PMW35

## (undated) DEVICE — GLOVE UNDER INDICATOR PI SZ 7.0 LF 41670

## (undated) DEVICE — GUIDE PIN STERILE 3X75MM

## (undated) DEVICE — GLOVE BIOGEL PI ULTRATOUCH G SZ 6.5 42165

## (undated) DEVICE — CUSTOM PACK OPEN SHOULDER SOP5BOSHEB

## (undated) DEVICE — SU NDL MAYO 1824-4

## (undated) DEVICE — GLOVE BIOGEL PI ULTRATOUCH G SZ 7.0 42170

## (undated) DEVICE — GLOVE BIOGEL PI ULTRATOUCH G SZ 8.5 42185

## (undated) DEVICE — SUTURE VICRYL+ 0 27IN CT-1 UND VCP260H

## (undated) DEVICE — GLOVE UNDER INDICATOR PI SZ 8.5 LF 41685

## (undated) DEVICE — GUIDEWIRE TORNIER AEQUALIS PERFORM +  2.5X220MM DWD017

## (undated) DEVICE — PLATE GROUNDING ADULT W/CORD 9165L

## (undated) DEVICE — DRILL BIT PERIPHERAL SCREW 3.2MM MWJ126

## (undated) DEVICE — SU FIBERWIRE 2 38" T-8 NDL  AR-7206

## (undated) DEVICE — SOL NACL 0.9% IRRIG 1000ML BOTTLE 2F7124

## (undated) DEVICE — HOLDER LIMB VELCRO OR 0814-1533

## (undated) RX ORDER — PROPOFOL 10 MG/ML
INJECTION, EMULSION INTRAVENOUS
Status: DISPENSED
Start: 2023-01-11

## (undated) RX ORDER — DEXAMETHASONE SODIUM PHOSPHATE 10 MG/ML
INJECTION, EMULSION INTRAMUSCULAR; INTRAVENOUS
Status: DISPENSED
Start: 2023-01-11

## (undated) RX ORDER — FENTANYL CITRATE-0.9 % NACL/PF 10 MCG/ML
PLASTIC BAG, INJECTION (ML) INTRAVENOUS
Status: DISPENSED
Start: 2023-01-11

## (undated) RX ORDER — ONDANSETRON 2 MG/ML
INJECTION INTRAMUSCULAR; INTRAVENOUS
Status: DISPENSED
Start: 2023-01-11

## (undated) RX ORDER — FENTANYL CITRATE 50 UG/ML
INJECTION, SOLUTION INTRAMUSCULAR; INTRAVENOUS
Status: DISPENSED
Start: 2023-01-11